# Patient Record
Sex: FEMALE | Race: WHITE | NOT HISPANIC OR LATINO | URBAN - METROPOLITAN AREA
[De-identification: names, ages, dates, MRNs, and addresses within clinical notes are randomized per-mention and may not be internally consistent; named-entity substitution may affect disease eponyms.]

---

## 2018-03-07 ENCOUNTER — INPATIENT (INPATIENT)
Facility: HOSPITAL | Age: 83
LOS: 0 days | Discharge: HOSPICE | End: 2018-03-08
Attending: HOSPITALIST | Admitting: HOSPITALIST
Payer: MEDICARE

## 2018-03-07 VITALS
SYSTOLIC BLOOD PRESSURE: 136 MMHG | HEART RATE: 78 BPM | DIASTOLIC BLOOD PRESSURE: 63 MMHG | TEMPERATURE: 98 F | RESPIRATION RATE: 17 BRPM | OXYGEN SATURATION: 100 %

## 2018-03-07 DIAGNOSIS — Z29.9 ENCOUNTER FOR PROPHYLACTIC MEASURES, UNSPECIFIED: ICD-10-CM

## 2018-03-07 DIAGNOSIS — Z98.41 CATARACT EXTRACTION STATUS, RIGHT EYE: Chronic | ICD-10-CM

## 2018-03-07 DIAGNOSIS — I25.10 ATHEROSCLEROTIC HEART DISEASE OF NATIVE CORONARY ARTERY WITHOUT ANGINA PECTORIS: ICD-10-CM

## 2018-03-07 DIAGNOSIS — Z98.89 OTHER SPECIFIED POSTPROCEDURAL STATES: Chronic | ICD-10-CM

## 2018-03-07 DIAGNOSIS — I10 ESSENTIAL (PRIMARY) HYPERTENSION: ICD-10-CM

## 2018-03-07 DIAGNOSIS — I24.9 ACUTE ISCHEMIC HEART DISEASE, UNSPECIFIED: ICD-10-CM

## 2018-03-07 DIAGNOSIS — S72.90XA UNSPECIFIED FRACTURE OF UNSPECIFIED FEMUR, INITIAL ENCOUNTER FOR CLOSED FRACTURE: Chronic | ICD-10-CM

## 2018-03-07 DIAGNOSIS — R07.9 CHEST PAIN, UNSPECIFIED: ICD-10-CM

## 2018-03-07 LAB
ALBUMIN SERPL ELPH-MCNC: 4.2 G/DL — SIGNIFICANT CHANGE UP (ref 3.3–5)
ALP SERPL-CCNC: 61 U/L — SIGNIFICANT CHANGE UP (ref 40–120)
ALT FLD-CCNC: 8 U/L — SIGNIFICANT CHANGE UP (ref 4–33)
APPEARANCE UR: CLEAR — SIGNIFICANT CHANGE UP
APTT BLD: 30.5 SEC — SIGNIFICANT CHANGE UP (ref 27.5–37.4)
AST SERPL-CCNC: 14 U/L — SIGNIFICANT CHANGE UP (ref 4–32)
BASE EXCESS BLDV CALC-SCNC: 1.5 MMOL/L — SIGNIFICANT CHANGE UP
BASOPHILS # BLD AUTO: 0.03 K/UL — SIGNIFICANT CHANGE UP (ref 0–0.2)
BASOPHILS NFR BLD AUTO: 0.2 % — SIGNIFICANT CHANGE UP (ref 0–2)
BILIRUB SERPL-MCNC: 0.5 MG/DL — SIGNIFICANT CHANGE UP (ref 0.2–1.2)
BILIRUB UR-MCNC: NEGATIVE — SIGNIFICANT CHANGE UP
BLOOD GAS VENOUS - CREATININE: 0.55 MG/DL — SIGNIFICANT CHANGE UP (ref 0.5–1.3)
BLOOD UR QL VISUAL: NEGATIVE — SIGNIFICANT CHANGE UP
BUN SERPL-MCNC: 17 MG/DL — SIGNIFICANT CHANGE UP (ref 7–23)
CALCIUM SERPL-MCNC: 8.7 MG/DL — SIGNIFICANT CHANGE UP (ref 8.4–10.5)
CHLORIDE BLDV-SCNC: 107 MMOL/L — SIGNIFICANT CHANGE UP (ref 96–108)
CHLORIDE SERPL-SCNC: 104 MMOL/L — SIGNIFICANT CHANGE UP (ref 98–107)
CHOLEST SERPL-MCNC: 198 MG/DL — SIGNIFICANT CHANGE UP (ref 120–199)
CK MB BLD-MCNC: 1.05 NG/ML — SIGNIFICANT CHANGE UP (ref 1–4.7)
CK MB BLD-MCNC: 1.81 NG/ML — SIGNIFICANT CHANGE UP (ref 1–4.7)
CK SERPL-CCNC: 14 U/L — LOW (ref 25–170)
CK SERPL-CCNC: 17 U/L — LOW (ref 25–170)
CO2 SERPL-SCNC: 23 MMOL/L — SIGNIFICANT CHANGE UP (ref 22–31)
COLOR SPEC: SIGNIFICANT CHANGE UP
CREAT SERPL-MCNC: 0.59 MG/DL — SIGNIFICANT CHANGE UP (ref 0.5–1.3)
D DIMER BLD IA.RAPID-MCNC: 174 NG/ML — SIGNIFICANT CHANGE UP
EOSINOPHIL # BLD AUTO: 0 K/UL — SIGNIFICANT CHANGE UP (ref 0–0.5)
EOSINOPHIL NFR BLD AUTO: 0 % — SIGNIFICANT CHANGE UP (ref 0–6)
GAS PNL BLDV: 138 MMOL/L — SIGNIFICANT CHANGE UP (ref 136–146)
GLUCOSE BLDV-MCNC: 161 — HIGH (ref 70–99)
GLUCOSE SERPL-MCNC: 154 MG/DL — HIGH (ref 70–99)
GLUCOSE UR-MCNC: NEGATIVE — SIGNIFICANT CHANGE UP
HBA1C BLD-MCNC: 5.8 % — HIGH (ref 4–5.6)
HCO3 BLDV-SCNC: 24 MMOL/L — SIGNIFICANT CHANGE UP (ref 20–27)
HCT VFR BLD CALC: 39.9 % — SIGNIFICANT CHANGE UP (ref 34.5–45)
HCT VFR BLDV CALC: 39.2 % — SIGNIFICANT CHANGE UP (ref 34.5–45)
HDLC SERPL-MCNC: 52 MG/DL — SIGNIFICANT CHANGE UP (ref 45–65)
HGB BLD-MCNC: 12.3 G/DL — SIGNIFICANT CHANGE UP (ref 11.5–15.5)
HGB BLDV-MCNC: 12.7 G/DL — SIGNIFICANT CHANGE UP (ref 11.5–15.5)
IMM GRANULOCYTES # BLD AUTO: 0.04 # — SIGNIFICANT CHANGE UP
IMM GRANULOCYTES NFR BLD AUTO: 0.2 % — SIGNIFICANT CHANGE UP (ref 0–1.5)
INR BLD: 1.04 — SIGNIFICANT CHANGE UP (ref 0.88–1.17)
KETONES UR-MCNC: NEGATIVE — SIGNIFICANT CHANGE UP
LACTATE BLDV-MCNC: 3.1 MMOL/L — HIGH (ref 0.5–2)
LEUKOCYTE ESTERASE UR-ACNC: NEGATIVE — SIGNIFICANT CHANGE UP
LIPID PNL WITH DIRECT LDL SERPL: 153 MG/DL — SIGNIFICANT CHANGE UP
LYMPHOCYTES # BLD AUTO: 11.52 K/UL — HIGH (ref 1–3.3)
LYMPHOCYTES # BLD AUTO: 66.9 % — HIGH (ref 13–44)
MCHC RBC-ENTMCNC: 28.7 PG — SIGNIFICANT CHANGE UP (ref 27–34)
MCHC RBC-ENTMCNC: 30.8 % — LOW (ref 32–36)
MCV RBC AUTO: 93.2 FL — SIGNIFICANT CHANGE UP (ref 80–100)
MONOCYTES # BLD AUTO: 0.3 K/UL — SIGNIFICANT CHANGE UP (ref 0–0.9)
MONOCYTES NFR BLD AUTO: 1.7 % — LOW (ref 2–14)
MUCOUS THREADS # UR AUTO: SIGNIFICANT CHANGE UP
NEUTROPHILS # BLD AUTO: 5.33 K/UL — SIGNIFICANT CHANGE UP (ref 1.8–7.4)
NEUTROPHILS NFR BLD AUTO: 31 % — LOW (ref 43–77)
NITRITE UR-MCNC: NEGATIVE — SIGNIFICANT CHANGE UP
NON-SQ EPI CELLS # UR AUTO: <1 — SIGNIFICANT CHANGE UP
NRBC # FLD: 0 — SIGNIFICANT CHANGE UP
NT-PROBNP SERPL-SCNC: 1050 PG/ML — SIGNIFICANT CHANGE UP
PCO2 BLDV: 44 MMHG — SIGNIFICANT CHANGE UP (ref 41–51)
PH BLDV: 7.39 PH — SIGNIFICANT CHANGE UP (ref 7.32–7.43)
PH UR: 6 — SIGNIFICANT CHANGE UP (ref 4.6–8)
PLATELET # BLD AUTO: 234 K/UL — SIGNIFICANT CHANGE UP (ref 150–400)
PMV BLD: 10.5 FL — SIGNIFICANT CHANGE UP (ref 7–13)
PO2 BLDV: < 24 MMHG — LOW (ref 35–40)
POTASSIUM BLDV-SCNC: 4.8 MMOL/L — HIGH (ref 3.4–4.5)
POTASSIUM SERPL-MCNC: 5.2 MMOL/L — SIGNIFICANT CHANGE UP (ref 3.5–5.3)
POTASSIUM SERPL-SCNC: 5.2 MMOL/L — SIGNIFICANT CHANGE UP (ref 3.5–5.3)
PROT SERPL-MCNC: 6.5 G/DL — SIGNIFICANT CHANGE UP (ref 6–8.3)
PROT UR-MCNC: NEGATIVE MG/DL — SIGNIFICANT CHANGE UP
PROTHROM AB SERPL-ACNC: 11.6 SEC — SIGNIFICANT CHANGE UP (ref 9.8–13.1)
RBC # BLD: 4.28 M/UL — SIGNIFICANT CHANGE UP (ref 3.8–5.2)
RBC # FLD: 13.4 % — SIGNIFICANT CHANGE UP (ref 10.3–14.5)
RBC CASTS # UR COMP ASSIST: SIGNIFICANT CHANGE UP (ref 0–?)
SAO2 % BLDV: 22.9 % — LOW (ref 60–85)
SODIUM SERPL-SCNC: 141 MMOL/L — SIGNIFICANT CHANGE UP (ref 135–145)
SP GR SPEC: 1.01 — SIGNIFICANT CHANGE UP (ref 1–1.04)
SQUAMOUS # UR AUTO: SIGNIFICANT CHANGE UP
TRIGL SERPL-MCNC: 74 MG/DL — SIGNIFICANT CHANGE UP (ref 10–149)
TROPONIN T SERPL-MCNC: < 0.06 NG/ML — SIGNIFICANT CHANGE UP (ref 0–0.06)
TROPONIN T SERPL-MCNC: < 0.06 NG/ML — SIGNIFICANT CHANGE UP (ref 0–0.06)
TSH SERPL-MCNC: 1.9 UIU/ML — SIGNIFICANT CHANGE UP (ref 0.27–4.2)
UROBILINOGEN FLD QL: NORMAL MG/DL — SIGNIFICANT CHANGE UP
WBC # BLD: 17.22 K/UL — HIGH (ref 3.8–10.5)
WBC # FLD AUTO: 17.22 K/UL — HIGH (ref 3.8–10.5)
WBC UR QL: SIGNIFICANT CHANGE UP (ref 0–?)

## 2018-03-07 PROCEDURE — 99223 1ST HOSP IP/OBS HIGH 75: CPT | Mod: GV

## 2018-03-07 PROCEDURE — 71045 X-RAY EXAM CHEST 1 VIEW: CPT | Mod: 26

## 2018-03-07 PROCEDURE — 93970 EXTREMITY STUDY: CPT | Mod: 26

## 2018-03-07 RX ORDER — PREGABALIN 225 MG/1
1000 CAPSULE ORAL ONCE
Qty: 0 | Refills: 0 | Status: COMPLETED | OUTPATIENT
Start: 2018-03-07 | End: 2018-03-07

## 2018-03-07 RX ORDER — CLOPIDOGREL BISULFATE 75 MG/1
75 TABLET, FILM COATED ORAL DAILY
Qty: 0 | Refills: 0 | Status: DISCONTINUED | OUTPATIENT
Start: 2018-03-07 | End: 2018-03-08

## 2018-03-07 RX ORDER — ACETAMINOPHEN 500 MG
1000 TABLET ORAL ONCE
Qty: 0 | Refills: 0 | Status: COMPLETED | OUTPATIENT
Start: 2018-03-07 | End: 2018-03-07

## 2018-03-07 RX ORDER — SIMVASTATIN 20 MG/1
20 TABLET, FILM COATED ORAL AT BEDTIME
Qty: 0 | Refills: 0 | Status: DISCONTINUED | OUTPATIENT
Start: 2018-03-07 | End: 2018-03-08

## 2018-03-07 RX ORDER — METOPROLOL TARTRATE 50 MG
100 TABLET ORAL DAILY
Qty: 0 | Refills: 0 | Status: DISCONTINUED | OUTPATIENT
Start: 2018-03-07 | End: 2018-03-08

## 2018-03-07 RX ORDER — HEPARIN SODIUM 5000 [USP'U]/ML
5000 INJECTION INTRAVENOUS; SUBCUTANEOUS EVERY 8 HOURS
Qty: 0 | Refills: 0 | Status: DISCONTINUED | OUTPATIENT
Start: 2018-03-07 | End: 2018-03-08

## 2018-03-07 RX ORDER — PREGABALIN 225 MG/1
1 CAPSULE ORAL
Qty: 0 | Refills: 0 | COMMUNITY

## 2018-03-07 RX ORDER — ISOSORBIDE MONONITRATE 60 MG/1
60 TABLET, EXTENDED RELEASE ORAL DAILY
Qty: 0 | Refills: 0 | Status: DISCONTINUED | OUTPATIENT
Start: 2018-03-07 | End: 2018-03-08

## 2018-03-07 RX ORDER — AMLODIPINE BESYLATE 2.5 MG/1
5 TABLET ORAL DAILY
Qty: 0 | Refills: 0 | Status: DISCONTINUED | OUTPATIENT
Start: 2018-03-07 | End: 2018-03-08

## 2018-03-07 RX ADMIN — HEPARIN SODIUM 5000 UNIT(S): 5000 INJECTION INTRAVENOUS; SUBCUTANEOUS at 13:34

## 2018-03-07 RX ADMIN — HEPARIN SODIUM 5000 UNIT(S): 5000 INJECTION INTRAVENOUS; SUBCUTANEOUS at 21:46

## 2018-03-07 RX ADMIN — Medication 400 MILLIGRAM(S): at 06:29

## 2018-03-07 RX ADMIN — CLOPIDOGREL BISULFATE 75 MILLIGRAM(S): 75 TABLET, FILM COATED ORAL at 13:33

## 2018-03-07 RX ADMIN — SIMVASTATIN 20 MILLIGRAM(S): 20 TABLET, FILM COATED ORAL at 21:46

## 2018-03-07 RX ADMIN — PREGABALIN 1000 MICROGRAM(S): 225 CAPSULE ORAL at 13:33

## 2018-03-07 RX ADMIN — ISOSORBIDE MONONITRATE 60 MILLIGRAM(S): 60 TABLET, EXTENDED RELEASE ORAL at 13:33

## 2018-03-07 NOTE — ED ADULT NURSE NOTE - OBJECTIVE STATEMENT
pt received to room 4, aOx3 . pt received to room 4, aOx3 . pt brought in by EMS and received 2 SL Nitro in field. pt has a hx of CAD. pt stated that her pain is a strong pressure on left side of sternal boarder. pt states that her pain is a 7/10 but denies pain medications at this time. pt received EKG upon arrival. labs were collected and sent from right 22g in AC. pt respirations are even and unlabored now on room air. pt is blind , nephew at bedside. pt on cardiac monitor and will continue to monitor

## 2018-03-07 NOTE — CONSULT NOTE ADULT - SUBJECTIVE AND OBJECTIVE BOX
Patient seen and evaluated at bedside   Chief Complaint: Chest pain    HPI:  89 yo F p/w constant Left sided CP, described as sharp and burning, radiating down her Lt arm since yesterday evening. Pt initially didn't feel good during the day, felt nausea, then ~9pm while sitting and listening to the TV she began to feel the chest discomfort, she itzel to the bathroom, moved her bowels and while walking back to her  she felt the CP come on stronger 10/10 severity. Pt admits to a slight reproducible & pleuritic component, unsure if positional. Pt also felt accompanying dizziness & SOB. No diaphoresis or palp's. Pt told her sister about the CP who in turn called her daughter & grandson who then called EMS. Pt is unsure if this is what she felt when she had stents, doesn't remember. Pt received NTG but denies improvement of CP with this, felt CP eased up last night but still persists. Also c/o VERDUZCO going up a flight of stairs for a few years, as well as b/l pedal edema for a few years. (07 Mar 2018 10:40)    Currently patient is without chest pain.    PMH:   Legally blind  Complete heart block  Hyperlipemia  Hypertension  Pulmonary embolism  Angina pectoris, unstable  Cancer of Uterus  Knee Problem  Osteoarthritis  Cataract  Osteoporosis  Glaucoma, Narrow-Angle  Dyslipidemia  CLL (Chronic Lymphoblastic Yosef  Bradyarrhythmia  Coronary Artery Disease  HTN    PSH:   Closed fracture of femur  H/O bilateral cataract extraction  History of total abdominal hysterectomy  History of tonsillectomy  Retina  baerveldt implant left eye  History of Carotid Endarterectomy  Unspecified Cataract  Coronary Stent  History of Arthroscopic Knee S  History of Hysterectomy  Cardiac Pacemaker  Dystrophy, Cornea  History of Tonsillectomy  Enlargement of Tonsils    Medications:   amLODIPine   Tablet 5 milliGRAM(s) Oral daily  clopidogrel Tablet 75 milliGRAM(s) Oral daily  heparin  Injectable 5000 Unit(s) SubCutaneous every 8 hours  isosorbide   mononitrate ER Tablet (IMDUR) 60 milliGRAM(s) Oral daily  metoprolol succinate  milliGRAM(s) Oral daily  simvastatin 20 milliGRAM(s) Oral at bedtime    Allergies:  morphine (Other)  oxycodone (Unknown)  Percocet 5/325 (Unknown)  pilocarpine (Other)    FAMILY HISTORY:  No pertinent family history in first degree relatives    Social History:  Smoking:  Denies    REVIEW OF SYSTEMS:  CARDIOVASCULAR: See HPI  All other review of systems is negative unless indicated above    Physical Exam:  T(C): 36.9 (18 @ 13:55), Max: 36.9 (18 @ 13:55)  HR: 69 (18 @ 13:55) (69 - 81)  BP: 138/53 (18 @ 13:55) (122/62 - 153/66)  RR: 18 (18 @ 13:55) (16 - 18)  SpO2: 97% (18 @ 13:55) (94% - 100%)  Wt(kg): --    Daily Height in cm: 147.32 (07 Mar 2018 10:40)    Daily     Appearance:  Normal, NAD  Eyes:  PERRL, EOMI  HEENT: Normal oral muscosa, NC/AT.    Neck:  No JVD.  Carotids are full  Respiratory: Clear to auscultation bilaterally  Cardiovascular: Normal S1 and S2 with 1/6 systolic murmur base and 2/6 HSM murmur LSB radiating to axila.  No rubs or gallops  Abdomen:   BS normal, Soft,  Non-tender without organomegaly or masses  Extremities: Trace BL leg edema      Labs:                        12.3   17.22 )-----------( 234      ( 07 Mar 2018 01:42 )             39.9     03-    141  |  104  |  17  ----------------------------<  154<H>  5.2   |  23  |  0.59    Ca    8.7      07 Mar 2018 01:42    TPro  6.5  /  Alb  4.2  /  TBili  0.5  /  DBili  x   /  AST  14  /  ALT  8   /  AlkPhos  61  03-07    PT/INR - ( 07 Mar 2018 01:42 )   PT: 11.6 SEC;   INR: 1.04          PTT - ( 07 Mar 2018 01:42 )  PTT:30.5 SEC  CARDIAC MARKERS ( 07 Mar 2018 10:31 )  x     / < 0.06 ng/mL / 17 u/L / 1.81 ng/mL / x      CARDIAC MARKERS ( 07 Mar 2018 01:42 )  x     / < 0.06 ng/mL / 14 u/L / 1.05 ng/mL / x          Serum Pro-Brain Natriuretic Peptide: 1050 pg/mL ( @ 01:42)    Total Cholesterol: 198  LDL: 153  HDL: 52  T    Hemoglobin A1C, Whole Blood: 5.8 % ( @ 10:31)    Thyroid Stimulating Hormone, Serum: 1.90 uIU/mL ( @ 10:31)        ECG:  AV paced rhythm LBBB, L axis.  Occasional PVCs.

## 2018-03-07 NOTE — ED ADULT NURSE NOTE - CHIEF COMPLAINT QUOTE
pt. BIBA for sudden onset of left sided chest pain radiating to left arm x 2hrs. pt. was getting ready for bed, felt pain on chest, went to the bathroom and had a BM then pain got worse. pt. also reports feeling nauseated and had SOB. pt. took 2 NTG at home and was given 1 NTG SL by EMS PTA, with minimal relief. PMHx: PPM, cardiac stentsx2, HTN, CLL, blindness

## 2018-03-07 NOTE — H&P ADULT - NSHPSOCIALHISTORY_GEN_ALL_CORE
, lives with sister, niece. Denies smoking, or drug use. Drinks a little wine or a cocktail on occasion but not lately

## 2018-03-07 NOTE — H&P ADULT - PROBLEM SELECTOR PLAN 4
IMPROVE VTE Individual Risk Assessment        RISK                                                          Points  [  ] Previous VTE                                                 3  [  ] Thrombophilia                                              2  [  ] Lower limb paralysis                                    2        (unable to hold up >15 seconds)    [  ] Current Cancer                                             2         (within 6 months)  [x ] Immobilization > 24 hrs                              1  [  ] ICU/CCU stay > 24 hours                            1  [x ] Age > 60                                                        1  IMPROVE VTE Score _________2; hsq for dvt ppx

## 2018-03-07 NOTE — H&P ADULT - PROBLEM SELECTOR PLAN 2
continue Plavix, isosorbide, metoprolol,  statin Concern for angina as stated above.     continue Plavix, isosorbide, metoprolol,  statin

## 2018-03-07 NOTE — ED PROVIDER NOTE - MEDICAL DECISION MAKING DETAILS
89 y/o F w/ PMHx of HTN, CAD w/ [id number], pace maker, CLL not on active tx and blindness, here w/ CP, nausea, SOB, and not feeling well. Plan - Will obtain labs, Cardiac enzymes, BMP, EKG, CXR, admit, pt's cardiologist is Adams Walters.

## 2018-03-07 NOTE — H&P ADULT - ASSESSMENT
89 yo F p/w CP since ~9pm yesterday evening  EKG- V-paced @ 87 89 yo F p/w CP since ~9pm yesterday evening    EKG- V-paced @ 87

## 2018-03-07 NOTE — H&P ADULT - OPH GEN HX ROS MEA POS PC
loss of vision R/blind both eyes/loss of vision L blind both eyes chronic/loss of vision R/loss of vision L

## 2018-03-07 NOTE — H&P ADULT - NEGATIVE ENMT SYMPTOMS
no vertigo/no hearing difficulty/no ear pain/no tinnitus/no sinus symptoms/no nasal discharge/no throat pain/no dysphagia/no nasal congestion

## 2018-03-07 NOTE — H&P ADULT - PROBLEM SELECTOR PLAN 1
Admit to Telemetry, serial CE's, EKG's, FLP, continue plavix, BB, statin. Check Echo. F/U MD note, Cardiology c/s Dr Stokes Admit to Telemetry, serial CE's, EKG's, FLP, Ddimer. continue plavix, BB, statin. Check Echo. F/U MD note, Cardiology c/s Dr Stokes Patient presents with severe chest pain which is different from previous episodes of chest pain. It was left-sided and occurred at rest. Likely atypical but still concerning for angina. Cardiac enzymes wnl x2, inconsistent with acute MI. Followed by Dr. Stokes as outpatient. D-dimer wnl, less likely PE.  -f/u cardiology recommendations  -monitor on cardiac telemetry   -continue plavix, BB, statin   -f/u echo

## 2018-03-07 NOTE — ED ADULT NURSE NOTE - PSH
baerveldt implant left eye    Cardiac Pacemaker  2009  Closed fracture of femur  h/o ORIF right femur fracture, 2012  Coronary Stent  2006 x 3 LAD, Circ, RCA  2012 GRETA placed  Dystrophy, Cornea  right cornea implant  H/O bilateral cataract extraction    History of Arthroscopic Knee S  right knee x 2  History of Carotid Endarterectomy  on L 2009  History of tonsillectomy  as a child  History of total abdominal hysterectomy  MAXX-BSO  Retina  retina and glaucoma surgery left eye -here 12/11, 2/12 and 3/12

## 2018-03-07 NOTE — ED PROVIDER NOTE - PROGRESS NOTE DETAILS
le lea: spoke to dr. espinoza- pt to be admitted to hospitalist and he will consult. accepted by Dr. Hernández text paged tele PA. pt stable

## 2018-03-07 NOTE — ED ADULT NURSE REASSESSMENT NOTE - NS ED NURSE REASSESS COMMENT FT1
pt transported to room 421, pt left in NAD, pt belongs returned, pt medicated as per MD order before transferred.
Pt assisted to bedpan by female PCA at pt request.  Urine samples collected and sent to lab.  PCA changed pt.

## 2018-03-07 NOTE — H&P ADULT - HISTORY OF PRESENT ILLNESS
89 yo F p/w constant Left sided CP, described as sharp and burning, radiating down her Lt arm since yesterday evening. Pt initially didn't feel good during the day, felt nausea, then ~9pm while sitting and listening to the TV she began to feel the chest discomfort, she itzel to the bathroom, moved her bowels and while walking back to her  she felt the CP come on stronger 10/10 severity. Pt admits to a slight reproducible & pleuritic component, unsure if positional. Pt also felt accompanying dizziness & SOB. No diaphoresis or palp's. Pt told her sister about the CP who in turn called her daughter & grandson who is a Pharmacist/ Medical  and then they called EMS. Pt is unsure if this is what she felt when she had stents, doesn't remember. Pt received NTG but denies improvement of CP with this, felt CP eased up last night but still persists. Also c/o VERDUZCO going up a flight of stairs for a few years, as well as b/l pedal edema for a few years.

## 2018-03-07 NOTE — ED ADULT NURSE NOTE - PMH
Cancer of Uterus    Cataract    CLL (Chronic Lymphoblastic Yosef    Complete heart block    Coronary Artery Disease    Dyslipidemia    Glaucoma, Narrow-Angle    HTN    Hyperlipemia    Legally blind    Osteoarthritis    Osteoporosis    Pulmonary embolism

## 2018-03-07 NOTE — H&P ADULT - NSHPPHYSICALEXAM_GEN_ALL_CORE
Vital Signs Last 24 Hrs  T(C): 36.2 (07 Mar 2018 07:10), Max: 36.7 (07 Mar 2018 01:35)  T(F): 97.2 (07 Mar 2018 07:10), Max: 98 (07 Mar 2018 01:35)  HR: 81 (07 Mar 2018 07:10) (72 - 81)  BP: 153/66 (07 Mar 2018 07:10) (122/62 - 153/66)  BP(mean): --  RR: 18 (07 Mar 2018 07:10) (16 - 18)  SpO2: 94% (07 Mar 2018 07:10) (94% - 100%)

## 2018-03-07 NOTE — ED PROVIDER NOTE - OBJECTIVE STATEMENT
89 yo F w/ PMHx of HTN, CAD w/ [id number], pace maker, CLL not on active tx, and blind coming in w/ intermittent lt sided sharp CP throughout the day pt states "as not feeling well today. Felt tired and nauseous. Felt like she had to use bathroom but too tired to make it too restroom. Also felt SOB. Pt took x3 Nitrostat w/ some relief. Pt's family called 911, was given an additional Nitrostat by EMS. Pt states no current pain. Denies any recent illness. No fever, no chills, no cough, no vomiting, no abdominal pain, no urinary sx. 89 yo F w/ PMHx of HTN, CAD w/ [id number], pace maker, CLL not on active tx, and blind coming in w/ intermittent lt sided sharp CP throughout the day pt states "as not feeling well today. Felt tired and nauseous. Felt like she had to use bathroom but too tired to make it too restroom. Also felt SOB. Pt took x3 Nitrostat w/ some relief. Pt's family called 911, was given an additional Nitrostat by EMS. Pt states no current pain. Denies any recent illness. No fever, no chills, no cough, no vomiting, no abdominal pain, no urinary sx.  [place]: Pt lives w/ family, ambulates w/ assistance. Cards Dr. espinoza. Last cath May 2016, pt unsure if any cardiac w/u since then. PT still having the cp but much improved from prior. During episode, she felt like she was going to die. +chronic b/l ankle swelling. Pt states she was given asa.

## 2018-03-07 NOTE — ED ADULT TRIAGE NOTE - CHIEF COMPLAINT QUOTE
pt. BIBA for sudden onset of left sided chest pain radiating to left arm. pt. was getting ready for bed, felt pain on chest, went to the bathroom and had a BM then pain got worse. pt. also reports feeling nauseated and had SOB. pt. took 2 NTG at home and was given 1 NTG SL by EMS, with minimal relief. PMHx: PPM, cardiac stentsx2, HTN, CLL, blindness pt. BIBA for sudden onset of left sided chest pain radiating to left arm x 2hrs. pt. was getting ready for bed, felt pain on chest, went to the bathroom and had a BM then pain got worse. pt. also reports feeling nauseated and had SOB. pt. took 2 NTG at home and was given 1 NTG SL by EMS PTA, with minimal relief. PMHx: PPM, cardiac stentsx2, HTN, CLL, blindness

## 2018-03-07 NOTE — CONSULT NOTE ADULT - ASSESSMENT
Impression  Possible unstable angina versus other etiology of worsening frequent chest discomfort.  PAtient has worsening chest pain syndrome 2 years ago with cath revealing only non-obstructive CAD.                      Known moderate mitral regurgitation,  LVH and mild diastolic dysfunction.                       No enzymatic evidence of acute MI.  D-dimer normal and pro-BNP normal for age.    Recommend:  Would observe on tele 1 day.                                             Echocardiogram.                         Would not pursue stress testing or angiography at this time.

## 2018-03-07 NOTE — ED PROVIDER NOTE - ATTENDING CONTRIBUTION TO CARE
89F PMH HTN, CAD w/ stents, PPM, CLL not on active tx, blind, p/w worsening of chronic intermittent CP. Associated w/ nausea, SOB, and feeling like she was going to die. Received nitro and asa. Still in pain but much improved. No neuro or infectious symptoms. Vitals wnl, exam as above. Well appearing. EKG non-ischemic.  ddx: Concern for ACS. clinically not PE, tamponade, dissection, PTX, perf, myocarditis, mediastinitis.   CBC, cmp, Ce, bnp. CXR. High risk cardiac pt w/ concerning story. Likely admission for further cardiac w/u.

## 2018-03-08 ENCOUNTER — TRANSCRIPTION ENCOUNTER (OUTPATIENT)
Age: 83
End: 2018-03-08

## 2018-03-08 VITALS — WEIGHT: 114.64 LBS

## 2018-03-08 DIAGNOSIS — R07.89 OTHER CHEST PAIN: ICD-10-CM

## 2018-03-08 DIAGNOSIS — C91.10 CHRONIC LYMPHOCYTIC LEUKEMIA OF B-CELL TYPE NOT HAVING ACHIEVED REMISSION: ICD-10-CM

## 2018-03-08 LAB
BUN SERPL-MCNC: 18 MG/DL — SIGNIFICANT CHANGE UP (ref 7–23)
CALCIUM SERPL-MCNC: 8.4 MG/DL — SIGNIFICANT CHANGE UP (ref 8.4–10.5)
CHLORIDE SERPL-SCNC: 102 MMOL/L — SIGNIFICANT CHANGE UP (ref 98–107)
CO2 SERPL-SCNC: 22 MMOL/L — SIGNIFICANT CHANGE UP (ref 22–31)
CREAT SERPL-MCNC: 0.6 MG/DL — SIGNIFICANT CHANGE UP (ref 0.5–1.3)
GLUCOSE SERPL-MCNC: 73 MG/DL — SIGNIFICANT CHANGE UP (ref 70–99)
HCT VFR BLD CALC: 39.5 % — SIGNIFICANT CHANGE UP (ref 34.5–45)
HGB BLD-MCNC: 12.9 G/DL — SIGNIFICANT CHANGE UP (ref 11.5–15.5)
MAGNESIUM SERPL-MCNC: 2 MG/DL — SIGNIFICANT CHANGE UP (ref 1.6–2.6)
MCHC RBC-ENTMCNC: 31 PG — SIGNIFICANT CHANGE UP (ref 27–34)
MCHC RBC-ENTMCNC: 32.7 % — SIGNIFICANT CHANGE UP (ref 32–36)
MCV RBC AUTO: 95 FL — SIGNIFICANT CHANGE UP (ref 80–100)
NRBC # FLD: 0 — SIGNIFICANT CHANGE UP
PLATELET # BLD AUTO: 203 K/UL — SIGNIFICANT CHANGE UP (ref 150–400)
PMV BLD: 11.1 FL — SIGNIFICANT CHANGE UP (ref 7–13)
POTASSIUM SERPL-MCNC: 4.8 MMOL/L — SIGNIFICANT CHANGE UP (ref 3.5–5.3)
POTASSIUM SERPL-SCNC: 4.8 MMOL/L — SIGNIFICANT CHANGE UP (ref 3.5–5.3)
RBC # BLD: 4.16 M/UL — SIGNIFICANT CHANGE UP (ref 3.8–5.2)
RBC # FLD: 13.4 % — SIGNIFICANT CHANGE UP (ref 10.3–14.5)
SODIUM SERPL-SCNC: 141 MMOL/L — SIGNIFICANT CHANGE UP (ref 135–145)
SPECIMEN SOURCE: SIGNIFICANT CHANGE UP
WBC # BLD: 18.62 K/UL — HIGH (ref 3.8–10.5)
WBC # FLD AUTO: 18.62 K/UL — HIGH (ref 3.8–10.5)

## 2018-03-08 PROCEDURE — 99239 HOSP IP/OBS DSCHRG MGMT >30: CPT

## 2018-03-08 RX ADMIN — ISOSORBIDE MONONITRATE 60 MILLIGRAM(S): 60 TABLET, EXTENDED RELEASE ORAL at 11:30

## 2018-03-08 RX ADMIN — CLOPIDOGREL BISULFATE 75 MILLIGRAM(S): 75 TABLET, FILM COATED ORAL at 11:30

## 2018-03-08 RX ADMIN — AMLODIPINE BESYLATE 5 MILLIGRAM(S): 2.5 TABLET ORAL at 05:02

## 2018-03-08 RX ADMIN — HEPARIN SODIUM 5000 UNIT(S): 5000 INJECTION INTRAVENOUS; SUBCUTANEOUS at 05:02

## 2018-03-08 RX ADMIN — Medication 100 MILLIGRAM(S): at 05:02

## 2018-03-08 NOTE — DISCHARGE NOTE ADULT - CARE PLAN
Principal Discharge DX:	Chest pain  Goal:	prevent further events  Assessment and plan of treatment:	follow up with your PMD in one week - call for appointment  Secondary Diagnosis:	Coronary Artery Disease

## 2018-03-08 NOTE — PROGRESS NOTE ADULT - PROBLEM SELECTOR PLAN 4
Has chronic leukocytosis from this, no signs or symptoms of infection  follows with outpt Heme/Onc once a month  Currently not receiving treatment

## 2018-03-08 NOTE — DISCHARGE NOTE ADULT - PATIENT PORTAL LINK FT
You can access [facility], offered by [facility], by registering with the following website: [email or web address]

## 2018-03-08 NOTE — DISCHARGE NOTE ADULT - HOSPITAL COURSE
89 y/o female with a PMHx of CAD S/P stent placement, CHB S/P PPM placement, carotid stenosis S/P CEA, HTN, HLD, CLL, legally blind, presents to ED with chest pain and shortness of breath.     + Chest pain- cards following (Dr. Stokes)  EKG: V paced at 87 bpm  CE x2: Negative  CXR: Clear lungs. No pleural effusions or pneumothorax. Stable left chest wall dual-lead pacemaker and cardiac and mediastinal silhouettes including aortic and mitral annular calcifications. Trachea midline. Generalized osteopenia spinal degenerative changes with broad dextrocurvature, and bilateral shoulder degenerative disease again noted.  WBC: 17.22  Glucose: 154  ProBNP: 1050  UA: Negative  D-dimer: 174    3/7 LE dopplers: No evidence of deep venous thrombosis in the visualized bilateral lower   extremities.    As per Cardiology - no further work up needed    Discussed with MD - pt stable for discharge home with home hospice,  discharge medications reviewed with MD. 89 y/o female with a PMHx of CAD S/P stent placement, CHB S/P PPM placement, carotid stenosis S/P CEA, HTN, HLD, CLL, legally blind, presents to ED with chest pain and shortness of breath. Chest pain self resolved.    + Chest pain- cards following (Dr. Stokes)  EKG: V paced at 87 bpm  CE x2: Negative  CXR: Clear lungs. No pleural effusions or pneumothorax. Stable left chest wall dual-lead pacemaker and cardiac and mediastinal silhouettes including aortic and mitral annular calcifications. Trachea midline. Generalized osteopenia spinal degenerative changes with broad dextrocurvature, and bilateral shoulder degenerative disease again noted.  WBC: 17.22  Glucose: 154  ProBNP: 1050  UA: Negative  D-dimer: 174    3/7 LE dopplers: No evidence of deep venous thrombosis in the visualized bilateral lower   extremities.    As per Cardiology - no further work up needed    Discussed with MD - pt stable for discharge home with home hospice,  discharge medications reviewed with MD.

## 2018-03-08 NOTE — DISCHARGE NOTE ADULT - CARE PROVIDER_API CALL
Adams Stokes), Cardiovascular Disease; Internal Medicine  [address] [place] [address]  Phone: [phone]  Fax: [phone]

## 2018-03-08 NOTE — PROGRESS NOTE ADULT - PROBLEM SELECTOR PLAN 1
self resolved  trops negative x2, no events on tele  No further inpatient workup needed per cardiology  continue home meds for nonobstructive CAD

## 2018-03-08 NOTE — DISCHARGE NOTE ADULT - FUNCTIONAL SCREEN CURRENT LEVEL: DRESSING, MLM
"              After Visit Summary   4/15/2017    Ida Gayle    MRN: [id number]           Patient Information     Date Of Birth          1972        Visit Information        Provider Department      4/15/2017 10:45 AM Raffaele Cisneros MD [facility]        Today's Diagnoses     Exposure to strep throat    -  1    MS (multiple sclerosis) (H)- Dr Bolanos           Follow-ups after your visit        Who to contact     If you have questions or need follow up information about today's clinic visit or your schedule please contact [facility] directly at [phone].  Normal or non-critical lab and imaging results will be communicated to you by Viewdlehart, letter or phone within 4 business days after the clinic has received the results. If you do not hear from us within 7 days, please contact the clinic through Viewdlehart or phone. If you have a critical or abnormal lab result, we will notify you by phone as soon as possible.  Submit refill requests through Stratopy or call your pharmacy and they will forward the refill request to us. Please allow 3 business days for your refill to be completed.          Additional Information About Your Visit        MyChart Information     Stratopy lets you send messages to your doctor, view your test results, renew your prescriptions, schedule appointments and more. To sign up, go to [email or web address] . Click on \"Log in\" on the left side of the screen, which will take you to the Welcome page. Then click on \"Sign up Now\" on the right side of the page.     You will be asked to enter the access code listed below, as well as some personal information. Please follow the directions to create your username and password.     Your access code is: [email or web address]  Expires: 2017 11:15 AM     Your access code will  in 90 days. If you need help or a new code, please call your [place] clinic or [phone].        Care " EveryWhere ID     This is your Care EveryWhere ID. This could be used by other organizations to access your [place] medical records  [id number]        Your Vitals Were     Pulse Temperature Respirations Last Period Pulse Oximetry       76 98.2  F (36.8  C) (Oral) 16 04/15/2017 99%        Blood Pressure from Last 3 Encounters:   04/15/17 120/80   06/22/16 110/70   06/07/16 110/70    Weight from Last 3 Encounters:   06/22/16 124.3 kg (274 lb)   06/07/16 116.6 kg (257 lb)   08/21/14 105.2 kg (232 lb)              We Performed the Following     RAPID STREP (BFP)        Primary Care Provider Office Phone # Fax #    Natasha Cervantes [phone]       [facility] [address] [facility]  [address]  [facility] [address]        Thank you!     Thank you for choosing [facility]  for your care. Our goal is always to provide you with excellent care. Hearing back from our patients is one way we can continue to improve our services. Please take a few minutes to complete the written survey that you may receive in the mail after your visit with us. Thank you!             Your Updated Medication List - Protect others around you: Learn how to safely use, store and throw away your medicines at www.disposemymeds.org.          This list is accurate as of: 4/15/17 11:15 AM.  Always use your most recent med list.                   Brand Name Dispense Instructions for use    citalopram 20 MG tablet    celeXA         hydrocortisone 0.2 % cream    WESTCORT    45 g    Apply sparingly to affected area at bedtime       REBIF SC             (0) independent

## 2018-03-08 NOTE — PROGRESS NOTE ADULT - SUBJECTIVE AND OBJECTIVE BOX
No further c/o chest pain.  Walked to bathroom without dyspnea.        REVIEW OF SYSTEMS:  CARDIOVASCULAR: No chest pain, dyspnea or palpitations  All other review of systems is negative unless indicated above    Medications:  amLODIPine   Tablet 5 milliGRAM(s) Oral daily  clopidogrel Tablet 75 milliGRAM(s) Oral daily  heparin  Injectable 5000 Unit(s) SubCutaneous every 8 hours  isosorbide   mononitrate ER Tablet (IMDUR) 60 milliGRAM(s) Oral daily  metoprolol succinate  milliGRAM(s) Oral daily  simvastatin 20 milliGRAM(s) Oral at bedtime      Physical Exam:  Vitals:  T(C): 36.5 (18 @ 05:01), Max: 36.9 (18 @ 13:55)  HR: 73 (18 @ 05:01) (69 - 75)  BP: 134/52 (18 @ 05:01) (117/46 - 138/53)  BP(mean): --  RR: 18 (18 @ 05:01) (18 - 18)  SpO2: 97% (18 @ 05:01) (95% - 97%)  Wt(kg): --  Daily Height in cm: 147.32 (07 Mar 2018 10:40)    Daily Weight in k (08 Mar 2018 07:19)  I&O's Summary      Appearance:  Normal, NAD  Eyes:  PERRL, EOMI  HEENT: Normal oral mus[place]a, NC/AT.    Neck:  No JVD.  Carotids are full  Respiratory: Clear to auscultation bilaterally  Cardiovascular: Normal S1 and S2 with 1/6 systolic murmur base and 2/6 HSM murmur LSB radiating to axilla  No rubs or gallops  Abdomen:   BS normal, Soft,  Non-tender without organomegaly or masses  Extremities: Trace BL leg edema       labs pending                              12.3   17.22 )-----------( 234      ( 07 Mar 2018 01:42 )             39.9       141  |  104  |  17  ----------------------------<  154<H>  5.2   |  23  |  0.59    Ca    8.7      07 Mar 2018 01:42    TPro  6.5  /  Alb  4.2  /  TBili  0.5  /  DBili  x   /  AST  14  /  ALT  8   /  AlkPhos  61  03-    PT/INR - ( 07 Mar 2018 01:42 )   PT: 11.6 SEC;   INR: 1.04          PTT - ( 07 Mar 2018 01:42 )  PTT:30.5 SEC  CARDIAC MARKERS ( 07 Mar 2018 10:31 )  x     / < 0.06 ng/mL / 17 u/L / 1.81 ng/mL / x      CARDIAC MARKERS ( 07 Mar 2018 01:42 )  x     / < 0.06 ng/mL / 14 u/L / 1.05 ng/mL / x          Serum Pro-Brain Natriuretic Peptide: 1050 pg/mL ( @ 01:42)    Total Cholesterol: 198  LDL: 153  HDL: 52  T    Hemoglobin A1C, Whole Blood: 5.8 % ( @ 10:31)        Interpretation of Telemetry:  Pace 60-80.  Single PVCs
Patient is a 89y old  Female who presents with a chief complaint of chest pain (08 Mar 2018 10:20)      SUBJECTIVE / OVERNIGHT EVENTS: No acute events overnight. No events on tele. No further chest pain or SOB. No N/V/D.    MEDICATIONS  (STANDING):  amLODIPine   Tablet 5 milliGRAM(s) Oral daily  clopidogrel Tablet 75 milliGRAM(s) Oral daily  heparin  Injectable 5000 Unit(s) SubCutaneous every 8 hours  isosorbide   mononitrate ER Tablet (IMDUR) 60 milliGRAM(s) Oral daily  metoprolol succinate  milliGRAM(s) Oral daily  simvastatin 20 milliGRAM(s) Oral at bedtime    MEDICATIONS  (PRN):      T(C): 36.5 (18 @ 05:01), Max: 36.9 (18 @ 13:55)  HR: 73 (18 @ 05:01) (69 - 75)  BP: 134/52 (18 @ 05:01) (117/46 - 138/53)  RR: 18 (18 @ 05:01) (18 - 18)  SpO2: 97% (18 @ 05:01) (95% - 97%)  CAPILLARY BLOOD GLUCOSE        I&O's Summary      PHYSICAL EXAM:  GENERAL: no apparent distress, on room air  EYES: closed, blind  CHEST/LUNG: Clear to auscultation bilaterally; No wheezing or crackles  HEART: s1/s2, holosystolic murmur  ABDOMEN: Soft, Nontender, Nondistended; Bowel sounds present  EXTREMITIES:  No clubbing, cyanosis, or edema  NEUROLOGY: awake, alert, responds to Qs appropriately, no gross focal deficits    LABS:                        12.9   18.62 )-----------( 203      ( 08 Mar 2018 05:29 )             39.5     -08    141  |  102  |  18  ----------------------------<  73  4.8   |  22  |  0.60    Ca    8.4      08 Mar 2018 05:41  Mg     2.0     -    TPro  6.5  /  Alb  4.2  /  TBili  0.5  /  DBili  x   /  AST  14  /  ALT  8   /  AlkPhos  61  -    PT/INR - ( 07 Mar 2018 01:42 )   PT: 11.6 SEC;   INR: 1.04          PTT - ( 07 Mar 2018 01:42 )  PTT:30.5 SEC  CARDIAC MARKERS ( 07 Mar 2018 10:31 )  x     / < 0.06 ng/mL / 17 u/L / 1.81 ng/mL / x      CARDIAC MARKERS ( 07 Mar 2018 01:42 )  x     / < 0.06 ng/mL / 14 u/L / 1.05 ng/mL / x          Urinalysis Basic - ( 07 Mar 2018 04:10 )    Color: PLYEL / Appearance: CLEAR / S.011 / pH: 6.0  Gluc: NEGATIVE / Ketone: NEGATIVE  / Bili: NEGATIVE / Urobili: NORMAL mg/dL   Blood: NEGATIVE / Protein: NEGATIVE mg/dL / Nitrite: NEGATIVE   Leuk Esterase: NEGATIVE / RBC: 0-2 / WBC 0-2   Sq Epi: OCC / Non Sq Epi: x / Bacteria: x        RADIOLOGY & ADDITIONAL TESTS:

## 2018-03-08 NOTE — DISCHARGE NOTE ADULT - COMMUNITY RESOURCES
Referred back to [facility] [phone]. Hospice will be in contact with family to evaluate and visit patient at home.

## 2018-03-08 NOTE — PROGRESS NOTE ADULT - ASSESSMENT
Impression:  No evidence of infarct.  Currently chest pain free.    Recommedn:  No objection to D/C home today.  If echo is not done prior to transportation home, would send home without having it performed.
89 yo F PMH CLL p/w CP

## 2018-03-08 NOTE — DISCHARGE NOTE ADULT - MEDICATION SUMMARY - MEDICATIONS TO TAKE
I will START or STAY ON the medications listed below when I get home from the hospital:    isosorbide mononitrate 60 mg oral tablet, extended release  -- 1 tab(s) by mouth once a day  -- Indication: For CAD    Zocor 20 mg oral tablet  -- 1 tab(s) by mouth once a day (at bedtime)  -- Indication: For Hyperlipidemia    Plavix 75 mg oral tablet  -- 1 tab(s) by mouth once a day  -- Indication: For CAD    metoprolol succinate 100 mg oral tablet, extended release  -- 1 tab(s) by mouth once a day  -- Indication: For CAD    amLODIPine 5 mg oral tablet  -- 1 tab(s) by mouth once a day  -- Indication: For HTN    Vitamin B-12 1000 mcg sublingual tablet  -- 1 tab(s) under tongue once a day  -- Indication: For supplement

## 2018-03-09 LAB
-  AMIKACIN: SIGNIFICANT CHANGE UP
-  AMPICILLIN/SULBACTAM: SIGNIFICANT CHANGE UP
-  AMPICILLIN: SIGNIFICANT CHANGE UP
-  AZTREONAM: SIGNIFICANT CHANGE UP
-  CEFAZOLIN: SIGNIFICANT CHANGE UP
-  CEFEPIME: SIGNIFICANT CHANGE UP
-  CEFOXITIN: SIGNIFICANT CHANGE UP
-  CEFTAZIDIME: SIGNIFICANT CHANGE UP
-  CEFTRIAXONE: SIGNIFICANT CHANGE UP
-  CIPROFLOXACIN: SIGNIFICANT CHANGE UP
-  ERTAPENEM: SIGNIFICANT CHANGE UP
-  GENTAMICIN: SIGNIFICANT CHANGE UP
-  IMIPENEM: SIGNIFICANT CHANGE UP
-  LEVOFLOXACIN: SIGNIFICANT CHANGE UP
-  MEROPENEM: SIGNIFICANT CHANGE UP
-  NITROFURANTOIN: SIGNIFICANT CHANGE UP
-  PIPERACILLIN/TAZOBACTAM: SIGNIFICANT CHANGE UP
-  TIGECYCLINE: SIGNIFICANT CHANGE UP
-  TOBRAMYCIN: SIGNIFICANT CHANGE UP
-  TRIMETHOPRIM/SULFAMETHOXAZOLE: SIGNIFICANT CHANGE UP
BACTERIA UR CULT: SIGNIFICANT CHANGE UP
METHOD TYPE: SIGNIFICANT CHANGE UP
ORGANISM # SPEC MICROSCOPIC CNT: SIGNIFICANT CHANGE UP

## 2021-02-12 ENCOUNTER — INPATIENT (INPATIENT)
Facility: HOSPITAL | Age: 86
LOS: 11 days | Discharge: NURSING, REHAB OR LONG-TERM CARE | End: 2021-02-24
Attending: ORTHOPAEDIC SURGERY | Admitting: ORTHOPAEDIC SURGERY
Payer: MEDICARE

## 2021-02-12 ENCOUNTER — TRANSCRIPTION ENCOUNTER (OUTPATIENT)
Age: 86
End: 2021-02-12

## 2021-02-12 VITALS
OXYGEN SATURATION: 100 % | HEART RATE: 63 BPM | DIASTOLIC BLOOD PRESSURE: 76 MMHG | SYSTOLIC BLOOD PRESSURE: 172 MMHG | RESPIRATION RATE: 16 BRPM | HEIGHT: 58 IN

## 2021-02-12 DIAGNOSIS — Z29.9 ENCOUNTER FOR PROPHYLACTIC MEASURES, UNSPECIFIED: ICD-10-CM

## 2021-02-12 DIAGNOSIS — I10 ESSENTIAL (PRIMARY) HYPERTENSION: ICD-10-CM

## 2021-02-12 DIAGNOSIS — H54.8 LEGAL BLINDNESS, AS DEFINED IN USA: ICD-10-CM

## 2021-02-12 DIAGNOSIS — Z98.41 CATARACT EXTRACTION STATUS, RIGHT EYE: Chronic | ICD-10-CM

## 2021-02-12 DIAGNOSIS — Z98.89 OTHER SPECIFIED POSTPROCEDURAL STATES: Chronic | ICD-10-CM

## 2021-02-12 DIAGNOSIS — S72.145A NONDISPLACED INTERTROCHANTERIC FRACTURE OF LEFT FEMUR, INITIAL ENCOUNTER FOR CLOSED FRACTURE: ICD-10-CM

## 2021-02-12 DIAGNOSIS — I44.2 ATRIOVENTRICULAR BLOCK, COMPLETE: ICD-10-CM

## 2021-02-12 DIAGNOSIS — C91.10 CHRONIC LYMPHOCYTIC LEUKEMIA OF B-CELL TYPE NOT HAVING ACHIEVED REMISSION: ICD-10-CM

## 2021-02-12 DIAGNOSIS — I25.10 ATHEROSCLEROTIC HEART DISEASE OF NATIVE CORONARY ARTERY WITHOUT ANGINA PECTORIS: ICD-10-CM

## 2021-02-12 DIAGNOSIS — S72.90XA UNSPECIFIED FRACTURE OF UNSPECIFIED FEMUR, INITIAL ENCOUNTER FOR CLOSED FRACTURE: Chronic | ICD-10-CM

## 2021-02-12 DIAGNOSIS — R93.89 ABNORMAL FINDINGS ON DIAGNOSTIC IMAGING OF OTHER SPECIFIED BODY STRUCTURES: ICD-10-CM

## 2021-02-12 DIAGNOSIS — E78.5 HYPERLIPIDEMIA, UNSPECIFIED: ICD-10-CM

## 2021-02-12 DIAGNOSIS — Z86.711 PERSONAL HISTORY OF PULMONARY EMBOLISM: ICD-10-CM

## 2021-02-12 DIAGNOSIS — S72.143A DISPLACED INTERTROCHANTERIC FRACTURE OF UNSPECIFIED FEMUR, INITIAL ENCOUNTER FOR CLOSED FRACTURE: ICD-10-CM

## 2021-02-12 LAB
ALBUMIN SERPL ELPH-MCNC: 3.6 G/DL — SIGNIFICANT CHANGE UP (ref 3.3–5)
ALP SERPL-CCNC: 53 U/L — SIGNIFICANT CHANGE UP (ref 40–120)
ALT FLD-CCNC: 10 U/L — SIGNIFICANT CHANGE UP (ref 4–33)
ANION GAP SERPL CALC-SCNC: 10 MMOL/L — SIGNIFICANT CHANGE UP (ref 7–14)
APTT BLD: 29.5 SEC — SIGNIFICANT CHANGE UP (ref 27–36.3)
AST SERPL-CCNC: 16 U/L — SIGNIFICANT CHANGE UP (ref 4–32)
BASOPHILS # BLD AUTO: 0.07 K/UL — SIGNIFICANT CHANGE UP (ref 0–0.2)
BASOPHILS NFR BLD AUTO: 0.3 % — SIGNIFICANT CHANGE UP (ref 0–2)
BILIRUB SERPL-MCNC: 0.8 MG/DL — SIGNIFICANT CHANGE UP (ref 0.2–1.2)
BLD GP AB SCN SERPL QL: NEGATIVE — SIGNIFICANT CHANGE UP
BLOOD GAS VENOUS COMPREHENSIVE RESULT: SIGNIFICANT CHANGE UP
BUN SERPL-MCNC: 16 MG/DL — SIGNIFICANT CHANGE UP (ref 7–23)
CALCIUM SERPL-MCNC: 8.3 MG/DL — LOW (ref 8.4–10.5)
CHLORIDE SERPL-SCNC: 96 MMOL/L — LOW (ref 98–107)
CO2 SERPL-SCNC: 25 MMOL/L — SIGNIFICANT CHANGE UP (ref 22–31)
CREAT SERPL-MCNC: 0.54 MG/DL — SIGNIFICANT CHANGE UP (ref 0.5–1.3)
EOSINOPHIL # BLD AUTO: 0.1 K/UL — SIGNIFICANT CHANGE UP (ref 0–0.5)
EOSINOPHIL NFR BLD AUTO: 0.5 % — SIGNIFICANT CHANGE UP (ref 0–6)
GLUCOSE SERPL-MCNC: 128 MG/DL — HIGH (ref 70–99)
HCT VFR BLD CALC: 36.1 % — SIGNIFICANT CHANGE UP (ref 34.5–45)
HGB BLD-MCNC: 11.5 G/DL — SIGNIFICANT CHANGE UP (ref 11.5–15.5)
IANC: 8.98 K/UL — HIGH (ref 1.5–8.5)
IMM GRANULOCYTES NFR BLD AUTO: 0.6 % — SIGNIFICANT CHANGE UP (ref 0–1.5)
INR BLD: 1.17 RATIO — HIGH (ref 0.88–1.16)
LYMPHOCYTES # BLD AUTO: 11.09 K/UL — HIGH (ref 1–3.3)
LYMPHOCYTES # BLD AUTO: 51.6 % — HIGH (ref 13–44)
MAGNESIUM SERPL-MCNC: 1.8 MG/DL — SIGNIFICANT CHANGE UP (ref 1.6–2.6)
MCHC RBC-ENTMCNC: 29.9 PG — SIGNIFICANT CHANGE UP (ref 27–34)
MCHC RBC-ENTMCNC: 31.9 GM/DL — LOW (ref 32–36)
MCV RBC AUTO: 93.8 FL — SIGNIFICANT CHANGE UP (ref 80–100)
MONOCYTES # BLD AUTO: 1.12 K/UL — HIGH (ref 0–0.9)
MONOCYTES NFR BLD AUTO: 5.2 % — SIGNIFICANT CHANGE UP (ref 2–14)
NEUTROPHILS # BLD AUTO: 8.98 K/UL — HIGH (ref 1.8–7.4)
NEUTROPHILS NFR BLD AUTO: 41.8 % — LOW (ref 43–77)
NRBC # BLD: 0 /100 WBCS — SIGNIFICANT CHANGE UP
NRBC # FLD: 0 K/UL — SIGNIFICANT CHANGE UP
PLATELET # BLD AUTO: 233 K/UL — SIGNIFICANT CHANGE UP (ref 150–400)
POTASSIUM SERPL-MCNC: 4.5 MMOL/L — SIGNIFICANT CHANGE UP (ref 3.5–5.3)
POTASSIUM SERPL-SCNC: 4.5 MMOL/L — SIGNIFICANT CHANGE UP (ref 3.5–5.3)
PROT SERPL-MCNC: 6.1 G/DL — SIGNIFICANT CHANGE UP (ref 6–8.3)
PROTHROM AB SERPL-ACNC: 13.2 SEC — SIGNIFICANT CHANGE UP (ref 10.6–13.6)
RBC # BLD: 3.85 M/UL — SIGNIFICANT CHANGE UP (ref 3.8–5.2)
RBC # FLD: 13.7 % — SIGNIFICANT CHANGE UP (ref 10.3–14.5)
RH IG SCN BLD-IMP: POSITIVE — SIGNIFICANT CHANGE UP
SODIUM SERPL-SCNC: 131 MMOL/L — LOW (ref 135–145)
TROPONIN T, HIGH SENSITIVITY RESULT: 15 NG/L — SIGNIFICANT CHANGE UP
WBC # BLD: 21.49 K/UL — HIGH (ref 3.8–10.5)
WBC # FLD AUTO: 21.49 K/UL — HIGH (ref 3.8–10.5)

## 2021-02-12 PROCEDURE — 73502 X-RAY EXAM HIP UNI 2-3 VIEWS: CPT | Mod: 26,RT,76

## 2021-02-12 PROCEDURE — 99223 1ST HOSP IP/OBS HIGH 75: CPT

## 2021-02-12 PROCEDURE — 71260 CT THORAX DX C+: CPT | Mod: 26

## 2021-02-12 PROCEDURE — 99223 1ST HOSP IP/OBS HIGH 75: CPT | Mod: 57

## 2021-02-12 PROCEDURE — 70450 CT HEAD/BRAIN W/O DYE: CPT | Mod: 26

## 2021-02-12 PROCEDURE — 71045 X-RAY EXAM CHEST 1 VIEW: CPT | Mod: 26

## 2021-02-12 PROCEDURE — 72125 CT NECK SPINE W/O DYE: CPT | Mod: 26

## 2021-02-12 PROCEDURE — 73030 X-RAY EXAM OF SHOULDER: CPT | Mod: 26,RT

## 2021-02-12 PROCEDURE — 74177 CT ABD & PELVIS W/CONTRAST: CPT | Mod: 26

## 2021-02-12 PROCEDURE — 73552 X-RAY EXAM OF FEMUR 2/>: CPT | Mod: 26,RT

## 2021-02-12 PROCEDURE — 99285 EMERGENCY DEPT VISIT HI MDM: CPT

## 2021-02-12 PROCEDURE — 93280 PM DEVICE PROGR EVAL DUAL: CPT | Mod: 26

## 2021-02-12 RX ORDER — SENNA PLUS 8.6 MG/1
2 TABLET ORAL AT BEDTIME
Refills: 0 | Status: DISCONTINUED | OUTPATIENT
Start: 2021-02-12 | End: 2021-02-13

## 2021-02-12 RX ORDER — TRAMADOL HYDROCHLORIDE 50 MG/1
50 TABLET ORAL EVERY 4 HOURS
Refills: 0 | Status: DISCONTINUED | OUTPATIENT
Start: 2021-02-12 | End: 2021-02-13

## 2021-02-12 RX ORDER — PREGABALIN 225 MG/1
1000 CAPSULE ORAL DAILY
Refills: 0 | Status: DISCONTINUED | OUTPATIENT
Start: 2021-02-12 | End: 2021-02-13

## 2021-02-12 RX ORDER — KETOROLAC TROMETHAMINE 30 MG/ML
15 SYRINGE (ML) INJECTION ONCE
Refills: 0 | Status: DISCONTINUED | OUTPATIENT
Start: 2021-02-12 | End: 2021-02-12

## 2021-02-12 RX ORDER — TRAMADOL HYDROCHLORIDE 50 MG/1
25 TABLET ORAL EVERY 4 HOURS
Refills: 0 | Status: DISCONTINUED | OUTPATIENT
Start: 2021-02-12 | End: 2021-02-13

## 2021-02-12 RX ORDER — KETOROLAC TROMETHAMINE 30 MG/ML
10 SYRINGE (ML) INJECTION EVERY 6 HOURS
Refills: 0 | Status: DISCONTINUED | OUTPATIENT
Start: 2021-02-12 | End: 2021-02-12

## 2021-02-12 RX ORDER — METOPROLOL TARTRATE 50 MG
100 TABLET ORAL DAILY
Refills: 0 | Status: DISCONTINUED | OUTPATIENT
Start: 2021-02-12 | End: 2021-02-13

## 2021-02-12 RX ORDER — AMLODIPINE BESYLATE 2.5 MG/1
5 TABLET ORAL DAILY
Refills: 0 | Status: DISCONTINUED | OUTPATIENT
Start: 2021-02-12 | End: 2021-02-13

## 2021-02-12 RX ORDER — POVIDONE-IODINE 5 %
1 AEROSOL (ML) TOPICAL ONCE
Refills: 0 | Status: COMPLETED | OUTPATIENT
Start: 2021-02-12 | End: 2021-02-13

## 2021-02-12 RX ORDER — MAGNESIUM HYDROXIDE 400 MG/1
30 TABLET, CHEWABLE ORAL DAILY
Refills: 0 | Status: DISCONTINUED | OUTPATIENT
Start: 2021-02-12 | End: 2021-02-13

## 2021-02-12 RX ORDER — HEPARIN SODIUM 5000 [USP'U]/ML
5000 INJECTION INTRAVENOUS; SUBCUTANEOUS ONCE
Refills: 0 | Status: COMPLETED | OUTPATIENT
Start: 2021-02-12 | End: 2021-02-12

## 2021-02-12 RX ORDER — KETOROLAC TROMETHAMINE 30 MG/ML
15 SYRINGE (ML) INJECTION EVERY 6 HOURS
Refills: 0 | Status: DISCONTINUED | OUTPATIENT
Start: 2021-02-12 | End: 2021-02-14

## 2021-02-12 RX ORDER — SODIUM CHLORIDE 9 MG/ML
1000 INJECTION INTRAMUSCULAR; INTRAVENOUS; SUBCUTANEOUS
Refills: 0 | Status: DISCONTINUED | OUTPATIENT
Start: 2021-02-12 | End: 2021-02-13

## 2021-02-12 RX ORDER — PANTOPRAZOLE SODIUM 20 MG/1
40 TABLET, DELAYED RELEASE ORAL
Refills: 0 | Status: DISCONTINUED | OUTPATIENT
Start: 2021-02-12 | End: 2021-02-13

## 2021-02-12 RX ORDER — ISOSORBIDE MONONITRATE 60 MG/1
60 TABLET, EXTENDED RELEASE ORAL DAILY
Refills: 0 | Status: DISCONTINUED | OUTPATIENT
Start: 2021-02-12 | End: 2021-02-13

## 2021-02-12 RX ORDER — SIMVASTATIN 20 MG/1
20 TABLET, FILM COATED ORAL AT BEDTIME
Refills: 0 | Status: DISCONTINUED | OUTPATIENT
Start: 2021-02-12 | End: 2021-02-13

## 2021-02-12 RX ORDER — RANOLAZINE 500 MG/1
500 TABLET, FILM COATED, EXTENDED RELEASE ORAL DAILY
Refills: 0 | Status: DISCONTINUED | OUTPATIENT
Start: 2021-02-12 | End: 2021-02-13

## 2021-02-12 RX ORDER — GABAPENTIN 400 MG/1
100 CAPSULE ORAL EVERY 8 HOURS
Refills: 0 | Status: DISCONTINUED | OUTPATIENT
Start: 2021-02-12 | End: 2021-02-13

## 2021-02-12 RX ORDER — CHLORHEXIDINE GLUCONATE 213 G/1000ML
1 SOLUTION TOPICAL ONCE
Refills: 0 | Status: COMPLETED | OUTPATIENT
Start: 2021-02-12 | End: 2021-02-13

## 2021-02-12 RX ORDER — ISOSORBIDE MONONITRATE 60 MG/1
60 TABLET, EXTENDED RELEASE ORAL DAILY
Refills: 0 | Status: DISCONTINUED | OUTPATIENT
Start: 2021-02-12 | End: 2021-02-12

## 2021-02-12 RX ORDER — CITALOPRAM 10 MG/1
20 TABLET, FILM COATED ORAL DAILY
Refills: 0 | Status: DISCONTINUED | OUTPATIENT
Start: 2021-02-12 | End: 2021-02-13

## 2021-02-12 RX ORDER — LANOLIN ALCOHOL/MO/W.PET/CERES
3 CREAM (GRAM) TOPICAL AT BEDTIME
Refills: 0 | Status: DISCONTINUED | OUTPATIENT
Start: 2021-02-12 | End: 2021-02-13

## 2021-02-12 RX ORDER — HYDROMORPHONE HYDROCHLORIDE 2 MG/ML
0.5 INJECTION INTRAMUSCULAR; INTRAVENOUS; SUBCUTANEOUS ONCE
Refills: 0 | Status: DISCONTINUED | OUTPATIENT
Start: 2021-02-12 | End: 2021-02-12

## 2021-02-12 RX ADMIN — SENNA PLUS 2 TABLET(S): 8.6 TABLET ORAL at 22:59

## 2021-02-12 RX ADMIN — Medication 15 MILLIGRAM(S): at 15:16

## 2021-02-12 RX ADMIN — HYDROMORPHONE HYDROCHLORIDE 0.5 MILLIGRAM(S): 2 INJECTION INTRAMUSCULAR; INTRAVENOUS; SUBCUTANEOUS at 19:39

## 2021-02-12 RX ADMIN — Medication 15 MILLIGRAM(S): at 11:25

## 2021-02-12 RX ADMIN — SODIUM CHLORIDE 75 MILLILITER(S): 9 INJECTION INTRAMUSCULAR; INTRAVENOUS; SUBCUTANEOUS at 22:46

## 2021-02-12 RX ADMIN — TRAMADOL HYDROCHLORIDE 25 MILLIGRAM(S): 50 TABLET ORAL at 22:58

## 2021-02-12 RX ADMIN — SIMVASTATIN 20 MILLIGRAM(S): 20 TABLET, FILM COATED ORAL at 22:58

## 2021-02-12 RX ADMIN — Medication 3 MILLIGRAM(S): at 22:59

## 2021-02-12 RX ADMIN — GABAPENTIN 100 MILLIGRAM(S): 400 CAPSULE ORAL at 22:57

## 2021-02-12 RX ADMIN — Medication 0.5 MILLIGRAM(S): at 15:17

## 2021-02-12 RX ADMIN — HEPARIN SODIUM 5000 UNIT(S): 5000 INJECTION INTRAVENOUS; SUBCUTANEOUS at 22:57

## 2021-02-12 NOTE — ED PROVIDER NOTE - PHYSICAL EXAMINATION
Gen: AAOx3, non-toxic  Head: NCAT  HEENT: EOMI, PERRLA, oral mucosa moist, normal conjunctiva  Lung: CTAB, no respiratory distress, no wheezes/rhonchi/rales B/L, speaking in full sentences  CV: RRR, no murmurs, rubs or gallops  Abd: soft, diffuse abd TTP ND, no guarding, no CVA tenderness, no rebound tenderness  MSK: LLE everted with TTP of the left lateral hip and limited ROM. DP pulse 2+ b/l and eq. TTP of midline cervical and lumbar spine  Neuro: No focal sensory or motor deficits  Skin: Warm, well perfused, no rash  Psych: normal affect.   ~Jennifer Melgoza MD

## 2021-02-12 NOTE — ED ADULT TRIAGE NOTE - CHIEF COMPLAINT QUOTE
Pt brought in by EMS from home after fall at home last night. Pt states she was trying to get to bathroom when she tripped and fell. Pt c/o left hip pain. Pt left leg shortened and externally rotated. Pt denies cp, dizziness, weakness. PMH CLL, Pacemaker, Dementia, Stents, HTN. Pt on 2L home O2. Denies AC usage/ LOC/ Head trauma. Unable to obtain temp in triage

## 2021-02-12 NOTE — CONSULT NOTE ADULT - PROBLEM SELECTOR RECOMMENDATION 3
c/w home medications, patient was unable to verify home meds on my exam  patient complaining of chronic intermittent chest pain, unchanged from earlier, per cards no further w/u required

## 2021-02-12 NOTE — CONSULT NOTE ADULT - PROBLEM SELECTOR RECOMMENDATION 8
#possible R distal femoral arterial occlusion noted on imaging - given unable to palpate DP/PT pulses on exam (however (+) bilat doppler signals), would check Arterial dopplers for further eval  #Thyroid nodule - check TSH  #pleural effusion on imaging - R>L, check pro-BNP in AM, monitor I/Os  #splenic lesion - unknown chronicity, new since 2012 imaging, would discuss with patient when more alert, likely can pursue further w/u as outpatient if desired #possible R distal femoral arterial occlusion noted on imaging (vs. artifact) - given unable to palpate DP/PT pulses on exam (however (+) bilat doppler signals), would check Arterial dopplers for further eval  #Thyroid nodule - check TSH  #pleural effusion on CT - R>L, unclear etiology, check pro-BNP in AM, monitor I/Os, on O2 via NC, 1lpm on my exam, f/u COVID PCR, afebrile, no coughing.  #splenic lesion - unknown chronicity, new since 2012 imaging, would discuss with patient when more alert, likely can pursue further w/u as outpatient if desired

## 2021-02-12 NOTE — CHART NOTE - NSCHARTNOTEFT_GEN_A_CORE
Discussed with Cardiology José Manuel Daily ([phone]) the ECG findings. The patient is being paced by the pacemaker. He also reviewed the PPM interrogation. He reports no additional cardiology testing needed before surgery tomorrow.

## 2021-02-12 NOTE — CONSULT NOTE ADULT - PROBLEM SELECTOR RECOMMENDATION 9
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

## 2021-02-12 NOTE — ED ADULT NURSE REASSESSMENT NOTE - NS ED NURSE REASSESS COMMENT FT1
Pt awake, alert and oriented x 2 at baseline.   Pt very anxious with C-collar in place.   Ativan given for anxiety and agitation and toradol given for left side and hip pain.   IV site unremarkable.    Pt taken to xrays and CT.   awaiting urine output for UA and urine cx.

## 2021-02-12 NOTE — CONSULT NOTE ADULT - ASSESSMENT
89-year-old female, legally blind with CAD s/p stent placement, CHB S/P PPM, carotid stenosis s/p CEA, HTN, HLD, CLL, presenting with L hip pain s/p unwitnessed mechanical fall.

## 2021-02-12 NOTE — H&P ADULT - HISTORY OF PRESENT ILLNESS
89 y/o Female A&O x3, legally blind with PMH of CAD S/P stent placement, CHB S/P PPM placement, carotid stenosis s/p carotid endarterectomy, HTN, HLD, CLL, s/p intramedullary nailing of right/left femur Right hip in 2012 with Dr. Blake presents to [facility] with c/o L hip pain s/p unwitnessed mechanical fall. Pt states she is unsure of when she fell, either yesterday or this morning. States she lives with her sister and her niece but they didn't see her fall, niece assisted her after fall. Spoke to Angelito (grand-nephew) regarding great aunt since he makes all her medical decisions. States that she fell on Sunday but was able to ambulate after, then they believe she fell this morning at 8AM and since has been unable to ambulate, and was complaining of pain to the left hip. Pt denies fevers/chills, headstrike or LOC, numbness/tingling or any other orthopedic pain/injury. At baseline, pt ambulates with cane/walker/without any assistive devices.       PAST MEDICAL & SURGICAL HISTORY:  Legally blind  Complete heart block  CAD  HTN  Hyperlipemia  Pulmonary embolism  Cancer of Uterus  CLL (Chronic Lymphoblastic Leukemia)    ORIF right femur fracture, 2012  H/O bilateral cataract extraction  MAXX-BSO  History of tonsillectomy  retina and glaucoma surgery left eye -here 12/11, 2/12 and 3/12  baerveldt implant left eye  History of Carotid Endarterectomy on L 2009  Coronary Stent 2006 x 3 LAD, Circ, RCA  Cardiac Pacemaker 2009  right cornea implant      Allergies:  morphine (Other)  oxycodone (Unknown)  Percocet 5/325 (Unknown)  pilocarpine (Other)                   11.5   21.49 )-----------( 233      ( 12 Feb 2021 11:46 )             36.1     12 Feb 2021 11:46    131    |  96     |  16     ----------------------------<  128    4.5     |  25     |  0.54     Ca    8.3        12 Feb 2021 11:46  Mg     1.8       12 Feb 2021 11:46    TPro  6.1    /  Alb  3.6    /  TBili  0.8    /  DBili  x      /  AST  16     /  ALT  10     /  AlkPhos  53     12 Feb 2021 11:46    PT/INR - ( 12 Feb 2021 11:46 )   PT: 13.2 sec;   INR: 1.17 ratio         PTT - ( 12 Feb 2021 11:46 )  PTT:29.5 sec  Vital Signs Last 24 Hrs  T(C): 36.8 (02-12-21 @ 14:00), Max: 36.9 (02-12-21 @ 10:27)  T(F): 98.3 (02-12-21 @ 14:00), Max: 98.5 (02-12-21 @ 10:27)  HR: 83 (02-12-21 @ 14:00) (63 - 83)  BP: 118/41 (02-12-21 @ 14:00) (118/41 - 172/76)  BP(mean): --  RR: 16 (02-12-21 @ 14:00) (16 - 16)  SpO2: 98% (02-12-21 @ 14:00) (96% - 100%)      Imaging: XR demonstates L intertrochanteric fracture  < from: CT Head No Cont (02.12.21 @ 16:20) >  EXAM: CT BRAIN    EXAM:  CT CERVICAL SPINE   PROCEDURE DATE:  Feb 12 2021     IMPRESSION:  CT Head: No acute intracranial hemorrhage, midline shift or mass effect. Chronic changes as above.  CT Cervical Spine: No acute fracture or dislocation. Chronic changes as above.  < end of copied text >    < from: CT Chest w/ IV Cont (02.12.21 @ 16:20) >  EXAM:  CT CHEST IC    EXAM:  CT ABDOMEN AND PELVIS IC    PROCEDURE DATE:  Feb 12 2021       IMPRESSION:  Partially loculated small left and moderate right-sided pleural effusions.  Acute left comminuted intertrochanteric fracture.  Indeterminant splenic lesion.  Filling defect in the right distal femoral artery, possibly occlusion or artifact.      < from: Xray Femur 1 View, Left (02.12.21 @ 15:48) >  EXAM:  XR HIP 2-3V RT    EXAM:  XR FEMUR 2 VIEWS RT    EXAM:  RAD PELVIS AP ONLY    EXAM:  XR FEMUR 1 VIEW LT   PROCEDURE DATE:  Feb 12 2021     IMPRESSION:  Redemonstrated varus angulated proximal left femoral intertrochanteric fracture with lesser trochanteric avulsion.  Intact and uncomplicated right femoral short IM aleks/nail with proximal compression screw and distal interlocking screw and with underlying anatomic alignment grossly maintained.  No dislocationsor additional fractures in remaining imaged regions.  Intact pelvic and obturator rings.  Preserved bilateral hip joint spaces. Bilateral knee osteoarthritis and bilateral hip pubic symphysis and knee chondrocalcinosis.  Generalized osteopenia otherwise no discrete lytic or blastic lesions.  Vascular calcifications.      < from: Xray Shoulder 2 Views, Right (02.12.21 @ 15:46) >  EXAM:  RAD SHOULDER RT    PROCEDURE DATE:  Feb 12 2021     No fractures, dislocations, or AC separation.  Moderate AC and moderate glenohumeral joint osteoarthrosis.          Physical Exam  General: NAD, Alert, Awake and oriented x3  Neurologic: No gross deficits, moving all 4s.  Head: NCAT without abrasions, lacerations, or ecchymosis to head, face, or scalp    HIPS and PELVIS: Unable to passively SLR Left Hip 2/2 to pain    RIGHT LE:   No open skin.   No deformities or other signs of trauma at  knee, lower leg, ankle or foot.   Full baseline painless PROM at ankle and toes with. Negative log-roll and heel strike.   Able to passively SLR.   SILT toes 1-5. 2+ DP/PT pulses with brisk cap refill distally.   Compartments soft and compressible.     LEFT LE:   No open skin.   Externally rotated. No deformities or other signs of trauma at knee, lower leg, ankle or foot.   Full baseline painless ROM at ankle and toes with Positive log-roll and heel strike.   Unable to actively/passively SLR.   SILT toes 1-5. 2+ DP/PT pulses with brisk cap refill distally.   Compartments soft and compressible.     BL UE:   Complaining of R shoulder pain w/ passive ROM  No open skin.   No obvious deformities or other signs of trauma at shoulder, upper arm, elbow, forearm, wrist or hand.    Full baseline painless passive ROM at shoulder, elbow, wrist, and . No bony TTP.   Compartments soft and compressible.  strength symmetric.      A/P: 89 y/o Female with L hip fracture pending orthopedic surgery  -Admit to Orthopedics  -Pain control  -NPO at midnight, IVF   -Hold anticoagulation at midnight  -NWB L LE, bedrest  -FU Medical/Cards clearance/optimization for OR  -FU EP interrogation of Pacemaker  -FU labs  -FU COVID swab  -Plan to be discussed with orthopedic attending on-call, Dr. Menon   89 y/o Female A&O x3, legally blind with PMH of CAD S/P stent placement, CHB S/P PPM placement, carotid stenosis s/p carotid endarterectomy, HTN, HLD, CLL, s/p intramedullary nailing of right/left femur Right hip in 2012 with Dr. Blake presents to [facility] with c/o L hip pain s/p unwitnessed mechanical fall. Pt states she is unsure of when she fell, either yesterday or this morning. States she lives with her sister and her niece but they didn't see her fall, niece assisted her after fall. Spoke to Angelito (grand-nephew) regarding great aunt since he makes all her medical decisions. States that she fell on Sunday but was able to ambulate after, then they believe she fell this morning at 8AM and since has been unable to ambulate, and was complaining of pain to the left hip. Pt denies fevers/chills, headstrike or LOC, numbness/tingling or any other orthopedic pain/injury. At baseline, pt ambulates with walker and assistance from home aide.      PAST MEDICAL & SURGICAL HISTORY:  Legally blind  Complete heart block  CAD  HTN  Hyperlipemia  Pulmonary embolism  Cancer of Uterus  CLL (Chronic Lymphoblastic Leukemia)    ORIF right femur fracture, 2012  H/O bilateral cataract extraction  MAXX-BSO  History of tonsillectomy  retina and glaucoma surgery left eye -here 12/11, 2/12 and 3/12  baerveldt implant left eye  History of Carotid Endarterectomy on L 2009  Coronary Stent 2006 x 3 LAD, Circ, RCA  Cardiac Pacemaker 2009  right cornea implant      Allergies:  morphine (Other)  oxycodone (Unknown)  Percocet 5/325 (Unknown)  pilocarpine (Other)                   11.5   21.49 )-----------( 233      ( 12 Feb 2021 11:46 )             36.1     12 Feb 2021 11:46    131    |  96     |  16     ----------------------------<  128    4.5     |  25     |  0.54     Ca    8.3        12 Feb 2021 11:46  Mg     1.8       12 Feb 2021 11:46    TPro  6.1    /  Alb  3.6    /  TBili  0.8    /  DBili  x      /  AST  16     /  ALT  10     /  AlkPhos  53     12 Feb 2021 11:46    PT/INR - ( 12 Feb 2021 11:46 )   PT: 13.2 sec;   INR: 1.17 ratio         PTT - ( 12 Feb 2021 11:46 )  PTT:29.5 sec  Vital Signs Last 24 Hrs  T(C): 36.8 (02-12-21 @ 14:00), Max: 36.9 (02-12-21 @ 10:27)  T(F): 98.3 (02-12-21 @ 14:00), Max: 98.5 (02-12-21 @ 10:27)  HR: 83 (02-12-21 @ 14:00) (63 - 83)  BP: 118/41 (02-12-21 @ 14:00) (118/41 - 172/76)  BP(mean): --  RR: 16 (02-12-21 @ 14:00) (16 - 16)  SpO2: 98% (02-12-21 @ 14:00) (96% - 100%)      Imaging: XR demonstates L intertrochanteric fracture  < from: CT Head No Cont (02.12.21 @ 16:20) >  EXAM: CT BRAIN    EXAM:  CT CERVICAL SPINE   PROCEDURE DATE:  Feb 12 2021     IMPRESSION:  CT Head: No acute intracranial hemorrhage, midline shift or mass effect. Chronic changes as above.  CT Cervical Spine: No acute fracture or dislocation. Chronic changes as above.  < end of copied text >    < from: CT Chest w/ IV Cont (02.12.21 @ 16:20) >  EXAM:  CT CHEST IC    EXAM:  CT ABDOMEN AND PELVIS IC    PROCEDURE DATE:  Feb 12 2021       IMPRESSION:  Partially loculated small left and moderate right-sided pleural effusions.  Acute left comminuted intertrochanteric fracture.  Indeterminant splenic lesion.  Filling defect in the right distal femoral artery, possibly occlusion or artifact.      < from: Xray Femur 1 View, Left (02.12.21 @ 15:48) >  EXAM:  XR HIP 2-3V RT    EXAM:  XR FEMUR 2 VIEWS RT    EXAM:  RAD PELVIS AP ONLY    EXAM:  XR FEMUR 1 VIEW LT   PROCEDURE DATE:  Feb 12 2021     IMPRESSION:  Redemonstrated varus angulated proximal left femoral intertrochanteric fracture with lesser trochanteric avulsion.  Intact and uncomplicated right femoral short IM aleks/nail with proximal compression screw and distal interlocking screw and with underlying anatomic alignment grossly maintained.  No dislocationsor additional fractures in remaining imaged regions.  Intact pelvic and obturator rings.  Preserved bilateral hip joint spaces. Bilateral knee osteoarthritis and bilateral hip pubic symphysis and knee chondrocalcinosis.  Generalized osteopenia otherwise no discrete lytic or blastic lesions.  Vascular calcifications.      < from: Xray Shoulder 2 Views, Right (02.12.21 @ 15:46) >  EXAM:  RAD SHOULDER RT    PROCEDURE DATE:  Feb 12 2021     No fractures, dislocations, or AC separation.  Moderate AC and moderate glenohumeral joint osteoarthrosis.          Physical Exam  General: NAD, Alert, Awake and oriented x3  Neurologic: No gross deficits, moving all 4s.  Head: NCAT without abrasions, lacerations, or ecchymosis to head, face, or scalp    HIPS and PELVIS: Unable to passively SLR Left Hip 2/2 to pain    RIGHT LE:   No open skin.   No deformities or other signs of trauma at  knee, lower leg, ankle or foot.   Full baseline painless PROM at ankle and toes with. Negative log-roll and heel strike.   Able to passively SLR.   SILT toes 1-5. 2+ DP/PT pulses with brisk cap refill distally.   Compartments soft and compressible.     LEFT LE:   No open skin.   Externally rotated. No deformities or other signs of trauma at knee, lower leg, ankle or foot.   Full baseline painless ROM at ankle and toes with Positive log-roll and heel strike.   Unable to actively/passively SLR.   SILT toes 1-5. 2+ DP/PT pulses with brisk cap refill distally.   Compartments soft and compressible.     BL UE:   Complaining of R shoulder pain w/ passive ROM  No open skin.   No obvious deformities or other signs of trauma at shoulder, upper arm, elbow, forearm, wrist or hand.    Full baseline painless passive ROM at shoulder, elbow, wrist, and . No bony TTP.   Compartments soft and compressible.  strength symmetric.      A/P: 89 y/o Female with L hip fracture pending orthopedic surgery  -Admit to Orthopedics  -Pain control  -NPO at midnight, IVF   -Hold anticoagulation at midnight  -NWB L LE, bedrest  -FU Medical/Cards clearance/optimization for OR  -FU EP interrogation of Pacemaker  -FU labs  -FU COVID swab  -Plan to be discussed with orthopedic attending on-call, Dr. Menon

## 2021-02-12 NOTE — H&P ADULT - ATTENDING COMMENTS
Agree with resident assessment and plan. Patient seen and examined.     Briefly, 89 y/o Female presents to [facility] with c/o L hip pain s/p unwitnessed mechanical fall. Complaining of pain to the left hip. Pt denies fevers/chills, headstrike or LOC, numbness/tingling or any other orthopedic pain/injury. At baseline, pt ambulates with walker and assistance from home aide.    PAST MEDICAL & SURGICAL HISTORY:  Legally blind  Complete heart block  CAD  HTN  Hyperlipemia  Pulmonary embolism  Cancer of Uterus  CLL (Chronic Lymphoblastic Leukemia)  ORIF right femur fracture, 2012  H/O bilateral cataract extraction  MAXX-BSO  History of tonsillectomy  retina and glaucoma surgery left eye -here 12/11, 2/12 and 3/12  baerveldt implant left eye  History of Carotid Endarterectomy on L 2009  Coronary Stent 2006 x 3 LAD, Circ, RCA  Cardiac Pacemaker 2009  right cornea implant    Physical Exam  General: NAD, Alert, Awake and oriented x3  Neurologic: No gross deficits, moving all 4s.  Head: NCAT without abrasions, lacerations, or ecchymosis to head, face, or scalp    HIPS and PELVIS: Unable to passively SLR Left Hip 2/2 to pain    RIGHT LE:   No open skin.   No deformities or other signs of trauma at  knee, lower leg, ankle or foot.   Full baseline painless PROM at ankle and toes with. Negative log-roll and heel strike.   Able to passively SLR.   SILT toes 1-5. 2+ DP/PT pulses with brisk cap refill distally.   Compartments soft and compressible.     LEFT LE:   No open skin.   Externally rotated. No deformities or other signs of trauma at knee, lower leg, ankle or foot.   Full baseline painless ROM at ankle and toes with Positive log-roll and heel strike.   Unable to actively/passively SLR.   SILT toes 1-5. 2+ DP/PT pulses with brisk cap refill distally.   Compartments soft and compressible.     XR: Displaced L 3-part IT hip fx    A/P: 89 y/o F with L IT hip fx  -Pain control  -Pre-op for IMN L hip. All risks, benefits and alternatives discussed with nephew in detail including but not limited to pain, bleeding, infection, malunion, nonunion, failure, medical complications (including DVT, PE, MI) and risks of anesthesia. Electnig to proceed with IMN. Informed consent obtained.   -Appreciate Medical/Cards clearance/optimization for OR  -Will f/u post-op

## 2021-02-12 NOTE — ED PROVIDER NOTE - NS ED ROS FT
GENERAL: No fever or chills, EYES: no change in vision, HEENT: no trouble speaking, CARDIAC: no chest pain, palpitation PULMONARY: no cough or SOB, GI: + abdominal pain, no nausea, no vomiting, no diarrhea or constipation, : No changes in urination, SKIN: no rashes, NEURO: no headache,  MSK: +hip, +neck pain ~Aluko Gift, MD

## 2021-02-12 NOTE — ED ADULT NURSE NOTE - OBJECTIVE STATEMENT
Pt awake, alert and oriented x 2 with PMH dementia - pt fell today at home in bathroom - does not remember if she hit her head but c/o headache and neck pain.   Pt also c/o left side, shoulder and hip and leg pain 9/10.   PMH AICD.   C-collar placed; VSS.   Pt changed, turned and positioned with no skin breakdown noted.   Respirations even and unlabored.   Awaiting further plan of care.

## 2021-02-12 NOTE — ED ADULT NURSE REASSESSMENT NOTE - NS ED NURSE REASSESS COMMENT FT1
Report given to [facility] 2 NADYA Love. Pt medicated per md orders for left shoulder pain 5/10. IVF in progress. Respirations even and unlabored, sating 96% on 4L NC.

## 2021-02-12 NOTE — ED ADULT NURSE REASSESSMENT NOTE - NS ED NURSE REASSESS COMMENT FT1
Pt awake, alert and oriented x 2 at baseline.   c/o left side, hip, leg and shoulder pain after toradol given.   VSS.   IV site unremarkable.   C-collar remains in place.   Awaiting xrays and CT.

## 2021-02-12 NOTE — ED PROVIDER NOTE - ATTENDING CONTRIBUTION TO CARE
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  Also with pain in neck after fall.  Unclear if head injury or LOC.  On exam pt chronically-ill appearing, in mild distress due to pain, heart RRR, lungs CTAB, abd NTND, extremities without swelling, left LE shortened and rotated internally and with TTP over lateral left hip, and skin without rash.  Midline cervical spine with TTP and unable to clear from C collar on arrival.

## 2021-02-12 NOTE — CONSULT NOTE ADULT - SUBJECTIVE AND OBJECTIVE BOX
HPI:   89-year-old female, legally blind with CAD s/p stent placement, CHB S/P PPM, carotid stenosis s/p CEA, HTN, HLD, CLL, presenting with L hip pain s/p unwitnessed mechanical fall. Pt states she is unsure of when she fell, either yesterday or this morning. Patient lives with her sister and her niece but they didn't see her fall, niece assisted her after fall. Patient assigned her grand-nephew, Angelito, as her medical decision maker. Per family, patient fell on Sunday but was able to ambulate after, then they believe she fell this morning at 8AM and since has been unable to ambulate, and was complaining of pain to the left hip. Pt denies fevers/chills, LH/dizziness, head trauma or LOC, presyncope/syncope, numbness/tingling or any other orthopedic pain/injury. At baseline, pt ambulates with walker and assistance from home aide. Patient reports chronic chest pain but denies acute exacerbation.     In the ED VS: 98.5  63-85  118-172/41-76  16  % on 3-4L NC, received hydromorphone 0.5mg IV x1, ketorolac 15mg IV x2, lorazepam 0.5mg IV x1    History limited due to: [ ] Dementia  [ ] Delirium  [ ] Condition    Pain Symptoms if applicable:             	                         none	   mild         moderate         severe  	                            0	    1-3	     4-6	         7-10  Pain:  Location:	  Modifying factors:	  Associated symptoms:	    Function: [ ] Independent  [ ] Assistance  [ ] Total care  [ ] Non-ambulatory    REVIEW OF SYSTEMS:    CONSTITUTIONAL: No fever, weight loss, or fatigue  EYES: No eye pain, visual disturbances, or discharge  ENMT:  No difficulty hearing, tinnitus, vertigo; No sinus or throat pain  NECK: No pain or stiffness  BREASTS: No pain, masses, or nipple discharge  RESPIRATORY: No cough, wheezing, chills or hemoptysis; No shortness of breath  CARDIOVASCULAR: No chest pain, palpitations, dizziness, or leg swelling  GASTROINTESTINAL: No abdominal or epigastric pain. No nausea, vomiting, or hematemesis; No diarrhea or constipation. No melena or hematochezia.  GENITOURINARY: No dysuria, frequency, hematuria, or incontinence  NEUROLOGICAL: No headaches, memory loss, loss of strength, numbness, or tremors  SKIN: No itching, burning, rashes, or lesions   LYMPH NODES: No enlarged glands  ENDOCRINE: No heat or cold intolerance; No hair loss  MUSCULOSKELETAL: No muscle or back pain  PSYCHIATRIC: No depression, anxiety, mood swings, or difficulty sleeping  HEME/LYMPH: No easy bruising, or bleeding gums  ALLERGY AND IMMUNOLOGIC: No hives or eczema    [  ] All other ROS negative  [  ] Unable to obtain due to poor mental status    ALLERGIES:  morphine (Other)  oxycodone (Unknown)  Percocet 5/325 (Unknown)  pilocarpine (Other)    HOME MEDICATIONS: [ ] Reviewed    PAST MEDICAL HISTORY:   Legally blind  Complete heart block  CAD  HTN  Hyperlipemia  Pulmonary embolism  Cancer of Uterus  CLL (Chronic Lymphoblastic Leukemia)  Osteoarthritis  Cataract  Osteoporosis  Narrow-Angle Glaucoma    PAST SURGICAL HISTORY:  ORIF right femur fracture, 2012  H/O bilateral cataract extraction  MAXX-BSO  History of tonsillectomy  retina and glaucoma surgery left eye -here 12/11, 2/12 and 3/12  baerveldt implant left eye  History of Carotid Endarterectomy on L 2009  Coronary Stent 2006 x 3 LAD, Circ, RCA  Cardiac Pacemaker 2009  right cornea implant    FAMILY HISTORY:     [x] Reviewed PMH/PSH/FHx    SOCIAL HISTORY:  Residence: [ ] [facility]  [ ] [facility]  [ ] Community  [ ] Substance abuse:   [ ] Tobacco:   [ ] Alcohol use:     MEDICATIONS  (STANDING):  amLODIPine   Tablet 5 milliGRAM(s) Oral daily  chlorhexidine 2% Cloths 1 Application(s) Topical once  citalopram 20 milliGRAM(s) Oral daily  cyanocobalamin 1000 MICROGram(s) Oral daily  gabapentin 100 milliGRAM(s) Oral every 8 hours  isosorbide   mononitrate ER Tablet (IMDUR) 60 milliGRAM(s) Oral daily  melatonin 3 milliGRAM(s) Oral at bedtime  metoprolol succinate  milliGRAM(s) Oral daily  pantoprazole    Tablet 40 milliGRAM(s) Oral before breakfast  povidone iodine 5% Nasal Swab 1 Application(s) Both Nostrils once  ranolazine 500 milliGRAM(s) Oral daily  senna 2 Tablet(s) Oral at bedtime  simvastatin 20 milliGRAM(s) Oral at bedtime  sodium chloride 0.9%. 1000 milliLiter(s) (75 mL/Hr) IV Continuous <Continuous>    MEDICATIONS  (PRN):  ketorolac   Injectable 15 milliGRAM(s) IV Push every 6 hours PRN Severe Breakthrough Pain  magnesium hydroxide Suspension 30 milliLiter(s) Oral daily PRN Constipation  traMADol 25 milliGRAM(s) Oral every 4 hours PRN Moderate Pain (4 - 6)  traMADol 50 milliGRAM(s) Oral every 4 hours PRN Severe Pain (7 - 10)    PHYSICAL EXAM:   Vital Signs Last 24 Hrs  T(C): 36.6 (12 Feb 2021 22:32), Max: 36.9 (12 Feb 2021 10:27)  T(F): 97.8 (12 Feb 2021 22:32), Max: 98.5 (12 Feb 2021 10:27)  HR: 70 (12 Feb 2021 22:32) (63 - 85)  BP: 128/43 (12 Feb 2021 22:32) (118/41 - 172/76)  BP(mean): --  RR: 16 (12 Feb 2021 22:32) (16 - 16)  SpO2: 96% (12 Feb 2021 22:32) (96% - 100%)    GENERAL: NAD, well-groomed, well-developed  HEAD:  Atraumatic, Normocephalic  EYES: EOMI, PERRLA, conjunctiva and sclera clear  ENMT: Moist mucous membranes  NECK: Supple, No JVD  RESPIRATORY: Clear to auscultation bilaterally; No rales, rhonchi, wheezing, or rubs  CARDIOVASCULAR: Regular rate and rhythm; No murmurs, rubs, or gallops  GASTROINTESTINAL: Soft, Nontender, Nondistended; Bowel sounds present  GENITOURINARY: Not examined  EXTREMITIES:  2+ Peripheral Pulses, No clubbing, cyanosis, or edema  NERVOUS SYSTEM:  Alert & Oriented X3; Moving all 4 extremities; No gross sensory deficits  HEME/LYMPH: No lymphadenopathy noted  SKIN: No rashes or lesions; Incisions C/D/I    LABS:                        11.5   21.49 )-----------( 233      ( 12 Feb 2021 11:46 )             36.1     02-12    131<L>  |  96<L>  |  16  ----------------------------<  128<H>  4.5   |  25  |  0.54    Ca    8.3<L>      12 Feb 2021 11:46  Mg     1.8     02-12    TPro  6.1  /  Alb  3.6  /  TBili  0.8  /  DBili  x   /  AST  16  /  ALT  10  /  AlkPhos  53  02-12    PT/INR - ( 12 Feb 2021 11:46 )   PT: 13.2 sec;   INR: 1.17 ratio    PTT - ( 12 Feb 2021 11:46 )  PTT:29.5 sec    RADIOLOGY & ADDITIONAL STUDIES:  < from: CT Head No Cont (02.12.21 @ 16:20) >/< from: CT Cervical Spine No Cont (02.12.21 @ 16:20) >  HEAD: No acute intracranial hemorrhage. No midline shift, mass or mass effect. Ventricles and sulci are prominent, compatible with age-related generalized cerebral volume loss. No CT evidence for acute territorial infarct. Periventricular hypoattenuation is present, likely reflecting moderate chronic microvascular ischemic changes. The paranasal sinuses are clear. No significant mastoid effusion. The calvarium is intact. The left globe is moderately shrunken. There are bilateral ocular prostheses present.   CERVICAL SPINE: Grade 1 retrolisthesis of C4 on C5. Facet alignment is intact. There is a large degree of degenerative pannus formation at the atlantoaxial joint. There are multilevel degenerative changes of the cervical spine including variable multilevel loss of vertebral body height and the intervertebral disc spaces, most pronounced at the C4-5 level. No acute fracture or dislocation. The spinal canal is patent and the intraspinous contents are grossly unremarkable. No prevertebral soft tissue swelling. The imaged portions of the lung apices are clear. Subcentimeter hyperattenuating nodule within the posterior aspect of the right lobe of the thyroid.  IMPRESSION:  CT Head: No acute intracranial hemorrhage, midline shift or mass effect. Chronic changes as above.  CT Cervical Spine: No acute fracture or dislocation. Chronic changes as above.  < end of copied text >    < from: CT Chest w/ IV Cont (02.12.21 @ 16:20) >/< from: CT Abdomen and Pelvis w/ IV Cont (02.12.21 @ 16:20) >  CHEST:  LUNGS AND LARGE AIRWAYS: Patent central airways. No pulmonary nodules.  PLEURA: Partial loculated small left and a moderate right-sided pleural effusions.  VESSELS: Atherosclerotic changes of the aorta.  HEART: Heart size is normal. No pericardial effusion. Coronary arterial calcifications/stents. Pacemaker leads in the right ventricle and right atrium.  MEDIASTINUM AND RAO: No lymphadenopathy.  CHEST WALL AND LOWER NECK: Left thyroid lobe subcentimeter hypodense nodule.  ABDOMEN AND PELVIS:  LIVER: Within normal limits.  BILE DUCTS: Normal caliber.  GALLBLADDER: Within normal limits.  SPLEEN: 1.3 x3.0 cm hypoattenuation in the minimal surrounding rim enhancement within the splenic parenchyma (12-40).  PANCREAS: Within normal limits.  ADRENALS: Within normal limits.  KIDNEYS/URETERS: Subcentimeter hypodensities, too small to characterize. No hydronephrosis or nephrolithiasis.  BLADDER: Contraction limits evaluation.  REPRODUCTIVE ORGANS: Hysterectomy.  BOWEL: No bowel obstruction.  PERITONEUM: No ascites.  VESSELS: Atherosclerotic changes. Questionable right distal femoral artery occlusion ()  RETROPERITONEUM/LYMPH NODES: No lymphadenopathy.  ABDOMINAL WALL: Within normal limits.  BONES: Degenerative changes. Right proximal femoral ORIF. Acute left comminuted intertrochanteric fracture.  IMPRESSION: Partially loculated small left and moderate right-sided pleural effusions. Acute left comminuted intertrochanteric fracture. Indeterminant splenic lesion. Filling defect in the right distal femoral artery, possibly occlusion or artifact.  < end of copied text >    < from: Xray Femur 1 View, Left (02.12.21 @ 15:48) >/< from: Xray Pelvis AP only (02.12.21 @ 15:46) >   IMPRESSION: Redemonstrated varus angulated proximal left femoral intertrochanteric fracture with lesser trochanteric avulsion. Intact and uncomplicated right femoral short IM aleks/nail with proximal compression screw and distal interlocking screw and with underlying anatomic alignment grossly maintained. No dislocations or additional fractures in remaining imaged regions. Intact pelvic and obturator rings. Preserved bilateral hip joint spaces. Bilateral knee osteoarthritis and bilateral hip pubic symphysis and knee chondrocalcinosis.  Generalized osteopenia otherwise no discrete lytic or blastic lesions. Vascular calcifications.  < end of copied text >    < from: Xray Femur 2 Views, Right (02.12.21 @ 15:47) >/< from: Xray Hip 2-3 Views, Right (02.12.21 @ 15:47) >  IMPRESSION: Redemonstrated varus angulated proximal left femoral intertrochanteric fracture with lesser trochanteric avulsion. Intact and uncomplicated right femoral short IM aleks/nail with proximal compression screw and distal interlocking screw and with underlying anatomic alignment grossly maintained. No dislocations or additional fractures in remaining imaged regions. Intact pelvic and obturator rings. Preserved bilateral hip joint spaces. Bilateral knee osteoarthritis and bilateral hip pubic symphysis and knee chondrocalcinosis.  Generalized osteopenia otherwise no discrete lytic or blastic lesions. Vascular calcifications.  < end of copied text >    < from: Xray Shoulder 2 Views, Right (02.12.21 @ 15:46) >  IMPRESSION: No fractures, dislocations, or AC separation. Moderate AC and moderate glenohumeral joint osteoarthrosis. Amorphous subacromial calcific deposits adjacent to right humeral head may be due in part to calcific tendinosis and/or chondrocalcinosis.  Generalized osteopenia otherwise no discrete lytic or blastic lesions. Partially visualized left-sided cardiac pacing wire leads.  < end of copied text >    < from: Xray Femur 2 Views, Left (02.12.21 @ 13:45) >< from: Xray Hip w/ Pelvis 2 or 3 Views, Left (02.12.21 @ 13:44) >  IMPRESSION: Acute slightly offset proximal left femoral intertrochanteric fracture with lesser trochanteric avulsion. Intact and uncomplicated appearing partially visualized upper end of a right femoral IM aleks/nail with proximal compression screw with underlying anatomic alignment maintained. Intact pelvic and obturator rings and symmetrically aligned and spaced SI joints and pubic symphysis. Advanced lower lumbar spine degenerative change. Mild bilateral hip osteoarthritis. Generalized osteopenia otherwise no discrete lytic or blastic lesions. Vascular calcifications. Notable marked rectal distention with stool.  < end of copied text >    < from: Xray Chest 1 View- PORTABLE-Urgent (Xray Chest 1 View- PORTABLE-Urgent .) (02.12.21 @ 13:44) >  The cardiac silhouette is normal in size. There is a left-sided dual-lead pacemaker. There are no focal consolidations or pleural effusions. The hilar and mediastinal structures appear unremarkable. The osseous structures are intact.  IMPRESSION: No evidence of acute pulmonary disease.  < end of copied text >    EKG:   Personally Reviewed:  [ ] YES     Imaging:   Personally Reviewed:  [x] YES               [x] Consultant(s) Notes Reviewed  [ ] Care Discussed with Consultants/Other Providers:    Advanced Directives: [ ] DNR  [ ] No feeding tube  [ ] MOLST in chart  [ ] MOLST completed today  [ ] Unknown    [ ] Probable osteoporosis  [ ] Possible osteomalacia  [ ] Increased delirium risk  [ ] Delirium and other risks can be reduced by:          -early ambulation          -minimizing "tethers" - IV, oxygen, catheters, etc          -avoiding hypnotics and sedatives          -maintaining hydration/nutrition          -avoid anticholinergics - diphenhydramine, etc          -pain control          -supportive environment   HPI:   89-year-old female, legally blind with CAD s/p stent placement, CHB S/P PPM, carotid stenosis s/p CEA, HTN, HLD, CLL, presenting with L hip pain s/p unwitnessed mechanical fall. Pt states she is unsure of when she fell, either yesterday or this morning. Patient lives with her sister and her niece but they didn't see her fall, niece assisted her after fall. Patient assigned her grand-nephew, Angelito, as her medical decision maker. Per family, patient fell on Sunday but was able to ambulate after, then they believe she fell this morning at 8AM and since has been unable to ambulate, and was complaining of pain to the left hip. Pt denies fevers/chills, LH/dizziness, head trauma or LOC, presyncope/syncope, numbness/tingling or any other orthopedic pain/injury. At baseline, pt ambulates with walker and assistance from home aide. Patient reports chronic intermittent chest pain but denies acute exacerbation.     In the ED VS: 98.5  63-85  118-172/41-76  16  % on 3-4L NC, received hydromorphone 0.5mg IV x1, ketorolac 15mg IV x2, lorazepam 0.5mg IV x1    History limited due to: [ ] Dementia  [ ] Delirium  [x] Condition    Pain Symptoms if applicable:             	                         none	   mild         moderate         severe  	                            0	    1-3	     4-6	         7-10  Pain: patient unable to qualify  Location: L hip	   Modifying factors: movement	  Associated symptoms: unable to state     Function: [ ] Independent  [ ] Assistance  [ ] Total care  [ ] Non-ambulatory    REVIEW OF SYSTEMS: ROS mostly per prior notes, on my exam, patient states "yes" or "what's that?" to most ROS    CONSTITUTIONAL: No fever, weight loss, or fatigue  EYES: Legally blind, only sees white per patient  ENMT:  No difficulty hearing, tinnitus, vertigo; No sinus or throat pain  NECK: R sided neck pain; No stiffness  RESPIRATORY: No cough, wheezing, chills or hemoptysis; No shortness of breath  CARDIOVASCULAR: chronic intermittent chest pain; No palpitations, dizziness, or leg swelling  GASTROINTESTINAL: (+) abdominal pain. No nausea, vomiting, or hematemesis; No diarrhea; (+) constipation. No melena or hematochezia.  GENITOURINARY: ? dysuria; Normal frequency, No hematuria  NEUROLOGICAL: (+) headaches, No numbness; decreased strength LLE secondary to pain  SKIN: No itching, burning, rashes, or lesions   LYMPH NODES: No enlarged glands  ENDOCRINE: complaining of feeling cold; No hair loss  MUSCULOSKELETAL: LLE pain  HEME/LYMPH: No easy bleeding/bruising    ALLERGIES:  morphine (Other)  oxycodone (Unknown)  Percocet 5/325 (Unknown)  pilocarpine (Other)    HOME MEDICATIONS: [x] Reviewed in EMR, patient unable to list    PAST MEDICAL HISTORY:   Legally blind  Complete heart block  CAD  HTN  Hyperlipemia  Pulmonary embolism  Cancer of Uterus  CLL (Chronic Lymphoblastic Leukemia)  Osteoarthritis  Cataract  Osteoporosis  Narrow-Angle Glaucoma    PAST SURGICAL HISTORY:  ORIF right femur fracture, 2012  H/O bilateral cataract extraction  MAXX-BSO  History of tonsillectomy  retina and glaucoma surgery left eye -here 12/11, 2/12 and 3/12  baerveldt implant left eye  History of Carotid Endarterectomy on L 2009  Coronary Stent 2006 x 3 LAD, Circ, RCA  Cardiac Pacemaker 2009  right cornea implant    FAMILY HISTORY: No known history of VTE/bleeding disorders     [x] Reviewed PMH/PSH/FHx    SOCIAL HISTORY:  Residence: [ ] [facility]  [ ] [facility]  [x] Community - lives with sister in house; grand-nephew is HCP  [ ] Substance abuse: None  [ ] Tobacco: None  [ ] Alcohol use: Rare    MEDICATIONS  (STANDING):  amLODIPine   Tablet 5 milliGRAM(s) Oral daily  chlorhexidine 2% Cloths 1 Application(s) Topical once  citalopram 20 milliGRAM(s) Oral daily  cyanocobalamin 1000 MICROGram(s) Oral daily  gabapentin 100 milliGRAM(s) Oral every 8 hours  isosorbide   mononitrate ER Tablet (IMDUR) 60 milliGRAM(s) Oral daily  melatonin 3 milliGRAM(s) Oral at bedtime  metoprolol succinate  milliGRAM(s) Oral daily  pantoprazole    Tablet 40 milliGRAM(s) Oral before breakfast  povidone iodine 5% Nasal Swab 1 Application(s) Both Nostrils once  ranolazine 500 milliGRAM(s) Oral daily  senna 2 Tablet(s) Oral at bedtime  simvastatin 20 milliGRAM(s) Oral at bedtime  sodium chloride 0.9%. 1000 milliLiter(s) (75 mL/Hr) IV Continuous <Continuous>    MEDICATIONS  (PRN):  ketorolac   Injectable 15 milliGRAM(s) IV Push every 6 hours PRN Severe Breakthrough Pain  magnesium hydroxide Suspension 30 milliLiter(s) Oral daily PRN Constipation  traMADol 25 milliGRAM(s) Oral every 4 hours PRN Moderate Pain (4 - 6)  traMADol 50 milliGRAM(s) Oral every 4 hours PRN Severe Pain (7 - 10)    PHYSICAL EXAM:   Vital Signs Last 24 Hrs  T(C): 36.6 (12 Feb 2021 22:32), Max: 36.9 (12 Feb 2021 10:27)  T(F): 97.8 (12 Feb 2021 22:32), Max: 98.5 (12 Feb 2021 10:27)  HR: 70 (12 Feb 2021 22:32) (63 - 85)  BP: 128/43 (12 Feb 2021 22:32) (118/41 - 172/76)  BP(mean): --  RR: 16 (12 Feb 2021 22:32) (16 - 16)  SpO2: 96% (12 Feb 2021 22:32) (96% - 100%)    GENERAL: mild distress secondary to pain, well-groomed, thin  HEAD:  Atraumatic, Normocephalic  EYES: opacified cornea b/l; conjunctiva and sclera clear  ENMT: Dry MM  NECK: Supple, No JVD  RESPIRATORY: Clear to auscultation bilaterally; No rales, rhonchi, wheezing, or rubs  CARDIOVASCULAR: Regular rate and rhythm; I/VI WINSOME; No rubs, or gallops  GASTROINTESTINAL: Soft, Nondistended; Bowel sounds present; Mildly tender to palpation diffusely, reports having to urinate  GENITOURINARY: Not examined  EXTREMITIES:  Unable to palpate DP/PT pulses b/l, (+)dopplerable b/l however; No clubbing, cyanosis, or edema  NERVOUS SYSTEM:  Alert & Oriented Xperson, place, month, reported year was 2020; Moving all 4 extremities; No gross sensory deficits  HEME/LYMPH: No lymphadenopathy noted  SKIN: No rashes or lesions; Incisions C/D/I; L medial ankle ecchymosis   MSK: LLE shorter, externally rotated     LABS:                        11.5   21.49 )-----------( 233      ( 12 Feb 2021 11:46 )             36.1     02-12    131<L>  |  96<L>  |  16  ----------------------------<  128<H>  4.5   |  25  |  0.54    Ca    8.3<L>      12 Feb 2021 11:46  Mg     1.8     02-12    TPro  6.1  /  Alb  3.6  /  TBili  0.8  /  DBili  x   /  AST  16  /  ALT  10  /  AlkPhos  53  02-12    PT/INR - ( 12 Feb 2021 11:46 )   PT: 13.2 sec;   INR: 1.17 ratio    PTT - ( 12 Feb 2021 11:46 )  PTT:29.5 sec    RADIOLOGY & ADDITIONAL STUDIES:  < from: CT Head No Cont (02.12.21 @ 16:20) >/< from: CT Cervical Spine No Cont (02.12.21 @ 16:20) >  HEAD: No acute intracranial hemorrhage. No midline shift, mass or mass effect. Ventricles and sulci are prominent, compatible with age-related generalized cerebral volume loss. No CT evidence for acute territorial infarct. Periventricular hypoattenuation is present, likely reflecting moderate chronic microvascular ischemic changes. The paranasal sinuses are clear. No significant mastoid effusion. The calvarium is intact. The left globe is moderately shrunken. There are bilateral ocular prostheses present.   CERVICAL SPINE: Grade 1 retrolisthesis of C4 on C5. Facet alignment is intact. There is a large degree of degenerative pannus formation at the atlantoaxial joint. There are multilevel degenerative changes of the cervical spine including variable multilevel loss of vertebral body height and the intervertebral disc spaces, most pronounced at the C4-5 level. No acute fracture or dislocation. The spinal canal is patent and the intraspinous contents are grossly unremarkable. No prevertebral soft tissue swelling. The imaged portions of the lung apices are clear. Subcentimeter hyperattenuating nodule within the posterior aspect of the right lobe of the thyroid.  IMPRESSION:  CT Head: No acute intracranial hemorrhage, midline shift or mass effect. Chronic changes as above.  CT Cervical Spine: No acute fracture or dislocation. Chronic changes as above.  < end of copied text >    < from: CT Chest w/ IV Cont (02.12.21 @ 16:20) >/< from: CT Abdomen and Pelvis w/ IV Cont (02.12.21 @ 16:20) >  CHEST:  LUNGS AND LARGE AIRWAYS: Patent central airways. No pulmonary nodules.  PLEURA: Partial loculated small left and a moderate right-sided pleural effusions.  VESSELS: Atherosclerotic changes of the aorta.  HEART: Heart size is normal. No pericardial effusion. Coronary arterial calcifications/stents. Pacemaker leads in the right ventricle and right atrium.  MEDIASTINUM AND RAO: No lymphadenopathy.  CHEST WALL AND LOWER NECK: Left thyroid lobe subcentimeter hypodense nodule.  ABDOMEN AND PELVIS:  LIVER: Within normal limits.  BILE DUCTS: Normal caliber.  GALLBLADDER: Within normal limits.  SPLEEN: 1.3 x3.0 cm hypoattenuation in the minimal surrounding rim enhancement within the splenic parenchyma (12-40).  PANCREAS: Within normal limits.  ADRENALS: Within normal limits.  KIDNEYS/URETERS: Subcentimeter hypodensities, too small to characterize. No hydronephrosis or nephrolithiasis.  BLADDER: Contraction limits evaluation.  REPRODUCTIVE ORGANS: Hysterectomy.  BOWEL: No bowel obstruction.  PERITONEUM: No ascites.  VESSELS: Atherosclerotic changes. Questionable right distal femoral artery occlusion ()  RETROPERITONEUM/LYMPH NODES: No lymphadenopathy.  ABDOMINAL WALL: Within normal limits.  BONES: Degenerative changes. Right proximal femoral ORIF. Acute left comminuted intertrochanteric fracture.  IMPRESSION: Partially loculated small left and moderate right-sided pleural effusions. Acute left comminuted intertrochanteric fracture. Indeterminant splenic lesion. Filling defect in the right distal femoral artery, possibly occlusion or artifact.  < end of copied text >    < from: Xray Femur 1 View, Left (02.12.21 @ 15:48) >/< from: Xray Pelvis AP only (02.12.21 @ 15:46) >   IMPRESSION: Redemonstrated varus angulated proximal left femoral intertrochanteric fracture with lesser trochanteric avulsion. Intact and uncomplicated right femoral short IM aleks/nail with proximal compression screw and distal interlocking screw and with underlying anatomic alignment grossly maintained. No dislocations or additional fractures in remaining imaged regions. Intact pelvic and obturator rings. Preserved bilateral hip joint spaces. Bilateral knee osteoarthritis and bilateral hip pubic symphysis and knee chondrocalcinosis.  Generalized osteopenia otherwise no discrete lytic or blastic lesions. Vascular calcifications.  < end of copied text >    < from: Xray Femur 2 Views, Right (02.12.21 @ 15:47) >/< from: Xray Hip 2-3 Views, Right (02.12.21 @ 15:47) >  IMPRESSION: Redemonstrated varus angulated proximal left femoral intertrochanteric fracture with lesser trochanteric avulsion. Intact and uncomplicated right femoral short IM aleks/nail with proximal compression screw and distal interlocking screw and with underlying anatomic alignment grossly maintained. No dislocations or additional fractures in remaining imaged regions. Intact pelvic and obturator rings. Preserved bilateral hip joint spaces. Bilateral knee osteoarthritis and bilateral hip pubic symphysis and knee chondrocalcinosis.  Generalized osteopenia otherwise no discrete lytic or blastic lesions. Vascular calcifications.  < end of copied text >    < from: Xray Shoulder 2 Views, Right (02.12.21 @ 15:46) >  IMPRESSION: No fractures, dislocations, or AC separation. Moderate AC and moderate glenohumeral joint osteoarthrosis. Amorphous subacromial calcific deposits adjacent to right humeral head may be due in part to calcific tendinosis and/or chondrocalcinosis.  Generalized osteopenia otherwise no discrete lytic or blastic lesions. Partially visualized left-sided cardiac pacing wire leads.  < end of copied text >    < from: Xray Femur 2 Views, Left (02.12.21 @ 13:45) >< from: Xray Hip w/ Pelvis 2 or 3 Views, Left (02.12.21 @ 13:44) >  IMPRESSION: Acute slightly offset proximal left femoral intertrochanteric fracture with lesser trochanteric avulsion. Intact and uncomplicated appearing partially visualized upper end of a right femoral IM aleks/nail with proximal compression screw with underlying anatomic alignment maintained. Intact pelvic and obturator rings and symmetrically aligned and spaced SI joints and pubic symphysis. Advanced lower lumbar spine degenerative change. Mild bilateral hip osteoarthritis. Generalized osteopenia otherwise no discrete lytic or blastic lesions. Vascular calcifications. Notable marked rectal distention with stool.  < end of copied text >    < from: Xray Chest 1 View- PORTABLE-Urgent (Xray Chest 1 View- PORTABLE-Urgent .) (02.12.21 @ 13:44) >  The cardiac silhouette is normal in size. There is a left-sided dual-lead pacemaker. There are no focal consolidations or pleural effusions. The hilar and mediastinal structures appear unremarkable. The osseous structures are intact.  IMPRESSION: No evidence of acute pulmonary disease.  < end of copied text >    EKG:   Personally Reviewed:  [x] YES - 81bpm V paced TWI V1; QTc 562ms     Imaging:   Reviewed:  [x] YES               [x] Consultant(s) Notes Reviewed  [x] Care Discussed with Consultants/Other Providers: Ortho    Advanced Directives: [x]HCP -nephAngelito gallegos    [x] Probable osteoporosis  [x] Increased delirium risk  [x] Delirium and other risks can be reduced by:          -early ambulation          -minimizing "tethers" - IV, oxygen, catheters, etc          -avoiding hypnotics and sedatives          -maintaining hydration/nutrition          -avoid anticholinergics - diphenhydramine, etc          -pain control          -supportive environment   HPI:   89-year-old female, legally blind with CAD s/p stent placement, CHB S/P PPM, carotid stenosis s/p CEA, HTN, HLD, CLL, presenting with L hip pain s/p unwitnessed mechanical fall. Pt states she is unsure of when she fell, either yesterday or this morning. Patient lives with her sister and her niece but they didn't see her fall, niece assisted her after fall. Patient assigned her grand-nephew, Angelito, as her medical decision maker. Per family, patient fell on Sunday but was able to ambulate after, then they believe she fell this morning at 8AM and since has been unable to ambulate, and was complaining of pain to the left hip. Pt denies fevers/chills, LH/dizziness, head trauma or LOC, presyncope/syncope, numbness/tingling or any other orthopedic pain/injury. At baseline, pt ambulates with walker and assistance from home aide. Patient reports chronic intermittent chest pain but denies acute exacerbation.     In the ED VS: 98.5  63-85  118-172/41-76  16  % on 3-4L NC, received hydromorphone 0.5mg IV x1, ketorolac 15mg IV x2, lorazepam 0.5mg IV x1    History limited due to: [ ] Dementia  [ ] Delirium  [x] Condition    Pain Symptoms if applicable:             	                         none	   mild         moderate         severe  	                            0	    1-3	     4-6	         7-10  Pain: patient unable to qualify  Location: L hip	   Modifying factors: movement	  Associated symptoms: unable to state     Function: [ ] Independent  [ ] Assistance  [ ] Total care  [ ] Non-ambulatory    REVIEW OF SYSTEMS: ROS mostly per prior notes, on my exam, patient states "yes" or "what's that?" to most ROS    CONSTITUTIONAL: No fever, weight loss, or fatigue  EYES: Legally blind, only sees white per patient  ENMT:  No difficulty hearing, tinnitus, vertigo; No sinus or throat pain  NECK: R sided neck pain; No stiffness  RESPIRATORY: No cough, wheezing, chills or hemoptysis; No shortness of breath  CARDIOVASCULAR: chronic intermittent chest pain; No palpitations, dizziness, or leg swelling  GASTROINTESTINAL: (+) abdominal pain. No nausea, vomiting, or hematemesis; No diarrhea; (+) constipation. No melena or hematochezia.  GENITOURINARY: ? dysuria; Normal frequency, No hematuria  NEUROLOGICAL: (+) headaches, No numbness; decreased strength LLE secondary to pain  SKIN: No itching, burning, rashes, or lesions   LYMPH NODES: No enlarged glands  ENDOCRINE: complaining of feeling cold; No hair loss  MUSCULOSKELETAL: LLE pain  HEME/LYMPH: No easy bleeding/bruising    ALLERGIES:  morphine (Other)  oxycodone (Unknown)  Percocet 5/325 (Unknown)  pilocarpine (Other)    HOME MEDICATIONS: [x] Reviewed in EMR, patient unable to list    PAST MEDICAL HISTORY:   Legally blind  Complete heart block  CAD  HTN  Hyperlipemia  Pulmonary embolism  Cancer of Uterus  CLL (Chronic Lymphoblastic Leukemia)  Osteoarthritis  Cataract  Osteoporosis  Narrow-Angle Glaucoma    PAST SURGICAL HISTORY:  ORIF right femur fracture, 2012  H/O bilateral cataract extraction  MAXX-BSO  History of tonsillectomy  retina and glaucoma surgery left eye -here 12/11, 2/12 and 3/12  baerveldt implant left eye  History of Carotid Endarterectomy on L 2009  Coronary Stent 2006 x 3 LAD, Circ, RCA  Cardiac Pacemaker 2009  right cornea implant    FAMILY HISTORY: No known history of VTE/bleeding disorders     [x] Reviewed PMH/PSH/FHx    SOCIAL HISTORY:  Residence: [ ] [facility]  [ ] [facility]  [x] Community - lives with sister in house; grand-nephew is HCP  [ ] Substance abuse: None  [ ] Tobacco: None  [ ] Alcohol use: Rare    MEDICATIONS  (STANDING):  amLODIPine   Tablet 5 milliGRAM(s) Oral daily  chlorhexidine 2% Cloths 1 Application(s) Topical once  citalopram 20 milliGRAM(s) Oral daily  cyanocobalamin 1000 MICROGram(s) Oral daily  gabapentin 100 milliGRAM(s) Oral every 8 hours  isosorbide   mononitrate ER Tablet (IMDUR) 60 milliGRAM(s) Oral daily  melatonin 3 milliGRAM(s) Oral at bedtime  metoprolol succinate  milliGRAM(s) Oral daily  pantoprazole    Tablet 40 milliGRAM(s) Oral before breakfast  povidone iodine 5% Nasal Swab 1 Application(s) Both Nostrils once  ranolazine 500 milliGRAM(s) Oral daily  senna 2 Tablet(s) Oral at bedtime  simvastatin 20 milliGRAM(s) Oral at bedtime  sodium chloride 0.9%. 1000 milliLiter(s) (75 mL/Hr) IV Continuous <Continuous>    MEDICATIONS  (PRN):  ketorolac   Injectable 15 milliGRAM(s) IV Push every 6 hours PRN Severe Breakthrough Pain  magnesium hydroxide Suspension 30 milliLiter(s) Oral daily PRN Constipation  traMADol 25 milliGRAM(s) Oral every 4 hours PRN Moderate Pain (4 - 6)  traMADol 50 milliGRAM(s) Oral every 4 hours PRN Severe Pain (7 - 10)    PHYSICAL EXAM:   Vital Signs Last 24 Hrs  T(C): 36.6 (12 Feb 2021 22:32), Max: 36.9 (12 Feb 2021 10:27)  T(F): 97.8 (12 Feb 2021 22:32), Max: 98.5 (12 Feb 2021 10:27)  HR: 70 (12 Feb 2021 22:32) (63 - 85)  BP: 128/43 (12 Feb 2021 22:32) (118/41 - 172/76)  BP(mean): --  RR: 16 (12 Feb 2021 22:32) (16 - 16)  SpO2: 96% (12 Feb 2021 22:32) (96% - 100%)    GENERAL: mild distress secondary to pain, well-groomed, thin  HEAD:  Atraumatic, Normocephalic  EYES: opacified cornea b/l; conjunctiva and sclera clear  ENMT: Dry MM  NECK: Supple, No JVD  RESPIRATORY: Clear to auscultation bilaterally; No rales, rhonchi, wheezing, or rubs  CARDIOVASCULAR: Regular rate and rhythm; I/VI WINSOME; No rubs, or gallops  GASTROINTESTINAL: Soft, Nondistended; Bowel sounds present; Mildly tender to palpation diffusely, reports having to urinate, suprapubic fullness on exam  GENITOURINARY: Not examined  EXTREMITIES:  Unable to palpate DP/PT pulses b/l, (+)dopplerable b/l however; No clubbing, cyanosis, or edema  NERVOUS SYSTEM:  Alert & Oriented Xperson, place, month, reported year was 2020; Moving all 4 extremities; No gross sensory deficits  HEME/LYMPH: No lymphadenopathy noted  SKIN: No rashes or lesions; Incisions C/D/I; L medial ankle ecchymosis   MSK: LLE shorter, externally rotated     LABS:                        11.5   21.49 )-----------( 233      ( 12 Feb 2021 11:46 )             36.1     02-12    131<L>  |  96<L>  |  16  ----------------------------<  128<H>  4.5   |  25  |  0.54    Ca    8.3<L>      12 Feb 2021 11:46  Mg     1.8     02-12    TPro  6.1  /  Alb  3.6  /  TBili  0.8  /  DBili  x   /  AST  16  /  ALT  10  /  AlkPhos  53  02-12    PT/INR - ( 12 Feb 2021 11:46 )   PT: 13.2 sec;   INR: 1.17 ratio    PTT - ( 12 Feb 2021 11:46 )  PTT:29.5 sec    Troponin T, High Sensitivity Result: 15 ng/L (02.12.21 @ 11:46)    RADIOLOGY & ADDITIONAL STUDIES:  < from: CT Head No Cont (02.12.21 @ 16:20) >/< from: CT Cervical Spine No Cont (02.12.21 @ 16:20) >  HEAD: No acute intracranial hemorrhage. No midline shift, mass or mass effect. Ventricles and sulci are prominent, compatible with age-related generalized cerebral volume loss. No CT evidence for acute territorial infarct. Periventricular hypoattenuation is present, likely reflecting moderate chronic microvascular ischemic changes. The paranasal sinuses are clear. No significant mastoid effusion. The calvarium is intact. The left globe is moderately shrunken. There are bilateral ocular prostheses present.   CERVICAL SPINE: Grade 1 retrolisthesis of C4 on C5. Facet alignment is intact. There is a large degree of degenerative pannus formation at the atlantoaxial joint. There are multilevel degenerative changes of the cervical spine including variable multilevel loss of vertebral body height and the intervertebral disc spaces, most pronounced at the C4-5 level. No acute fracture or dislocation. The spinal canal is patent and the intraspinous contents are grossly unremarkable. No prevertebral soft tissue swelling. The imaged portions of the lung apices are clear. Subcentimeter hyperattenuating nodule within the posterior aspect of the right lobe of the thyroid.  IMPRESSION:  CT Head: No acute intracranial hemorrhage, midline shift or mass effect. Chronic changes as above.  CT Cervical Spine: No acute fracture or dislocation. Chronic changes as above.  < end of copied text >    < from: CT Chest w/ IV Cont (02.12.21 @ 16:20) >/< from: CT Abdomen and Pelvis w/ IV Cont (02.12.21 @ 16:20) >  CHEST:  LUNGS AND LARGE AIRWAYS: Patent central airways. No pulmonary nodules.  PLEURA: Partial loculated small left and a moderate right-sided pleural effusions.  VESSELS: Atherosclerotic changes of the aorta.  HEART: Heart size is normal. No pericardial effusion. Coronary arterial calcifications/stents. Pacemaker leads in the right ventricle and right atrium.  MEDIASTINUM AND RAO: No lymphadenopathy.  CHEST WALL AND LOWER NECK: Left thyroid lobe subcentimeter hypodense nodule.  ABDOMEN AND PELVIS:  LIVER: Within normal limits.  BILE DUCTS: Normal caliber.  GALLBLADDER: Within normal limits.  SPLEEN: 1.3 x3.0 cm hypoattenuation in the minimal surrounding rim enhancement within the splenic parenchyma (12-40).  PANCREAS: Within normal limits.  ADRENALS: Within normal limits.  KIDNEYS/URETERS: Subcentimeter hypodensities, too small to characterize. No hydronephrosis or nephrolithiasis.  BLADDER: Contraction limits evaluation.  REPRODUCTIVE ORGANS: Hysterectomy.  BOWEL: No bowel obstruction.  PERITONEUM: No ascites.  VESSELS: Atherosclerotic changes. Questionable right distal femoral artery occlusion ()  RETROPERITONEUM/LYMPH NODES: No lymphadenopathy.  ABDOMINAL WALL: Within normal limits.  BONES: Degenerative changes. Right proximal femoral ORIF. Acute left comminuted intertrochanteric fracture.  IMPRESSION: Partially loculated small left and moderate right-sided pleural effusions. Acute left comminuted intertrochanteric fracture. Indeterminant splenic lesion. Filling defect in the right distal femoral artery, possibly occlusion or artifact.  < end of copied text >    < from: Xray Femur 1 View, Left (02.12.21 @ 15:48) >/< from: Xray Pelvis AP only (02.12.21 @ 15:46) >   IMPRESSION: Redemonstrated varus angulated proximal left femoral intertrochanteric fracture with lesser trochanteric avulsion. Intact and uncomplicated right femoral short IM aleks/nail with proximal compression screw and distal interlocking screw and with underlying anatomic alignment grossly maintained. No dislocations or additional fractures in remaining imaged regions. Intact pelvic and obturator rings. Preserved bilateral hip joint spaces. Bilateral knee osteoarthritis and bilateral hip pubic symphysis and knee chondrocalcinosis.  Generalized osteopenia otherwise no discrete lytic or blastic lesions. Vascular calcifications.  < end of copied text >    < from: Xray Femur 2 Views, Right (02.12.21 @ 15:47) >/< from: Xray Hip 2-3 Views, Right (02.12.21 @ 15:47) >  IMPRESSION: Redemonstrated varus angulated proximal left femoral intertrochanteric fracture with lesser trochanteric avulsion. Intact and uncomplicated right femoral short IM aleks/nail with proximal compression screw and distal interlocking screw and with underlying anatomic alignment grossly maintained. No dislocations or additional fractures in remaining imaged regions. Intact pelvic and obturator rings. Preserved bilateral hip joint spaces. Bilateral knee osteoarthritis and bilateral hip pubic symphysis and knee chondrocalcinosis.  Generalized osteopenia otherwise no discrete lytic or blastic lesions. Vascular calcifications.  < end of copied text >    < from: Xray Shoulder 2 Views, Right (02.12.21 @ 15:46) >  IMPRESSION: No fractures, dislocations, or AC separation. Moderate AC and moderate glenohumeral joint osteoarthrosis. Amorphous subacromial calcific deposits adjacent to right humeral head may be due in part to calcific tendinosis and/or chondrocalcinosis.  Generalized osteopenia otherwise no discrete lytic or blastic lesions. Partially visualized left-sided cardiac pacing wire leads.  < end of copied text >    < from: Xray Femur 2 Views, Left (02.12.21 @ 13:45) >< from: Xray Hip w/ Pelvis 2 or 3 Views, Left (02.12.21 @ 13:44) >  IMPRESSION: Acute slightly offset proximal left femoral intertrochanteric fracture with lesser trochanteric avulsion. Intact and uncomplicated appearing partially visualized upper end of a right femoral IM aleks/nail with proximal compression screw with underlying anatomic alignment maintained. Intact pelvic and obturator rings and symmetrically aligned and spaced SI joints and pubic symphysis. Advanced lower lumbar spine degenerative change. Mild bilateral hip osteoarthritis. Generalized osteopenia otherwise no discrete lytic or blastic lesions. Vascular calcifications. Notable marked rectal distention with stool.  < end of copied text >    < from: Xray Chest 1 View- PORTABLE-Urgent (Xray Chest 1 View- PORTABLE-Urgent .) (02.12.21 @ 13:44) >  The cardiac silhouette is normal in size. There is a left-sided dual-lead pacemaker. There are no focal consolidations or pleural effusions. The hilar and mediastinal structures appear unremarkable. The osseous structures are intact.  IMPRESSION: No evidence of acute pulmonary disease.  < end of copied text >    EKG:   Personally Reviewed:  [x] YES - 81bpm V paced TWI V1; QTc 562ms     Imaging:   Reviewed:  [x] YES               [x] Consultant(s) Notes Reviewed  [x] Care Discussed with Consultants/Other Providers: Ortho resident    Advanced Directives: [x]HCP grand-nephAngelito gallegos    [x] Probable osteoporosis  [x] Increased delirium risk  [x] Delirium and other risks can be reduced by:          -early ambulation          -minimizing "tethers" - IV, oxygen, catheters, etc          -avoiding hypnotics and sedatives          -maintaining hydration/nutrition          -avoid anticholinergics - diphenhydramine, etc          -pain control          -supportive environment   HPI:   89-year-old female, legally blind with CAD s/p stent placement, CHB S/P PPM, carotid stenosis s/p CEA, HTN, HLD, CLL, presenting with L hip pain s/p unwitnessed mechanical fall. Pt states she is unsure of when she fell, either yesterday or this morning. Patient lives with her sister and her niece but they didn't see her fall, niece assisted her after fall. Patient assigned her grand-nephew, Angelito, as her medical decision maker. Per family, patient fell on Sunday but was able to ambulate after, then they believe she fell this morning at 8AM and since has been unable to ambulate, and was complaining of pain to the left hip. Pt denies fevers/chills, LH/dizziness, head trauma or LOC, presyncope/syncope, numbness/tingling or any other orthopedic pain/injury. At baseline, pt ambulates with walker and assistance from home aide. Patient reports chronic intermittent chest pain but denies acute exacerbation.     In the ED VS: 98.5  63-85  118-172/41-76  16  % on 3-4L NC, received hydromorphone 0.5mg IV x1, ketorolac 15mg IV x2, lorazepam 0.5mg IV x1    History limited due to: [ ] Dementia  [ ] Delirium  [x] Condition    Pain Symptoms if applicable:             	                         none	   mild         moderate         severe  	                            0	    1-3	     4-6	         7-10  Pain: patient unable to qualify  Location: L hip	   Modifying factors: movement	  Associated symptoms: unable to state     Function: [ ] Independent  [ ] Assistance  [ ] Total care  [ ] Non-ambulatory    REVIEW OF SYSTEMS: ROS mostly per prior notes, on my exam, patient states "yes" or "what's that?" to most ROS    CONSTITUTIONAL: No fever, weight loss, or fatigue  EYES: Legally blind, only sees white per patient  ENMT:  No difficulty hearing, tinnitus, vertigo; No sinus or throat pain  NECK: R sided neck pain; No stiffness  RESPIRATORY: No cough, wheezing, chills or hemoptysis; No shortness of breath  CARDIOVASCULAR: chronic intermittent chest pain; No palpitations, dizziness, or leg swelling  GASTROINTESTINAL: (+) abdominal pain. No nausea, vomiting, or hematemesis; No diarrhea; (+) constipation. No melena or hematochezia.  GENITOURINARY: ? dysuria; Normal frequency, No hematuria  NEUROLOGICAL: (+) headaches, No numbness; decreased strength LLE secondary to pain  SKIN: No itching, burning, rashes, or lesions   LYMPH NODES: No enlarged glands  ENDOCRINE: complaining of feeling cold; No hair loss  MUSCULOSKELETAL: LLE pain  HEME/LYMPH: No easy bleeding/bruising    ALLERGIES:  morphine (Other)  oxycodone (Unknown)  Percocet 5/325 (Unknown)  pilocarpine (Other)    HOME MEDICATIONS: [x] Reviewed in EMR, patient unable to list    PAST MEDICAL HISTORY:   Legally blind  Complete heart block  CAD  HTN  Hyperlipemia  Pulmonary embolism  Cancer of Uterus  CLL (Chronic Lymphoblastic Leukemia)  Osteoarthritis  Cataract  Osteoporosis  Narrow-Angle Glaucoma    PAST SURGICAL HISTORY:  ORIF right femur fracture, 2012  H/O bilateral cataract extraction  MAXX-BSO  History of tonsillectomy  retina and glaucoma surgery left eye -here 12/11, 2/12 and 3/12  baerveldt implant left eye  History of Carotid Endarterectomy on L 2009  Coronary Stent 2006 x 3 LAD, Circ, RCA  Cardiac Pacemaker 2009  right cornea implant    FAMILY HISTORY: No known history of VTE/bleeding disorders     [x] Reviewed PMH/PSH/FHx    SOCIAL HISTORY:  Residence: [ ] [facility]  [ ] [facility]  [x] Community - lives with sister in house; grand-nephew is HCP  [ ] Substance abuse: None  [ ] Tobacco: None  [ ] Alcohol use: Rare    MEDICATIONS  (STANDING):  amLODIPine   Tablet 5 milliGRAM(s) Oral daily  chlorhexidine 2% Cloths 1 Application(s) Topical once  citalopram 20 milliGRAM(s) Oral daily  cyanocobalamin 1000 MICROGram(s) Oral daily  gabapentin 100 milliGRAM(s) Oral every 8 hours  isosorbide   mononitrate ER Tablet (IMDUR) 60 milliGRAM(s) Oral daily  melatonin 3 milliGRAM(s) Oral at bedtime  metoprolol succinate  milliGRAM(s) Oral daily  pantoprazole    Tablet 40 milliGRAM(s) Oral before breakfast  povidone iodine 5% Nasal Swab 1 Application(s) Both Nostrils once  ranolazine 500 milliGRAM(s) Oral daily  senna 2 Tablet(s) Oral at bedtime  simvastatin 20 milliGRAM(s) Oral at bedtime  sodium chloride 0.9%. 1000 milliLiter(s) (75 mL/Hr) IV Continuous <Continuous>    MEDICATIONS  (PRN):  ketorolac   Injectable 15 milliGRAM(s) IV Push every 6 hours PRN Severe Breakthrough Pain  magnesium hydroxide Suspension 30 milliLiter(s) Oral daily PRN Constipation  traMADol 25 milliGRAM(s) Oral every 4 hours PRN Moderate Pain (4 - 6)  traMADol 50 milliGRAM(s) Oral every 4 hours PRN Severe Pain (7 - 10)    PHYSICAL EXAM:   Vital Signs Last 24 Hrs  T(C): 36.6 (12 Feb 2021 22:32), Max: 36.9 (12 Feb 2021 10:27)  T(F): 97.8 (12 Feb 2021 22:32), Max: 98.5 (12 Feb 2021 10:27)  HR: 70 (12 Feb 2021 22:32) (63 - 85)  BP: 128/43 (12 Feb 2021 22:32) (118/41 - 172/76)  BP(mean): --  RR: 16 (12 Feb 2021 22:32) (16 - 16)  SpO2: 96% (12 Feb 2021 22:32) (96% - 100%)    GENERAL: mild distress secondary to pain, well-groomed, thin, pale  HEAD:  Atraumatic, Normocephalic  EYES: opacified cornea b/l; conjunctiva and sclera clear  ENMT: Dry MM  NECK: Supple, No JVD  RESPIRATORY: Clear to auscultation bilaterally; No rales, rhonchi, wheezing, or rubs  CARDIOVASCULAR: Regular rate and rhythm; I/VI WINSOME; No rubs, or gallops  GASTROINTESTINAL: Soft, Nondistended; Bowel sounds present; Mildly tender to palpation diffusely, reports having to urinate, suprapubic fullness on exam  GENITOURINARY: Not examined  EXTREMITIES:  Unable to palpate DP/PT pulses b/l, (+)dopplerable b/l however; No clubbing, cyanosis, or edema  NERVOUS SYSTEM:  Alert & Oriented Xperson, place, month, reported year was 2020; Moving all 4 extremities; No gross sensory deficits  HEME/LYMPH: No lymphadenopathy noted  SKIN: No rashes or lesions; Incisions C/D/I; L medial ankle ecchymosis   MSK: LLE shorter, externally rotated     LABS:                        11.5   21.49 )-----------( 233      ( 12 Feb 2021 11:46 )             36.1     02-12    131<L>  |  96<L>  |  16  ----------------------------<  128<H>  4.5   |  25  |  0.54    Ca    8.3<L>      12 Feb 2021 11:46  Mg     1.8     02-12    TPro  6.1  /  Alb  3.6  /  TBili  0.8  /  DBili  x   /  AST  16  /  ALT  10  /  AlkPhos  53  02-12    PT/INR - ( 12 Feb 2021 11:46 )   PT: 13.2 sec;   INR: 1.17 ratio    PTT - ( 12 Feb 2021 11:46 )  PTT:29.5 sec    Troponin T, High Sensitivity Result: 15 ng/L (02.12.21 @ 11:46)    RADIOLOGY & ADDITIONAL STUDIES:  < from: CT Head No Cont (02.12.21 @ 16:20) >/< from: CT Cervical Spine No Cont (02.12.21 @ 16:20) >  HEAD: No acute intracranial hemorrhage. No midline shift, mass or mass effect. Ventricles and sulci are prominent, compatible with age-related generalized cerebral volume loss. No CT evidence for acute territorial infarct. Periventricular hypoattenuation is present, likely reflecting moderate chronic microvascular ischemic changes. The paranasal sinuses are clear. No significant mastoid effusion. The calvarium is intact. The left globe is moderately shrunken. There are bilateral ocular prostheses present.   CERVICAL SPINE: Grade 1 retrolisthesis of C4 on C5. Facet alignment is intact. There is a large degree of degenerative pannus formation at the atlantoaxial joint. There are multilevel degenerative changes of the cervical spine including variable multilevel loss of vertebral body height and the intervertebral disc spaces, most pronounced at the C4-5 level. No acute fracture or dislocation. The spinal canal is patent and the intraspinous contents are grossly unremarkable. No prevertebral soft tissue swelling. The imaged portions of the lung apices are clear. Subcentimeter hyperattenuating nodule within the posterior aspect of the right lobe of the thyroid.  IMPRESSION:  CT Head: No acute intracranial hemorrhage, midline shift or mass effect. Chronic changes as above.  CT Cervical Spine: No acute fracture or dislocation. Chronic changes as above.  < end of copied text >    < from: CT Chest w/ IV Cont (02.12.21 @ 16:20) >/< from: CT Abdomen and Pelvis w/ IV Cont (02.12.21 @ 16:20) >  CHEST:  LUNGS AND LARGE AIRWAYS: Patent central airways. No pulmonary nodules.  PLEURA: Partial loculated small left and a moderate right-sided pleural effusions.  VESSELS: Atherosclerotic changes of the aorta.  HEART: Heart size is normal. No pericardial effusion. Coronary arterial calcifications/stents. Pacemaker leads in the right ventricle and right atrium.  MEDIASTINUM AND RAO: No lymphadenopathy.  CHEST WALL AND LOWER NECK: Left thyroid lobe subcentimeter hypodense nodule.  ABDOMEN AND PELVIS:  LIVER: Within normal limits.  BILE DUCTS: Normal caliber.  GALLBLADDER: Within normal limits.  SPLEEN: 1.3 x3.0 cm hypoattenuation in the minimal surrounding rim enhancement within the splenic parenchyma (12-40).  PANCREAS: Within normal limits.  ADRENALS: Within normal limits.  KIDNEYS/URETERS: Subcentimeter hypodensities, too small to characterize. No hydronephrosis or nephrolithiasis.  BLADDER: Contraction limits evaluation.  REPRODUCTIVE ORGANS: Hysterectomy.  BOWEL: No bowel obstruction.  PERITONEUM: No ascites.  VESSELS: Atherosclerotic changes. Questionable right distal femoral artery occlusion ()  RETROPERITONEUM/LYMPH NODES: No lymphadenopathy.  ABDOMINAL WALL: Within normal limits.  BONES: Degenerative changes. Right proximal femoral ORIF. Acute left comminuted intertrochanteric fracture.  IMPRESSION: Partially loculated small left and moderate right-sided pleural effusions. Acute left comminuted intertrochanteric fracture. Indeterminant splenic lesion. Filling defect in the right distal femoral artery, possibly occlusion or artifact.  < end of copied text >    < from: Xray Femur 1 View, Left (02.12.21 @ 15:48) >/< from: Xray Pelvis AP only (02.12.21 @ 15:46) >   IMPRESSION: Redemonstrated varus angulated proximal left femoral intertrochanteric fracture with lesser trochanteric avulsion. Intact and uncomplicated right femoral short IM aleks/nail with proximal compression screw and distal interlocking screw and with underlying anatomic alignment grossly maintained. No dislocations or additional fractures in remaining imaged regions. Intact pelvic and obturator rings. Preserved bilateral hip joint spaces. Bilateral knee osteoarthritis and bilateral hip pubic symphysis and knee chondrocalcinosis.  Generalized osteopenia otherwise no discrete lytic or blastic lesions. Vascular calcifications.  < end of copied text >    < from: Xray Femur 2 Views, Right (02.12.21 @ 15:47) >/< from: Xray Hip 2-3 Views, Right (02.12.21 @ 15:47) >  IMPRESSION: Redemonstrated varus angulated proximal left femoral intertrochanteric fracture with lesser trochanteric avulsion. Intact and uncomplicated right femoral short IM aleks/nail with proximal compression screw and distal interlocking screw and with underlying anatomic alignment grossly maintained. No dislocations or additional fractures in remaining imaged regions. Intact pelvic and obturator rings. Preserved bilateral hip joint spaces. Bilateral knee osteoarthritis and bilateral hip pubic symphysis and knee chondrocalcinosis.  Generalized osteopenia otherwise no discrete lytic or blastic lesions. Vascular calcifications.  < end of copied text >    < from: Xray Shoulder 2 Views, Right (02.12.21 @ 15:46) >  IMPRESSION: No fractures, dislocations, or AC separation. Moderate AC and moderate glenohumeral joint osteoarthrosis. Amorphous subacromial calcific deposits adjacent to right humeral head may be due in part to calcific tendinosis and/or chondrocalcinosis.  Generalized osteopenia otherwise no discrete lytic or blastic lesions. Partially visualized left-sided cardiac pacing wire leads.  < end of copied text >    < from: Xray Femur 2 Views, Left (02.12.21 @ 13:45) >< from: Xray Hip w/ Pelvis 2 or 3 Views, Left (02.12.21 @ 13:44) >  IMPRESSION: Acute slightly offset proximal left femoral intertrochanteric fracture with lesser trochanteric avulsion. Intact and uncomplicated appearing partially visualized upper end of a right femoral IM aleks/nail with proximal compression screw with underlying anatomic alignment maintained. Intact pelvic and obturator rings and symmetrically aligned and spaced SI joints and pubic symphysis. Advanced lower lumbar spine degenerative change. Mild bilateral hip osteoarthritis. Generalized osteopenia otherwise no discrete lytic or blastic lesions. Vascular calcifications. Notable marked rectal distention with stool.  < end of copied text >    < from: Xray Chest 1 View- PORTABLE-Urgent (Xray Chest 1 View- PORTABLE-Urgent .) (02.12.21 @ 13:44) >  The cardiac silhouette is normal in size. There is a left-sided dual-lead pacemaker. There are no focal consolidations or pleural effusions. The hilar and mediastinal structures appear unremarkable. The osseous structures are intact.  IMPRESSION: No evidence of acute pulmonary disease.  < end of copied text >    EKG:   Personally Reviewed:  [x] YES - 81bpm V paced TWI V1; QTc 562ms     Imaging:   Reviewed:  [x] YES               [x] Consultant(s) Notes Reviewed  [x] Care Discussed with Consultants/Other Providers: Ortho resident    Advanced Directives: [x]HCP grand-nephAngelito gallegos    [x] Probable osteoporosis  [x] Increased delirium risk  [x] Delirium and other risks can be reduced by:          -early ambulation          -minimizing "tethers" - IV, oxygen, catheters, etc          -avoiding hypnotics and sedatives          -maintaining hydration/nutrition          -avoid anticholinergics - diphenhydramine, etc          -pain control          -supportive environment

## 2021-02-12 NOTE — PROCEDURE NOTE - ADDITIONAL PROCEDURE DETAILS
Appropriate pacing and sensing. Capture threshold tested via iterative testing. No event scorresponding to this admission. No reprogramming done Appropriate pacing and sensing. Capture threshold tested via iterative testing. No events corresponding to this admission. No reprogramming done

## 2021-02-12 NOTE — CONSULT NOTE ADULT - SUBJECTIVE AND OBJECTIVE BOX
Patient seen and evaluated at bedside    Chief Complaint:    HPI:  89 y/o Female A&O x3, legally blind with PMH of CAD S/P stent placement, CHB S/P PPM placement, carotid stenosis s/p carotid endarterectomy, HTN, HLD, CLL, s/p intramedullary nailing of right/left femur Right hip in 2012 presents to [facility] with c/o L hip pain s/p unwitnessed mechanical fall. Pt states she is unsure of when she fell, either yesterday or this morning. States she lives with her sister and her niece but they didn't see her fall, niece assisted her after fall. Spoke to Angelito (grand-nephew) regarding great aunt since he makes all her medical decisions. States that she fell on Sunday but was able to ambulate after, then they believe she fell this morning at 8AM and since has been unable to ambulate, and was complaining of pain to the left hip. Pt denies fevers/chills, headstrike or LOC, numbness/tingling or any other orthopedic pain/injury. At baseline, pt ambulates with walker and assistance from home aide.    Cardiology consulted for preop clearance, pt denies presyncopal/syncopal episode, chest pain, lightheadedness of dizziness. Endorses history of chronic chest pain but denies acute exacerbation. Has PPM for hx of CHB. Hx of CAD w stents.       PMHx:   Legally blind  Complete heart block  Hyperlipemia  Hypertension  Pulmonary embolism  Angina pectoris, unstable  Cancer of Uterus  Knee Problem  Osteoarthritis  Cataract  Osteoporosis  Glaucoma, Narrow-Angle  Dyslipidemia  CLL (Chronic Lymphoblastic Yosef  Bradyarrhythmia  Coronary Artery Disease  HTN      PSHx:   Closed fracture of femur  H/O bilateral cataract extraction  History of total abdominal hysterectomy  History of tonsillectomy  Retina  baerveldt implant left eye  History of Carotid Endarterectomy  Unspecified Cataract  Coronary Stent  History of Arthroscopic Knee S  History of Hysterectomy  Cardiac Pacemaker  Dystrophy, Cornea  History of Tonsillectomy  Enlargement of Tonsils      Allergies:  morphine (Other)  oxycodone (Unknown)  Percocet 5/325 (Unknown)  pilocarpine (Other)    Current Medications:   amLODIPine   Tablet 5 milliGRAM(s) Oral daily  chlorhexidine 2% Cloths 1 Application(s) Topical once  citalopram 20 milliGRAM(s) Oral daily  cyanocobalamin 1000 MICROGram(s) Oral daily  gabapentin 100 milliGRAM(s) Oral every 8 hours  heparin   Injectable 5000 Unit(s) SubCutaneous Once  HYDROmorphone  Injectable 0.5 milliGRAM(s) IV Push Once  isosorbide   mononitrate ER Tablet (IMDUR) 60 milliGRAM(s) Oral daily  ketorolac   Injectable 15 milliGRAM(s) IV Push every 6 hours PRN  magnesium hydroxide Suspension 30 milliLiter(s) Oral daily PRN  melatonin 3 milliGRAM(s) Oral at bedtime  metoprolol succinate  milliGRAM(s) Oral daily  pantoprazole    Tablet 40 milliGRAM(s) Oral before breakfast  povidone iodine 5% Nasal Swab 1 Application(s) Both Nostrils once  ranolazine 500 milliGRAM(s) Oral daily  senna 2 Tablet(s) Oral at bedtime  simvastatin 20 milliGRAM(s) Oral at bedtime  sodium chloride 0.9%. 1000 milliLiter(s) IV Continuous <Continuous>  traMADol 25 milliGRAM(s) Oral every 4 hours PRN  traMADol 50 milliGRAM(s) Oral every 4 hours PRN      FAMILY HISTORY:  No pertinent family history in first degree relatives    Review of Systems:  All other review of systems is negative unless indicated above.    Physical Exam:  T(F): 98.3 (02-12), Max: 98.5 (02-12)  HR: 85 (02-12) (63 - 85)  BP: 127/59 (02-12) (118/41 - 172/76)  RR: 16 (02-12)  SpO2: 96% (02-12)    GENERAL: No acute distress, well-developed  HEAD:  Atraumatic, Normocephalic  ENT: EOMI, PERRLA, conjunctiva and sclera clear, Neck supple, No JVD, moist mucosa  CHEST/LUNG: Clear to auscultation bilaterally; No wheeze, equal breath sounds bilaterally   BACK: No spinal tenderness  HEART: Regular rate and rhythm; No murmurs, rubs, or gallops  ABDOMEN: Soft, Nontender, Nondistended; Bowel sounds present  EXTREMITIES:  No clubbing, cyanosis, or edema  PSYCH: Nl behavior, nl affect  NEUROLOGY: AAOx3, non-focal, cranial nerves intact  SKIN: Normal color, No rashes or lesions    Cardiovascular Diagnostic Testing:    ECG: No ECG in the chart     Echo:   < from: TTE with Doppler (w/Cont) (04.19.12 @ 12:12) >  ------------------------------------------------------------------------  CONCLUSIONS:  Technically limited and difficult study  1. Mitral annular calcification. Minimal mitral  regurgitation.  2. Aortic valve not well visualized.  3. Endocardium not well visualized; Endocardial  visualization enhanced with intravenous injection of echo  contrast (Definity). Despite the use of echo contrast, the  LV endocardial definition was still suboptimal. Unable to  accurately evaluate wall motion; The overall ejection  fraction appears preserved.  4. The right ventricle is not well visualized; grossly  normal right ventricular systolic function.  ------------------------------------------------------------------------  Confirmed on  4/19/2012 - 15:33:04 by Martina Mejía MD  ------------------------------------------------------------------------    < end of copied text >    Labs: Personally reviewed                        11.5   21.49 )-----------( 233      ( 12 Feb 2021 11:46 )             36.1     02-12    131<L>  |  96<L>  |  16  ----------------------------<  128<H>  4.5   |  25  |  0.54    Ca    8.3<L>      12 Feb 2021 11:46  Mg     1.8     02-12    TPro  6.1  /  Alb  3.6  /  TBili  0.8  /  DBili  x   /  AST  16  /  ALT  10  /  AlkPhos  53  02-12    PT/INR - ( 12 Feb 2021 11:46 )   PT: 13.2 sec;   INR: 1.17 ratio         PTT - ( 12 Feb 2021 11:46 )  PTT:29.5 sec                Imaging:    CXR: Personally reviewed    Labs: Personally reviewed                        11.5   21.49 )-----------( 233      ( 12 Feb 2021 11:46 )             36.1     02-12    131<L>  |  96<L>  |  16  ----------------------------<  128<H>  4.5   |  25  |  0.54    Ca    8.3<L>      12 Feb 2021 11:46  Mg     1.8     02-12    TPro  6.1  /  Alb  3.6  /  TBili  0.8  /  DBili  x   /  AST  16  /  ALT  10  /  AlkPhos  53  02-12    PT/INR - ( 12 Feb 2021 11:46 )   PT: 13.2 sec;   INR: 1.17 ratio         PTT - ( 12 Feb 2021 11:46 )  PTT:29.5 sec

## 2021-02-12 NOTE — ED PROVIDER NOTE - OBJECTIVE STATEMENT
Jennifer Melgoza MD: BIBEMS from home p/w fall. Pt states that she stood up to go to the bathroom when fell on her left side. Pt report left leg and hip pain, neck pain and back pain. Pt unsure if she hit her head  but reports a HA. Pt also unsure if she has LOC Jennifer Melgoza MD: 89 yo F PMHx of CAD S/P stent placement, CHB S/P PPM placement, carotid stenosis S/P CEA, HTN, HLD, CLL, legally blind BIBEMS from home p/w unwitnessed fall. Pt states that she stood up to go to the bathroom when fell on her left side. Pt report left leg and hip pain, neck pain and back pain. Pt unsure if she hit her head  but reports a HA. Pt also unsure if she has LOC but denies n/v.

## 2021-02-12 NOTE — ED PROVIDER NOTE - CLINICAL SUMMARY MEDICAL DECISION MAKING FREE TEXT BOX
Jennifer Melgoza MD: 89 yo F PMHx of CAD S/P stent placement, CHB S/P PPM placement, carotid stenosis S/P CEA, HTN, HLD, CLL, legally blind BIBEMS from home p/w unwitnessed fall. Pt states that she stood up to go to the bathroom when fell on her left side. will get cbc, cmp, trop, coags, CTH and c-spine, cxr, xray pelvis, xr femur. pain

## 2021-02-12 NOTE — CONSULT NOTE ADULT - NSPROBSELRECBLANK_9_GEN
2 RN skin assessment done; skin not WDL.   Surgical dressing/ Tariq wound vac to L knee, CDI   Skin remains intact otherwise    DISPLAY PLAN FREE TEXT

## 2021-02-13 DIAGNOSIS — D62 ACUTE POSTHEMORRHAGIC ANEMIA: ICD-10-CM

## 2021-02-13 LAB
ANION GAP SERPL CALC-SCNC: 12 MMOL/L — SIGNIFICANT CHANGE UP (ref 7–14)
ANION GAP SERPL CALC-SCNC: 8 MMOL/L — SIGNIFICANT CHANGE UP (ref 7–14)
APPEARANCE UR: CLEAR — SIGNIFICANT CHANGE UP
APTT BLD: 34.1 SEC — SIGNIFICANT CHANGE UP (ref 27–36.3)
BACTERIA # UR AUTO: ABNORMAL
BILIRUB UR-MCNC: NEGATIVE — SIGNIFICANT CHANGE UP
BLD GP AB SCN SERPL QL: NEGATIVE — SIGNIFICANT CHANGE UP
BUN SERPL-MCNC: 22 MG/DL — SIGNIFICANT CHANGE UP (ref 7–23)
BUN SERPL-MCNC: 22 MG/DL — SIGNIFICANT CHANGE UP (ref 7–23)
CALCIUM SERPL-MCNC: 7.4 MG/DL — LOW (ref 8.4–10.5)
CALCIUM SERPL-MCNC: 8 MG/DL — LOW (ref 8.4–10.5)
CHLORIDE SERPL-SCNC: 97 MMOL/L — LOW (ref 98–107)
CHLORIDE SERPL-SCNC: 99 MMOL/L — SIGNIFICANT CHANGE UP (ref 98–107)
CO2 SERPL-SCNC: 22 MMOL/L — SIGNIFICANT CHANGE UP (ref 22–31)
CO2 SERPL-SCNC: 23 MMOL/L — SIGNIFICANT CHANGE UP (ref 22–31)
COLOR SPEC: YELLOW — SIGNIFICANT CHANGE UP
CREAT SERPL-MCNC: 0.63 MG/DL — SIGNIFICANT CHANGE UP (ref 0.5–1.3)
CREAT SERPL-MCNC: 0.68 MG/DL — SIGNIFICANT CHANGE UP (ref 0.5–1.3)
DIFF PNL FLD: ABNORMAL
EPI CELLS # UR: 1 /HPF — SIGNIFICANT CHANGE UP (ref 0–5)
GLUCOSE SERPL-MCNC: 107 MG/DL — HIGH (ref 70–99)
GLUCOSE SERPL-MCNC: 110 MG/DL — HIGH (ref 70–99)
GLUCOSE UR QL: NEGATIVE — SIGNIFICANT CHANGE UP
HCT VFR BLD CALC: 27.1 % — LOW (ref 34.5–45)
HCT VFR BLD CALC: 31.9 % — LOW (ref 34.5–45)
HGB BLD-MCNC: 10 G/DL — LOW (ref 11.5–15.5)
HGB BLD-MCNC: 8.6 G/DL — LOW (ref 11.5–15.5)
HYALINE CASTS # UR AUTO: 2 /LPF — SIGNIFICANT CHANGE UP (ref 0–7)
INR BLD: 1.21 RATIO — HIGH (ref 0.88–1.16)
KETONES UR-MCNC: ABNORMAL
LEUKOCYTE ESTERASE UR-ACNC: NEGATIVE — SIGNIFICANT CHANGE UP
MCHC RBC-ENTMCNC: 29.3 PG — SIGNIFICANT CHANGE UP (ref 27–34)
MCHC RBC-ENTMCNC: 30 PG — SIGNIFICANT CHANGE UP (ref 27–34)
MCHC RBC-ENTMCNC: 31.3 GM/DL — LOW (ref 32–36)
MCHC RBC-ENTMCNC: 31.7 GM/DL — LOW (ref 32–36)
MCV RBC AUTO: 93.5 FL — SIGNIFICANT CHANGE UP (ref 80–100)
MCV RBC AUTO: 94.4 FL — SIGNIFICANT CHANGE UP (ref 80–100)
NITRITE UR-MCNC: NEGATIVE — SIGNIFICANT CHANGE UP
NRBC # BLD: 0 /100 WBCS — SIGNIFICANT CHANGE UP
NRBC # BLD: 0 /100 WBCS — SIGNIFICANT CHANGE UP
NRBC # FLD: 0 K/UL — SIGNIFICANT CHANGE UP
NRBC # FLD: 0.03 K/UL — HIGH
PH UR: 6.5 — SIGNIFICANT CHANGE UP (ref 5–8)
PLATELET # BLD AUTO: 202 K/UL — SIGNIFICANT CHANGE UP (ref 150–400)
PLATELET # BLD AUTO: 218 K/UL — SIGNIFICANT CHANGE UP (ref 150–400)
POTASSIUM SERPL-MCNC: 4.1 MMOL/L — SIGNIFICANT CHANGE UP (ref 3.5–5.3)
POTASSIUM SERPL-MCNC: 4.2 MMOL/L — SIGNIFICANT CHANGE UP (ref 3.5–5.3)
POTASSIUM SERPL-SCNC: 4.1 MMOL/L — SIGNIFICANT CHANGE UP (ref 3.5–5.3)
POTASSIUM SERPL-SCNC: 4.2 MMOL/L — SIGNIFICANT CHANGE UP (ref 3.5–5.3)
PROT UR-MCNC: ABNORMAL
PROTHROM AB SERPL-ACNC: 13.7 SEC — HIGH (ref 10.6–13.6)
RBC # BLD: 2.87 M/UL — LOW (ref 3.8–5.2)
RBC # BLD: 3.41 M/UL — LOW (ref 3.8–5.2)
RBC # FLD: 13.8 % — SIGNIFICANT CHANGE UP (ref 10.3–14.5)
RBC # FLD: 15.3 % — HIGH (ref 10.3–14.5)
RBC CASTS # UR COMP ASSIST: 19 /HPF — HIGH (ref 0–4)
RH IG SCN BLD-IMP: POSITIVE — SIGNIFICANT CHANGE UP
SARS-COV-2 RNA SPEC QL NAA+PROBE: SIGNIFICANT CHANGE UP
SODIUM SERPL-SCNC: 130 MMOL/L — LOW (ref 135–145)
SODIUM SERPL-SCNC: 131 MMOL/L — LOW (ref 135–145)
SP GR SPEC: >1.05 (ref 1.01–1.02)
UROBILINOGEN FLD QL: SIGNIFICANT CHANGE UP
WBC # BLD: 17.92 K/UL — HIGH (ref 3.8–10.5)
WBC # BLD: 26.43 K/UL — HIGH (ref 3.8–10.5)
WBC # FLD AUTO: 17.92 K/UL — HIGH (ref 3.8–10.5)
WBC # FLD AUTO: 26.43 K/UL — HIGH (ref 3.8–10.5)
WBC UR QL: 28 /HPF — HIGH (ref 0–5)

## 2021-02-13 PROCEDURE — 99233 SBSQ HOSP IP/OBS HIGH 50: CPT

## 2021-02-13 PROCEDURE — 27245 TREAT THIGH FRACTURE: CPT | Mod: LT

## 2021-02-13 RX ORDER — PANTOPRAZOLE SODIUM 20 MG/1
40 TABLET, DELAYED RELEASE ORAL
Refills: 0 | Status: DISCONTINUED | OUTPATIENT
Start: 2021-02-13 | End: 2021-02-24

## 2021-02-13 RX ORDER — CITALOPRAM 10 MG/1
20 TABLET, FILM COATED ORAL DAILY
Refills: 0 | Status: DISCONTINUED | OUTPATIENT
Start: 2021-02-13 | End: 2021-02-24

## 2021-02-13 RX ORDER — ONDANSETRON 8 MG/1
4 TABLET, FILM COATED ORAL ONCE
Refills: 0 | Status: COMPLETED | OUTPATIENT
Start: 2021-02-13 | End: 2021-02-22

## 2021-02-13 RX ORDER — AMLODIPINE BESYLATE 2.5 MG/1
5 TABLET ORAL DAILY
Refills: 0 | Status: DISCONTINUED | OUTPATIENT
Start: 2021-02-13 | End: 2021-02-14

## 2021-02-13 RX ORDER — ASPIRIN/CALCIUM CARB/MAGNESIUM 324 MG
81 TABLET ORAL DAILY
Refills: 0 | Status: DISCONTINUED | OUTPATIENT
Start: 2021-02-14 | End: 2021-02-24

## 2021-02-13 RX ORDER — SODIUM CHLORIDE 9 MG/ML
1000 INJECTION INTRAMUSCULAR; INTRAVENOUS; SUBCUTANEOUS
Refills: 0 | Status: DISCONTINUED | OUTPATIENT
Start: 2021-02-13 | End: 2021-02-14

## 2021-02-13 RX ORDER — FENTANYL CITRATE 50 UG/ML
25 INJECTION INTRAVENOUS
Refills: 0 | Status: DISCONTINUED | OUTPATIENT
Start: 2021-02-13 | End: 2021-02-13

## 2021-02-13 RX ORDER — TRAMADOL HYDROCHLORIDE 50 MG/1
25 TABLET ORAL EVERY 4 HOURS
Refills: 0 | Status: DISCONTINUED | OUTPATIENT
Start: 2021-02-13 | End: 2021-02-14

## 2021-02-13 RX ORDER — TRAMADOL HYDROCHLORIDE 50 MG/1
50 TABLET ORAL EVERY 4 HOURS
Refills: 0 | Status: DISCONTINUED | OUTPATIENT
Start: 2021-02-13 | End: 2021-02-20

## 2021-02-13 RX ORDER — METOPROLOL TARTRATE 50 MG
100 TABLET ORAL DAILY
Refills: 0 | Status: DISCONTINUED | OUTPATIENT
Start: 2021-02-13 | End: 2021-02-24

## 2021-02-13 RX ORDER — PREGABALIN 225 MG/1
1000 CAPSULE ORAL DAILY
Refills: 0 | Status: DISCONTINUED | OUTPATIENT
Start: 2021-02-13 | End: 2021-02-24

## 2021-02-13 RX ORDER — GABAPENTIN 400 MG/1
100 CAPSULE ORAL EVERY 8 HOURS
Refills: 0 | Status: DISCONTINUED | OUTPATIENT
Start: 2021-02-13 | End: 2021-02-17

## 2021-02-13 RX ORDER — LANOLIN ALCOHOL/MO/W.PET/CERES
3 CREAM (GRAM) TOPICAL AT BEDTIME
Refills: 0 | Status: DISCONTINUED | OUTPATIENT
Start: 2021-02-13 | End: 2021-02-24

## 2021-02-13 RX ORDER — SIMVASTATIN 20 MG/1
20 TABLET, FILM COATED ORAL AT BEDTIME
Refills: 0 | Status: DISCONTINUED | OUTPATIENT
Start: 2021-02-13 | End: 2021-02-24

## 2021-02-13 RX ORDER — MAGNESIUM HYDROXIDE 400 MG/1
30 TABLET, CHEWABLE ORAL DAILY
Refills: 0 | Status: DISCONTINUED | OUTPATIENT
Start: 2021-02-13 | End: 2021-02-24

## 2021-02-13 RX ORDER — ACETAMINOPHEN 500 MG
975 TABLET ORAL EVERY 8 HOURS
Refills: 0 | Status: DISCONTINUED | OUTPATIENT
Start: 2021-02-13 | End: 2021-02-19

## 2021-02-13 RX ORDER — ISOSORBIDE MONONITRATE 60 MG/1
60 TABLET, EXTENDED RELEASE ORAL DAILY
Refills: 0 | Status: DISCONTINUED | OUTPATIENT
Start: 2021-02-13 | End: 2021-02-14

## 2021-02-13 RX ORDER — CEFAZOLIN SODIUM 1 G
2000 VIAL (EA) INJECTION EVERY 8 HOURS
Refills: 0 | Status: COMPLETED | OUTPATIENT
Start: 2021-02-13 | End: 2021-02-14

## 2021-02-13 RX ORDER — CLOPIDOGREL BISULFATE 75 MG/1
75 TABLET, FILM COATED ORAL DAILY
Refills: 0 | Status: DISCONTINUED | OUTPATIENT
Start: 2021-02-14 | End: 2021-02-24

## 2021-02-13 RX ORDER — ACETAMINOPHEN 500 MG
1000 TABLET ORAL ONCE
Refills: 0 | Status: COMPLETED | OUTPATIENT
Start: 2021-02-13 | End: 2021-02-13

## 2021-02-13 RX ORDER — RANOLAZINE 500 MG/1
500 TABLET, FILM COATED, EXTENDED RELEASE ORAL DAILY
Refills: 0 | Status: DISCONTINUED | OUTPATIENT
Start: 2021-02-13 | End: 2021-02-24

## 2021-02-13 RX ORDER — SENNA PLUS 8.6 MG/1
2 TABLET ORAL AT BEDTIME
Refills: 0 | Status: DISCONTINUED | OUTPATIENT
Start: 2021-02-13 | End: 2021-02-24

## 2021-02-13 RX ADMIN — Medication 400 MILLIGRAM(S): at 02:22

## 2021-02-13 RX ADMIN — SIMVASTATIN 20 MILLIGRAM(S): 20 TABLET, FILM COATED ORAL at 23:15

## 2021-02-13 RX ADMIN — CHLORHEXIDINE GLUCONATE 1 APPLICATION(S): 213 SOLUTION TOPICAL at 13:45

## 2021-02-13 RX ADMIN — Medication 100 MILLIGRAM(S): at 23:16

## 2021-02-13 RX ADMIN — SODIUM CHLORIDE 100 MILLILITER(S): 9 INJECTION INTRAMUSCULAR; INTRAVENOUS; SUBCUTANEOUS at 18:15

## 2021-02-13 RX ADMIN — GABAPENTIN 100 MILLIGRAM(S): 400 CAPSULE ORAL at 22:04

## 2021-02-13 RX ADMIN — SODIUM CHLORIDE 75 MILLILITER(S): 9 INJECTION INTRAMUSCULAR; INTRAVENOUS; SUBCUTANEOUS at 11:11

## 2021-02-13 RX ADMIN — TRAMADOL HYDROCHLORIDE 50 MILLIGRAM(S): 50 TABLET ORAL at 13:21

## 2021-02-13 RX ADMIN — Medication 975 MILLIGRAM(S): at 23:15

## 2021-02-13 RX ADMIN — Medication 1 APPLICATION(S): at 13:45

## 2021-02-13 RX ADMIN — PANTOPRAZOLE SODIUM 40 MILLIGRAM(S): 20 TABLET, DELAYED RELEASE ORAL at 05:30

## 2021-02-13 RX ADMIN — GABAPENTIN 100 MILLIGRAM(S): 400 CAPSULE ORAL at 05:30

## 2021-02-13 NOTE — PROGRESS NOTE ADULT - SUBJECTIVE AND OBJECTIVE BOX
Pt seen/examined. Pain controlled. Receiving 1 unit of PRBC this morning. NPO for surgery today    T(C): 36.4 (02-13-21 @ 07:35), Max: 36.9 (02-12-21 @ 10:27)  HR: 84 (02-13-21 @ 07:35) (63 - 89)  BP: 102/46 (02-13-21 @ 07:35) (100/45 - 172/76)  RR: 18 (02-13-21 @ 07:35) (16 - 18)  SpO2: 99% (02-13-21 @ 07:35) (96% - 100%)  Wt(kg): --                          8.6    17.92 )-----------( 202      ( 13 Feb 2021 05:42 )             27.1                         11.5   21.49 )-----------( 233      ( 12 Feb 2021 11:46 )             36.1       02-13    130<L>  |  99  |  22  ----------------------------<  107<H>  4.2   |  23  |  0.68        PT/INR - ( 13 Feb 2021 05:42 )   PT: 13.7 sec;   INR: 1.21 ratio         PTT - ( 13 Feb 2021 05:42 )  PTT:34.1 sec    Gen: awake, alert, NAD  Resp: no increased work of breathing  LLE:  skin intact  externally rotated leg  limited hip ROM do to pain   +EHL/FHL/TA/GS  SILT S/S/SP/DP  +DP/PT Pulses  Compartments soft  No calf TTP   positive log roll    89yFemale s/p left IT fracture. NPO for OR today.    - Pain control  - mechanical/DVT ppx  - NWB LLE  - Medicine and cardiology consent documented  - consent in chart

## 2021-02-13 NOTE — PROGRESS NOTE ADULT - PROBLEM SELECTOR PLAN 1
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 cleared by cardiology, note appreciated, pt has PPM for complete heart block  OR today per ortho  RCRI 1 (CAD)  -of note, large degree of degenerative pannus formation at the atlantoaxial joint noted on imaging - make anesthesia aware.

## 2021-02-13 NOTE — PROGRESS NOTE ADULT - PROBLEM SELECTOR PLAN 4
per PMH, patient not aware of this Dx  DVT ppx per ortho. -transfused 1 unit prbc on 2/13 preop for hgb 8.6  -trend CBC and transfuse prn  -plavix on hold, resume when cleared by ortho

## 2021-02-13 NOTE — PROGRESS NOTE ADULT - PROBLEM SELECTOR PLAN 3
c/w home medications (Toprol, imdur, norvasc, ranexa)  , patient was unable to verify home meds on my exam  patient complaining of chronic intermittent chest pain, unchanged from earlier, per cards no further w/u required. -h/o CAD/stent  -resume plavix 75 mg qd postop when cleared by ortho  -c/w home medications (Toprol, imdur, norvasc, ranexa)  , patient was unable to verify home meds on my exam  patient complaining of chronic intermittent chest pain, unchanged from earlier, per cards no further w/u required.

## 2021-02-13 NOTE — PRE-OP CHECKLIST - 5.
hibiclens wipes and betadine nasal swab in holding Hibiclens wipes and betadine nasal swab in holding, fall risk

## 2021-02-13 NOTE — PROGRESS NOTE ADULT - PROBLEM SELECTOR PLAN 8
possible R distal femoral arterial occlusion noted on imaging (vs. artifact) - given unable to palpate DP/PT pulses on exam (however (+) bilat doppler signals), would check Arterial dopplers for further eval  #Thyroid nodule - check TSH  #pleural effusion on CT - R>L, unclear etiology, check pro-BNP in AM, monitor I/Os, on O2 via NC, 1lpm on my exam, COVID PCR neg (2/12)  #splenic lesion - unknown chronicity, new since 2012 imaging, would discuss with patient when more alert, likely can pursue further w/u as outpatient if desired.

## 2021-02-13 NOTE — PROGRESS NOTE ADULT - SUBJECTIVE AND OBJECTIVE BOX
Dr. Jeannette Roper  Pager [phone]    PROGRESS NOTE:     Patient is a 89y old  Female who presents with a chief complaint of left intertrochanteric fracture (13 Feb 2021 08:46)      SUBJECTIVE / OVERNIGHT EVENTS: pt denies sob, has occasional chronic chest pain,  c/o left hip pain  ADDITIONAL REVIEW OF SYSTEMS: afebrile     MEDICATIONS  (STANDING):  amLODIPine   Tablet 5 milliGRAM(s) Oral daily  chlorhexidine 2% Cloths 1 Application(s) Topical once  citalopram 20 milliGRAM(s) Oral daily  cyanocobalamin 1000 MICROGram(s) Oral daily  gabapentin 100 milliGRAM(s) Oral every 8 hours  isosorbide   mononitrate ER Tablet (IMDUR) 60 milliGRAM(s) Oral daily  melatonin 3 milliGRAM(s) Oral at bedtime  metoprolol succinate  milliGRAM(s) Oral daily  pantoprazole    Tablet 40 milliGRAM(s) Oral before breakfast  povidone iodine 5% Nasal Swab 1 Application(s) Both Nostrils once  ranolazine 500 milliGRAM(s) Oral daily  senna 2 Tablet(s) Oral at bedtime  simvastatin 20 milliGRAM(s) Oral at bedtime  sodium chloride 0.9%. 1000 milliLiter(s) (75 mL/Hr) IV Continuous <Continuous>    MEDICATIONS  (PRN):  ketorolac   Injectable 15 milliGRAM(s) IV Push every 6 hours PRN Severe Breakthrough Pain  magnesium hydroxide Suspension 30 milliLiter(s) Oral daily PRN Constipation  traMADol 25 milliGRAM(s) Oral every 4 hours PRN Moderate Pain (4 - 6)  traMADol 50 milliGRAM(s) Oral every 4 hours PRN Severe Pain (7 - 10)      CAPILLARY BLOOD GLUCOSE        I&O's Summary      PHYSICAL EXAM:  Vital Signs Last 24 Hrs  T(C): 36.7 (13 Feb 2021 13:15), Max: 36.8 (12 Feb 2021 14:00)  T(F): 98.1 (13 Feb 2021 13:15), Max: 98.3 (12 Feb 2021 14:00)  HR: 82 (13 Feb 2021 13:15) (65 - 89)  BP: 151/55 (13 Feb 2021 13:15) (100/45 - 151/55)  BP(mean): --  RR: 18 (13 Feb 2021 13:15) (16 - 18)  SpO2: 98% (13 Feb 2021 13:15) (95% - 99%)  CONSTITUTIONAL: NAD, thin, frail, elderly female  Heent: legally blind b/l  RESPIRATORY: Normal respiratory effort; decreased BS R lung base  CARDIOVASCULAR: Regular rate and rhythm, normal S1 and S2, no murmur/rub/gallop; No lower extremity edema; Peripheral pulses are 2+ bilaterally  ABDOMEN: Nontender to palpation, normoactive bowel sounds, no rebound/guarding; No hepatosplenomegaly  MUSCULOSKELETAL: LLE shortened and externally rotated  PSYCH: A+O to person, place, and time; affect appropriate    LABS:                        8.6    17.92 )-----------( 202      ( 13 Feb 2021 05:42 )             27.1     02-13    130<L>  |  99  |  22  ----------------------------<  107<H>  4.2   |  23  |  0.68    Ca    8.0<L>      13 Feb 2021 05:42  Mg     1.8     02-12    TPro  6.1  /  Alb  3.6  /  TBili  0.8  /  DBili  x   /  AST  16  /  ALT  10  /  AlkPhos  53  02-12    PT/INR - ( 13 Feb 2021 05:42 )   PT: 13.7 sec;   INR: 1.21 ratio         PTT - ( 13 Feb 2021 05:42 )  PTT:34.1 sec      Urinalysis Basic - ( 13 Feb 2021 01:37 )    Color: Yellow / Appearance: Clear / SG: >1.050 / pH: x  Gluc: x / Ketone: Small  / Bili: Negative / Urobili: <2 mg/dL   Blood: x / Protein: 30 mg/dL / Nitrite: Negative   Leuk Esterase: Negative / RBC: 19 /HPF / WBC 28 /HPF   Sq Epi: x / Non Sq Epi: 1 /HPF / Bacteria: Few    RADIOLOGY & ADDITIONAL TESTS:  Results Reviewed:   Imaging Personally Reviewed:  < from: CT Head No Cont (02.12.21 @ 16:20) >  IMPRESSION:  CT Head: No acute intracranial hemorrhage, midline shift or mass effect. Chronic changes as above.    CT Cervical Spine: No acute fracture or dislocation. Chronic changes as above.    < from: CT Abdomen and Pelvis w/ IV Cont (02.12.21 @ 16:20) >  IMPRESSION:    Partially loculated small left and moderate right-sided pleural effusions.    Acute left comminuted intertrochanteric fracture.    Indeterminant splenic lesion.    Filling defect in the right distal femoral artery, possibly occlusion or artifact.      Electrocardiogram Personally Reviewed:    COORDINATION OF CARE:  Care Discussed with Consultants/Other Providers [Y/N]:  Prior or Outpatient Records Reviewed [Y/N]:

## 2021-02-13 NOTE — PROGRESS NOTE ADULT - SUBJECTIVE AND OBJECTIVE BOX
ORTHO POST OP CHECK    S: Pt seen and examined. Doing well postoperatively. Pain well controlled. Denies N/V/CP/SOB.       O:   PE:  Gen: NAD, laying comfortably in bed  Resp: Unlabored breathing  MSK: Dressing c/d/i          +EHL/FHL/TA/Gas/SOl          +DP/SP/Brian/Saph           2+DP                          10.0   26.43 )-----------( 218      ( 13 Feb 2021 18:12 )             31.9     02-13    131<L>  |  97<L>  |  22  ----------------------------<  110<H>  4.1   |  22  |  0.63    Ca    7.4<L>      13 Feb 2021 18:12  Mg     1.8     02-12    TPro  6.1  /  Alb  3.6  /  TBili  0.8  /  DBili  x   /  AST  16  /  ALT  10  /  AlkPhos  53  02-12      Vital Signs Last 24 Hrs  T(C): 37 (13 Feb 2021 21:30), Max: 37.1 (13 Feb 2021 13:37)  T(F): 98.6 (13 Feb 2021 21:30), Max: 98.8 (13 Feb 2021 13:37)  HR: 75 (13 Feb 2021 21:30) (65 - 95)  BP: 121/60 (13 Feb 2021 21:30) (90/55 - 151/55)  BP(mean): 73 (13 Feb 2021 21:30) (60 - 73)  RR: 16 (13 Feb 2021 21:30) (12 - 19)  SpO2: 95% (13 Feb 2021 21:30) (86% - 100%)  I&O's Summary    13 Feb 2021 07:01  -  13 Feb 2021 21:52  --------------------------------------------------------  IN: 475 mL / OUT: 0 mL / NET: 475 mL        A/P: 89F s/p L hip IMN    -Neuro: Multimodal pain control  -Resp: IS  -GI: reg diet  -MSK: OOB, WBAT, PT/OT  -Heme: DVT PPx: A81, plavix     Ortho

## 2021-02-13 NOTE — PROGRESS NOTE ADULT - PROBLEM SELECTOR PLAN 5
c/w home meds with hold parameters given soft BP s/p pain meds. per PMH, patient not aware of this Dx  DVT ppx per ortho.

## 2021-02-14 LAB
ANION GAP SERPL CALC-SCNC: 16 MMOL/L — HIGH (ref 7–14)
BUN SERPL-MCNC: 25 MG/DL — HIGH (ref 7–23)
CALCIUM SERPL-MCNC: 8.2 MG/DL — LOW (ref 8.4–10.5)
CHLORIDE SERPL-SCNC: 100 MMOL/L — SIGNIFICANT CHANGE UP (ref 98–107)
CO2 SERPL-SCNC: 16 MMOL/L — LOW (ref 22–31)
CREAT SERPL-MCNC: 0.61 MG/DL — SIGNIFICANT CHANGE UP (ref 0.5–1.3)
GLUCOSE SERPL-MCNC: 110 MG/DL — HIGH (ref 70–99)
HCT VFR BLD CALC: 32.1 % — LOW (ref 34.5–45)
HGB BLD-MCNC: 9.7 G/DL — LOW (ref 11.5–15.5)
MCHC RBC-ENTMCNC: 29.4 PG — SIGNIFICANT CHANGE UP (ref 27–34)
MCHC RBC-ENTMCNC: 30.2 GM/DL — LOW (ref 32–36)
MCV RBC AUTO: 97.3 FL — SIGNIFICANT CHANGE UP (ref 80–100)
NRBC # BLD: 0 /100 WBCS — SIGNIFICANT CHANGE UP
NRBC # FLD: 0 K/UL — SIGNIFICANT CHANGE UP
NT-PROBNP SERPL-SCNC: 3715 PG/ML — HIGH
PLATELET # BLD AUTO: 234 K/UL — SIGNIFICANT CHANGE UP (ref 150–400)
POTASSIUM SERPL-MCNC: 4.6 MMOL/L — SIGNIFICANT CHANGE UP (ref 3.5–5.3)
POTASSIUM SERPL-SCNC: 4.6 MMOL/L — SIGNIFICANT CHANGE UP (ref 3.5–5.3)
RBC # BLD: 3.3 M/UL — LOW (ref 3.8–5.2)
RBC # FLD: 15.6 % — HIGH (ref 10.3–14.5)
SODIUM SERPL-SCNC: 132 MMOL/L — LOW (ref 135–145)
TSH SERPL-MCNC: 2.04 UIU/ML — SIGNIFICANT CHANGE UP (ref 0.27–4.2)
WBC # BLD: 27.71 K/UL — HIGH (ref 3.8–10.5)
WBC # FLD AUTO: 27.71 K/UL — HIGH (ref 3.8–10.5)

## 2021-02-14 PROCEDURE — 99233 SBSQ HOSP IP/OBS HIGH 50: CPT

## 2021-02-14 RX ORDER — ISOSORBIDE MONONITRATE 60 MG/1
30 TABLET, EXTENDED RELEASE ORAL DAILY
Refills: 0 | Status: DISCONTINUED | OUTPATIENT
Start: 2021-02-15 | End: 2021-02-24

## 2021-02-14 RX ORDER — INFLUENZA VIRUS VACCINE 15; 15; 15; 15 UG/.5ML; UG/.5ML; UG/.5ML; UG/.5ML
0.7 SUSPENSION INTRAMUSCULAR ONCE
Refills: 0 | Status: COMPLETED | OUTPATIENT
Start: 2021-02-14 | End: 2021-02-14

## 2021-02-14 RX ORDER — SODIUM CHLORIDE 9 MG/ML
500 INJECTION INTRAMUSCULAR; INTRAVENOUS; SUBCUTANEOUS ONCE
Refills: 0 | Status: COMPLETED | OUTPATIENT
Start: 2021-02-14 | End: 2021-02-14

## 2021-02-14 RX ORDER — INFLUENZA VIRUS VACCINE 15; 15; 15; 15 UG/.5ML; UG/.5ML; UG/.5ML; UG/.5ML
0.5 SUSPENSION INTRAMUSCULAR ONCE
Refills: 0 | Status: DISCONTINUED | OUTPATIENT
Start: 2021-02-14 | End: 2021-02-14

## 2021-02-14 RX ADMIN — GABAPENTIN 100 MILLIGRAM(S): 400 CAPSULE ORAL at 13:18

## 2021-02-14 RX ADMIN — GABAPENTIN 100 MILLIGRAM(S): 400 CAPSULE ORAL at 21:24

## 2021-02-14 RX ADMIN — Medication 81 MILLIGRAM(S): at 13:18

## 2021-02-14 RX ADMIN — SIMVASTATIN 20 MILLIGRAM(S): 20 TABLET, FILM COATED ORAL at 21:24

## 2021-02-14 RX ADMIN — TRAMADOL HYDROCHLORIDE 50 MILLIGRAM(S): 50 TABLET ORAL at 14:40

## 2021-02-14 RX ADMIN — CITALOPRAM 20 MILLIGRAM(S): 10 TABLET, FILM COATED ORAL at 13:18

## 2021-02-14 RX ADMIN — PREGABALIN 1000 MICROGRAM(S): 225 CAPSULE ORAL at 13:18

## 2021-02-14 RX ADMIN — SODIUM CHLORIDE 500 MILLILITER(S): 9 INJECTION INTRAMUSCULAR; INTRAVENOUS; SUBCUTANEOUS at 12:02

## 2021-02-14 RX ADMIN — RANOLAZINE 500 MILLIGRAM(S): 500 TABLET, FILM COATED, EXTENDED RELEASE ORAL at 13:18

## 2021-02-14 RX ADMIN — Medication 975 MILLIGRAM(S): at 21:24

## 2021-02-14 RX ADMIN — CLOPIDOGREL BISULFATE 75 MILLIGRAM(S): 75 TABLET, FILM COATED ORAL at 13:18

## 2021-02-14 RX ADMIN — TRAMADOL HYDROCHLORIDE 25 MILLIGRAM(S): 50 TABLET ORAL at 04:31

## 2021-02-14 RX ADMIN — GABAPENTIN 100 MILLIGRAM(S): 400 CAPSULE ORAL at 05:49

## 2021-02-14 RX ADMIN — Medication 975 MILLIGRAM(S): at 13:19

## 2021-02-14 RX ADMIN — Medication 15 MILLIGRAM(S): at 11:16

## 2021-02-14 RX ADMIN — SENNA PLUS 2 TABLET(S): 8.6 TABLET ORAL at 21:24

## 2021-02-14 RX ADMIN — Medication 100 MILLIGRAM(S): at 06:58

## 2021-02-14 RX ADMIN — Medication 3 MILLIGRAM(S): at 21:24

## 2021-02-14 RX ADMIN — PANTOPRAZOLE SODIUM 40 MILLIGRAM(S): 20 TABLET, DELAYED RELEASE ORAL at 05:49

## 2021-02-14 RX ADMIN — AMLODIPINE BESYLATE 5 MILLIGRAM(S): 2.5 TABLET ORAL at 05:49

## 2021-02-14 RX ADMIN — Medication 975 MILLIGRAM(S): at 06:58

## 2021-02-14 NOTE — PROGRESS NOTE ADULT - PROBLEM SELECTOR PLAN 5
per PMH, patient not aware of this Dx  DVT ppx per ortho. per PMH, patient not aware of this Dx  DVT ppx w/ ASA per ortho.

## 2021-02-14 NOTE — PHYSICAL THERAPY INITIAL EVALUATION ADULT - LEVEL OF INDEPENDENCE: SUPINE/SIT, REHAB EVAL
supine to long sit, pt defers sitting at EOB secondary to pain, defers assist with left LE from PT/moderate assist (50% patients effort)

## 2021-02-14 NOTE — PROGRESS NOTE ADULT - PROBLEM SELECTOR PLAN 8
possible R distal femoral arterial occlusion noted on imaging (vs. artifact), + bilat doppler signals)  #Thyroid nodule - TSH 2.04  #pleural effusion on CT - R>L, unclear etiology, pro-BNP 3714, avoid fluid overload, monitor I/Os, now on RA, COVID PCR neg (2/12)  #splenic lesion - unknown chronicity, new since 2012 imaging, would discuss with patient when more alert, likely can pursue further w/u as outpatient if desired. possible R distal femoral arterial occlusion noted on imaging (vs. artifact), + bilat doppler signals), consider arterial duplex  #Thyroid nodule - TSH 2.04  #pleural effusion on CT - R>L, likely d/t chf as pro-BNP 3714, avoid fluid overload, d/c IVF, encourage po intake, monitor I/Os, now on RA, COVID PCR neg (2/12)  #splenic lesion - unknown chronicity, new since 2012 imaging, would discuss with patient when more alert, likely can pursue further w/u as outpatient if desired.

## 2021-02-14 NOTE — PROGRESS NOTE ADULT - PROBLEM SELECTOR PLAN 1
cleared by cardiology, note appreciated, pt has PPM for complete heart block  RCRI 1 (CAD)  -s/p left femur IMN on 2/13  -postop management per ortho, pain control, IVF (avoid fluid overload, reduce IVF to 50ml/h), incentive spirometry, DVT ppx  -pro-bnp 3715, risk for fluid overload/chf, consider d/c IVF  -PT consult, likely d/c to rehab cleared by cardiology, note appreciated, pt has PPM for complete heart block  RCRI 1 (CAD)  -s/p left femur IMN on 2/13  -postop management per ortho, pain control, IVF (avoid fluid overload, reduce IVF to 50ml/h), incentive spirometry, DVT ppx  -pro-bnp 3715, risk for fluid overload/chf, consider d/c IVF  -dispo planning, PT recs rehab cleared by cardiology, note appreciated, pt has PPM for complete heart block  RCRI 1 (CAD)  -s/p left femur IMN on 2/13  -postop management per ortho, pain control, d/c IVF (avoid fluid overload with b/l pleural effusions, pro-bnp 3715, likely chf), incentive spirometry, DVT ppx  -pro-bnp 3715, risk for fluid overload/chf, consider d/c IVF  -dispo planning, PT recs rehab

## 2021-02-14 NOTE — PROGRESS NOTE ADULT - SUBJECTIVE AND OBJECTIVE BOX
Anxiety     Anxiety is at manageable level Not Progressing        Ineffective Coping     Unable to state at least 1 coping skill   Not Progressing Dr. Jeannette Roper  Pager [phone]    PROGRESS NOTE:     Patient is a 89y old  Female who presents with a chief complaint of left intertrochanteric fracture (14 Feb 2021 00:18)      SUBJECTIVE / OVERNIGHT EVENTS: pt reports intermittent mild chest discomfort, chronic, unchanged, no sob/dizziness   ADDITIONAL REVIEW OF SYSTEMS: pain controlled     MEDICATIONS  (STANDING):  acetaminophen   Tablet .. 975 milliGRAM(s) Oral every 8 hours  amLODIPine   Tablet 5 milliGRAM(s) Oral daily  aspirin enteric coated 81 milliGRAM(s) Oral daily  citalopram 20 milliGRAM(s) Oral daily  clopidogrel Tablet 75 milliGRAM(s) Oral daily  cyanocobalamin 1000 MICROGram(s) Oral daily  gabapentin 100 milliGRAM(s) Oral every 8 hours  isosorbide   mononitrate ER Tablet (IMDUR) 60 milliGRAM(s) Oral daily  melatonin 3 milliGRAM(s) Oral at bedtime  metoprolol succinate  milliGRAM(s) Oral daily  pantoprazole    Tablet 40 milliGRAM(s) Oral before breakfast  ranolazine 500 milliGRAM(s) Oral daily  senna 2 Tablet(s) Oral at bedtime  simvastatin 20 milliGRAM(s) Oral at bedtime  sodium chloride 0.9%. 1000 milliLiter(s) (100 mL/Hr) IV Continuous <Continuous>    MEDICATIONS  (PRN):  bisacodyl Suppository 10 milliGRAM(s) Rectal daily PRN If no bowel movement by POD#2  fentaNYL    Injectable 25 MICROGram(s) IV Push every 15 minutes PRN Severe Pain (7 - 10)  ketorolac   Injectable 15 milliGRAM(s) IV Push every 6 hours PRN Severe Breakthrough Pain  magnesium hydroxide Suspension 30 milliLiter(s) Oral daily PRN Constipation  ondansetron Injectable 4 milliGRAM(s) IV Push once PRN Nausea and/or Vomiting  traMADol 25 milliGRAM(s) Oral every 4 hours PRN Moderate Pain (4 - 6)  traMADol 50 milliGRAM(s) Oral every 4 hours PRN Severe Pain (7 - 10)      CAPILLARY BLOOD GLUCOSE        I&O's Summary    13 Feb 2021 07:01  -  14 Feb 2021 07:00  --------------------------------------------------------  IN: 475 mL / OUT: 200 mL / NET: 275 mL    14 Feb 2021 07:01  -  14 Feb 2021 11:10  --------------------------------------------------------  IN: 0 mL / OUT: 150 mL / NET: -150 mL        PHYSICAL EXAM:  Vital Signs Last 24 Hrs  T(C): 36.4 (14 Feb 2021 05:38), Max: 37.1 (13 Feb 2021 13:37)  T(F): 97.5 (14 Feb 2021 05:38), Max: 98.8 (13 Feb 2021 13:37)  HR: 67 (14 Feb 2021 10:54) (67 - 95)  BP: 97/73 (14 Feb 2021 10:54) (90/55 - 151/55)  BP(mean): 73 (13 Feb 2021 21:30) (60 - 73)  RR: 16 (14 Feb 2021 05:38) (12 - 19)  SpO2: 95% (14 Feb 2021 05:38) (86% - 100%)  CONSTITUTIONAL: NAD, thin, frail, elderly female  Heent: eyes closed, legally blind   RESPIRATORY: Normal respiratory effort; decreased BS R lung base  CARDIOVASCULAR: Regular rate and rhythm, normal S1 and S2, 3/6 WINSOME   No lower extremity edema; Peripheral pulses are 2+ bilaterally  ABDOMEN: Nontender to palpation, normoactive bowel sounds, no rebound/guarding; No hepatosplenomegaly  MUSCULOSKELETAL: LLE shortened and externally rotated  PSYCH: A+O to person, place; affect appropriate  Skin: L hip bandage, C/D/I      LABS:                        9.7    27.71 )-----------( 234      ( 14 Feb 2021 07:51 )             32.1     02-14    132<L>  |  100  |  25<H>  ----------------------------<  110<H>  4.6   |  16<L>  |  0.61    Ca    8.2<L>      14 Feb 2021 07:51  Mg     1.8     02-12    TPro  6.1  /  Alb  3.6  /  TBili  0.8  /  DBili  x   /  AST  16  /  ALT  10  /  AlkPhos  53  02-12    PT/INR - ( 13 Feb 2021 05:42 )   PT: 13.7 sec;   INR: 1.21 ratio         PTT - ( 13 Feb 2021 05:42 )  PTT:34.1 sec      Urinalysis Basic - ( 13 Feb 2021 01:37 )    Color: Yellow / Appearance: Clear / SG: >1.050 / pH: x  Gluc: x / Ketone: Small  / Bili: Negative / Urobili: <2 mg/dL   Blood: x / Protein: 30 mg/dL / Nitrite: Negative   Leuk Esterase: Negative / RBC: 19 /HPF / WBC 28 /HPF   Sq Epi: x / Non Sq Epi: 1 /HPF / Bacteria: Few          RADIOLOGY & ADDITIONAL TESTS:  Results Reviewed:   Imaging Personally Reviewed:    Electrocardiogram Personally Reviewed:    COORDINATION OF CARE:  Care Discussed with Consultants/Other Providers [Y/N]: ortho resident, resumed on plavix, d/c planning to rehab   Prior or Outpatient Records Reviewed [Y/N]:   Dr. Jeannette Roper  Pager [phone]    PROGRESS NOTE:     Patient is a 89y old  Female who presents with a chief complaint of left intertrochanteric fracture (14 Feb 2021 00:18)      SUBJECTIVE / OVERNIGHT EVENTS: pt reports intermittent mild chest discomfort, chronic, unchanged, no sob/dizziness   ADDITIONAL REVIEW OF SYSTEMS: pain controlled     MEDICATIONS  (STANDING):  acetaminophen   Tablet .. 975 milliGRAM(s) Oral every 8 hours  amLODIPine   Tablet 5 milliGRAM(s) Oral daily  aspirin enteric coated 81 milliGRAM(s) Oral daily  citalopram 20 milliGRAM(s) Oral daily  clopidogrel Tablet 75 milliGRAM(s) Oral daily  cyanocobalamin 1000 MICROGram(s) Oral daily  gabapentin 100 milliGRAM(s) Oral every 8 hours  isosorbide   mononitrate ER Tablet (IMDUR) 60 milliGRAM(s) Oral daily  melatonin 3 milliGRAM(s) Oral at bedtime  metoprolol succinate  milliGRAM(s) Oral daily  pantoprazole    Tablet 40 milliGRAM(s) Oral before breakfast  ranolazine 500 milliGRAM(s) Oral daily  senna 2 Tablet(s) Oral at bedtime  simvastatin 20 milliGRAM(s) Oral at bedtime  sodium chloride 0.9%. 1000 milliLiter(s) (100 mL/Hr) IV Continuous <Continuous>    MEDICATIONS  (PRN):  bisacodyl Suppository 10 milliGRAM(s) Rectal daily PRN If no bowel movement by POD#2  fentaNYL    Injectable 25 MICROGram(s) IV Push every 15 minutes PRN Severe Pain (7 - 10)  ketorolac   Injectable 15 milliGRAM(s) IV Push every 6 hours PRN Severe Breakthrough Pain  magnesium hydroxide Suspension 30 milliLiter(s) Oral daily PRN Constipation  ondansetron Injectable 4 milliGRAM(s) IV Push once PRN Nausea and/or Vomiting  traMADol 25 milliGRAM(s) Oral every 4 hours PRN Moderate Pain (4 - 6)  traMADol 50 milliGRAM(s) Oral every 4 hours PRN Severe Pain (7 - 10)      CAPILLARY BLOOD GLUCOSE        I&O's Summary    13 Feb 2021 07:01  -  14 Feb 2021 07:00  --------------------------------------------------------  IN: 475 mL / OUT: 200 mL / NET: 275 mL    14 Feb 2021 07:01  -  14 Feb 2021 11:10  --------------------------------------------------------  IN: 0 mL / OUT: 150 mL / NET: -150 mL        PHYSICAL EXAM:  Vital Signs Last 24 Hrs  T(C): 36.4 (14 Feb 2021 05:38), Max: 37.1 (13 Feb 2021 13:37)  T(F): 97.5 (14 Feb 2021 05:38), Max: 98.8 (13 Feb 2021 13:37)  HR: 67 (14 Feb 2021 10:54) (67 - 95)  BP: 97/73 (14 Feb 2021 10:54) (90/55 - 151/55)  BP(mean): 73 (13 Feb 2021 21:30) (60 - 73)  RR: 16 (14 Feb 2021 05:38) (12 - 19)  SpO2: 95% (14 Feb 2021 05:38) (86% - 100%)  CONSTITUTIONAL: NAD, thin, frail, elderly female  Heent: eyes closed, legally blind   RESPIRATORY: Normal respiratory effort; decreased BS R lung base  CARDIOVASCULAR: Regular rate and rhythm, normal S1 and S2, 3/6 WINSOME   No lower extremity edema; Peripheral pulses are 2+ bilaterally  ABDOMEN: Nontender to palpation, normoactive bowel sounds, no rebound/guarding; No hepatosplenomegaly  MUSCULOSKELETAL: LLE shortened and externally rotated  PSYCH: A+O to person, place; affect appropriate  Skin: L hip bandage, C/D/I      LABS:                        9.7    27.71 )-----------( 234      ( 14 Feb 2021 07:51 )             32.1     02-14    132<L>  |  100  |  25<H>  ----------------------------<  110<H>  4.6   |  16<L>  |  0.61    Ca    8.2<L>      14 Feb 2021 07:51  Mg     1.8     02-12    TPro  6.1  /  Alb  3.6  /  TBili  0.8  /  DBili  x   /  AST  16  /  ALT  10  /  AlkPhos  53  02-12    PT/INR - ( 13 Feb 2021 05:42 )   PT: 13.7 sec;   INR: 1.21 ratio         PTT - ( 13 Feb 2021 05:42 )  PTT:34.1 sec      Urinalysis Basic - ( 13 Feb 2021 01:37 )    Color: Yellow / Appearance: Clear / SG: >1.050 / pH: x  Gluc: x / Ketone: Small  / Bili: Negative / Urobili: <2 mg/dL   Blood: x / Protein: 30 mg/dL / Nitrite: Negative   Leuk Esterase: Negative / RBC: 19 /HPF / WBC 28 /HPF   Sq Epi: x / Non Sq Epi: 1 /HPF / Bacteria: Few          RADIOLOGY & ADDITIONAL TESTS:  Results Reviewed:   Imaging Personally Reviewed:    Electrocardiogram Personally Reviewed:    COORDINATION OF CARE:  Care Discussed with Consultants/Other Providers [Y/N]: ortho resident, resumed on plavix, d/c planning to rehab , d/c IVF, norvasc, reduce imdur to 30mg  Prior or Outpatient Records Reviewed [Y/N]:

## 2021-02-14 NOTE — PROGRESS NOTE ADULT - PROBLEM SELECTOR PLAN 3
-h/o CAD/stent  -resumed plavix 75 mg qd postop   -c/w home medications (Toprol, imdur, norvasc, ranexa)  -pt has chronic intermittent chest pain, unchanged from earlier, per cards no further w/u required. -h/o CAD/stent  -resumed plavix 75 mg qd postop   -adjust home meds c/w toprol, reduce imdur to 30mg, d/c norvasc d/t soft bp, c/w ranexa for chronic angina, d/w ortho resident  -pt has chronic intermittent chest pain, unchanged from earlier, per cards no further w/u required.

## 2021-02-14 NOTE — PROGRESS NOTE ADULT - PROBLEM SELECTOR PLAN 4
-transfused 1 unit prbc on 2/13 preop for hgb 8.6  -trend CBC and transfuse prn  -plavix resumed, also started on ASA for DVT ppx per ortho

## 2021-02-14 NOTE — PHYSICAL THERAPY INITIAL EVALUATION ADULT - GENERAL OBSERVATIONS, REHAB EVAL
Pt received semi-barney in bed, NAD. Pt agreeable to PT consultation. Cleared for PT as per NADYA Gates

## 2021-02-14 NOTE — PHYSICAL THERAPY INITIAL EVALUATION ADULT - ADDITIONAL COMMENTS
As per EMR, pt lives with sister and niece in 2 family house. pt has home health aide 10 hours per day - 5 days per week, 8 hours per day 2 days per week. 5 steps to negotiate. As per RN, pt was ambulatory with rolling walker. pt is poor historian.    Pt left semi-barney in bed, NAD. +call bell. RN aware of session.

## 2021-02-14 NOTE — PROGRESS NOTE ADULT - PROBLEM SELECTOR PLAN 6
c/w home meds with hold parameters given soft BP s/p pain meds. bp soft postop, d/c norvasc, reduce imdur to 30 mg qd  c/w ranexa and metoprolol with holding parameters (hold for sbp<110 or hr<60).  d/c IVF d/t chf

## 2021-02-15 LAB
ANION GAP SERPL CALC-SCNC: 8 MMOL/L — SIGNIFICANT CHANGE UP (ref 7–14)
ANION GAP SERPL CALC-SCNC: 9 MMOL/L — SIGNIFICANT CHANGE UP (ref 7–14)
BUN SERPL-MCNC: 25 MG/DL — HIGH (ref 7–23)
BUN SERPL-MCNC: 25 MG/DL — HIGH (ref 7–23)
CALCIUM SERPL-MCNC: 7.8 MG/DL — LOW (ref 8.4–10.5)
CALCIUM SERPL-MCNC: 7.9 MG/DL — LOW (ref 8.4–10.5)
CHLORIDE SERPL-SCNC: 100 MMOL/L — SIGNIFICANT CHANGE UP (ref 98–107)
CHLORIDE SERPL-SCNC: 101 MMOL/L — SIGNIFICANT CHANGE UP (ref 98–107)
CK MB BLD-MCNC: 5.7 % — HIGH (ref 0–2.5)
CK MB CFR SERPL CALC: 3.9 NG/ML — SIGNIFICANT CHANGE UP
CK SERPL-CCNC: 69 U/L — SIGNIFICANT CHANGE UP (ref 25–170)
CO2 SERPL-SCNC: 22 MMOL/L — SIGNIFICANT CHANGE UP (ref 22–31)
CO2 SERPL-SCNC: 23 MMOL/L — SIGNIFICANT CHANGE UP (ref 22–31)
CREAT SERPL-MCNC: 0.59 MG/DL — SIGNIFICANT CHANGE UP (ref 0.5–1.3)
CREAT SERPL-MCNC: 0.6 MG/DL — SIGNIFICANT CHANGE UP (ref 0.5–1.3)
GLUCOSE SERPL-MCNC: 84 MG/DL — SIGNIFICANT CHANGE UP (ref 70–99)
GLUCOSE SERPL-MCNC: 97 MG/DL — SIGNIFICANT CHANGE UP (ref 70–99)
HCT VFR BLD CALC: 24.7 % — LOW (ref 34.5–45)
HCT VFR BLD CALC: 25.9 % — LOW (ref 34.5–45)
HGB BLD-MCNC: 7.8 G/DL — LOW (ref 11.5–15.5)
HGB BLD-MCNC: 7.9 G/DL — LOW (ref 11.5–15.5)
MCHC RBC-ENTMCNC: 29.5 PG — SIGNIFICANT CHANGE UP (ref 27–34)
MCHC RBC-ENTMCNC: 29.6 PG — SIGNIFICANT CHANGE UP (ref 27–34)
MCHC RBC-ENTMCNC: 30.5 GM/DL — LOW (ref 32–36)
MCHC RBC-ENTMCNC: 31.6 GM/DL — LOW (ref 32–36)
MCV RBC AUTO: 93.6 FL — SIGNIFICANT CHANGE UP (ref 80–100)
MCV RBC AUTO: 97 FL — SIGNIFICANT CHANGE UP (ref 80–100)
NRBC # BLD: 0 /100 WBCS — SIGNIFICANT CHANGE UP
NRBC # BLD: 0 /100 WBCS — SIGNIFICANT CHANGE UP
NRBC # FLD: 0 K/UL — SIGNIFICANT CHANGE UP
NRBC # FLD: 0 K/UL — SIGNIFICANT CHANGE UP
PLATELET # BLD AUTO: 173 K/UL — SIGNIFICANT CHANGE UP (ref 150–400)
PLATELET # BLD AUTO: 184 K/UL — SIGNIFICANT CHANGE UP (ref 150–400)
POTASSIUM SERPL-MCNC: 4.2 MMOL/L — SIGNIFICANT CHANGE UP (ref 3.5–5.3)
POTASSIUM SERPL-MCNC: 4.4 MMOL/L — SIGNIFICANT CHANGE UP (ref 3.5–5.3)
POTASSIUM SERPL-SCNC: 4.2 MMOL/L — SIGNIFICANT CHANGE UP (ref 3.5–5.3)
POTASSIUM SERPL-SCNC: 4.4 MMOL/L — SIGNIFICANT CHANGE UP (ref 3.5–5.3)
RBC # BLD: 2.64 M/UL — LOW (ref 3.8–5.2)
RBC # BLD: 2.67 M/UL — LOW (ref 3.8–5.2)
RBC # FLD: 15.1 % — HIGH (ref 10.3–14.5)
RBC # FLD: 15.1 % — HIGH (ref 10.3–14.5)
SODIUM SERPL-SCNC: 131 MMOL/L — LOW (ref 135–145)
SODIUM SERPL-SCNC: 132 MMOL/L — LOW (ref 135–145)
TROPONIN T, HIGH SENSITIVITY RESULT: 61 NG/L — CRITICAL HIGH
TROPONIN T, HIGH SENSITIVITY RESULT: 61 NG/L — CRITICAL HIGH
WBC # BLD: 12.47 K/UL — HIGH (ref 3.8–10.5)
WBC # BLD: 12.53 K/UL — HIGH (ref 3.8–10.5)
WBC # FLD AUTO: 12.47 K/UL — HIGH (ref 3.8–10.5)
WBC # FLD AUTO: 12.53 K/UL — HIGH (ref 3.8–10.5)

## 2021-02-15 PROCEDURE — 99233 SBSQ HOSP IP/OBS HIGH 50: CPT

## 2021-02-15 RX ADMIN — Medication 975 MILLIGRAM(S): at 13:31

## 2021-02-15 RX ADMIN — Medication 3 MILLIGRAM(S): at 21:57

## 2021-02-15 RX ADMIN — GABAPENTIN 100 MILLIGRAM(S): 400 CAPSULE ORAL at 13:31

## 2021-02-15 RX ADMIN — Medication 81 MILLIGRAM(S): at 10:55

## 2021-02-15 RX ADMIN — Medication 975 MILLIGRAM(S): at 21:57

## 2021-02-15 RX ADMIN — GABAPENTIN 100 MILLIGRAM(S): 400 CAPSULE ORAL at 05:49

## 2021-02-15 RX ADMIN — CLOPIDOGREL BISULFATE 75 MILLIGRAM(S): 75 TABLET, FILM COATED ORAL at 10:55

## 2021-02-15 RX ADMIN — RANOLAZINE 500 MILLIGRAM(S): 500 TABLET, FILM COATED, EXTENDED RELEASE ORAL at 10:55

## 2021-02-15 RX ADMIN — GABAPENTIN 100 MILLIGRAM(S): 400 CAPSULE ORAL at 21:57

## 2021-02-15 RX ADMIN — CITALOPRAM 20 MILLIGRAM(S): 10 TABLET, FILM COATED ORAL at 10:55

## 2021-02-15 RX ADMIN — Medication 975 MILLIGRAM(S): at 05:49

## 2021-02-15 RX ADMIN — SIMVASTATIN 20 MILLIGRAM(S): 20 TABLET, FILM COATED ORAL at 21:57

## 2021-02-15 RX ADMIN — PANTOPRAZOLE SODIUM 40 MILLIGRAM(S): 20 TABLET, DELAYED RELEASE ORAL at 05:49

## 2021-02-15 RX ADMIN — PREGABALIN 1000 MICROGRAM(S): 225 CAPSULE ORAL at 10:55

## 2021-02-15 RX ADMIN — SENNA PLUS 2 TABLET(S): 8.6 TABLET ORAL at 21:57

## 2021-02-15 NOTE — PROGRESS NOTE ADULT - PROBLEM SELECTOR PLAN 8
possible R distal femoral arterial occlusion noted on imaging (vs. artifact), + bilat doppler signals), consider arterial duplex  #Thyroid nodule - TSH 2.04  #pleural effusion on CT - R>L, likely d/t chf as pro-BNP 3714, avoid fluid overload, d/c IVF, encourage po intake, monitor I/Os, now on RA, COVID PCR neg (2/12)  #splenic lesion - unknown chronicity, new since 2012 imaging, would discuss with patient when more alert, likely can pursue further w/u as outpatient if desired.

## 2021-02-15 NOTE — PROGRESS NOTE ADULT - PROBLEM SELECTOR PLAN 1
cleared by cardiology, note appreciated, pt has PPM for complete heart block  RCRI 1 (CAD)  -s/p left femur IMN on 2/13  -postop management per ortho, pain control, d/c IVF (avoid fluid overload with b/l pleural effusions, pro-bnp 3715, likely chf), incentive spirometry, DVT ppx  -pro-bnp 3715, risk for fluid overload/chf, consider d/c IVF  -dispo planning, PT recs rehab

## 2021-02-15 NOTE — PROGRESS NOTE ADULT - SUBJECTIVE AND OBJECTIVE BOX
Pt seen/examined. Chest pain and left shoulder pain overnight. Per patient this is chronic pain that comes and goes.     T(C): 36.4 (02-15-21 @ 05:47), Max: 36.4 (02-14-21 @ 10:54)  HR: 74 (02-15-21 @ 05:47) (66 - 77)  BP: 114/44 (02-15-21 @ 05:47) (97/73 - 116/42)  RR: 17 (02-15-21 @ 05:47) (16 - 18)  SpO2: 100% (02-15-21 @ 05:47) (92% - 100%)  Wt(kg): --                          7.8    12.47 )-----------( 173      ( 15 Feb 2021 06:12 )             24.7                         7.9    12.53 )-----------( 184      ( 15 Feb 2021 02:56 )             25.9       02-15    132<L>  |  100  |  25<H>  ----------------------------<  84  4.2   |  23  |  0.59            Gen: awake, alert, NAD, laying in bed comfortably  Resp: no increased work of breathing, on 2 liters nasal cannula  LLE:  Dressings c/d/i  +EHL/FHL/TA/GS  SILT S/S/SP/DP  +DP/PT Pulses  Compartments soft  No calf TTP     89yFemale s/p left hip IMN, POD 2.     - Pain control  - mechanical/DVT ppx  - OOB/PT  - FU AM labs  - Appreciate cardiology recs  - Dispo planning

## 2021-02-15 NOTE — PROGRESS NOTE ADULT - PROBLEM SELECTOR PLAN 4
-transfused 1 unit prbc on 2/13 preop for hgb 8.6, transfused 1 more unit on 2/15 for hgb 7.8  -trend CBC and transfuse prn  -plavix resumed, also started on ASA for DVT ppx per ortho

## 2021-02-15 NOTE — PROGRESS NOTE ADULT - PROBLEM SELECTOR PLAN 3
-h/o CAD/stent  -resumed plavix 75 mg qd postop   -adjust home meds c/w toprol, reduce imdur to 30mg, d/c norvasc d/t soft bp, c/w ranexa for chronic angina, d/w ortho resident  -pt has chronic intermittent chest pain, unchanged from earlier, per cards no further w/u required.  -elevated troponin 61-->61 (flat) likely d/t demand ischemia, transfused 1 unit prbc on 2/15, keep hgb >8 , f/u cardiology

## 2021-02-15 NOTE — PROGRESS NOTE ADULT - SUBJECTIVE AND OBJECTIVE BOX
Dr. Jeannette Roper  Pager [phone]    PROGRESS NOTE:     Patient is a 89y old  Female who presents with a chief complaint of left intertrochanteric fracture (15 Feb 2021 07:05)      SUBJECTIVE / OVERNIGHT EVENTS: pt with intermittent mild angina, no sob  ADDITIONAL REVIEW OF SYSTEMS: afebrile, transfused 1 u prbc this morning    MEDICATIONS  (STANDING):  acetaminophen   Tablet .. 975 milliGRAM(s) Oral every 8 hours  aspirin enteric coated 81 milliGRAM(s) Oral daily  citalopram 20 milliGRAM(s) Oral daily  clopidogrel Tablet 75 milliGRAM(s) Oral daily  cyanocobalamin 1000 MICROGram(s) Oral daily  gabapentin 100 milliGRAM(s) Oral every 8 hours  isosorbide   mononitrate ER Tablet (IMDUR) 30 milliGRAM(s) Oral daily  melatonin 3 milliGRAM(s) Oral at bedtime  metoprolol succinate  milliGRAM(s) Oral daily  pantoprazole    Tablet 40 milliGRAM(s) Oral before breakfast  ranolazine 500 milliGRAM(s) Oral daily  senna 2 Tablet(s) Oral at bedtime  simvastatin 20 milliGRAM(s) Oral at bedtime    MEDICATIONS  (PRN):  bisacodyl Suppository 10 milliGRAM(s) Rectal daily PRN If no bowel movement by POD#2  fentaNYL    Injectable 25 MICROGram(s) IV Push every 15 minutes PRN Severe Pain (7 - 10)  ketorolac   Injectable 15 milliGRAM(s) IV Push every 6 hours PRN Severe Breakthrough Pain  magnesium hydroxide Suspension 30 milliLiter(s) Oral daily PRN Constipation  ondansetron Injectable 4 milliGRAM(s) IV Push once PRN Nausea and/or Vomiting  traMADol 25 milliGRAM(s) Oral every 4 hours PRN Moderate Pain (4 - 6)  traMADol 50 milliGRAM(s) Oral every 4 hours PRN Severe Pain (7 - 10)      CAPILLARY BLOOD GLUCOSE        I&O's Summary    14 Feb 2021 07:01  -  15 Feb 2021 07:00  --------------------------------------------------------  IN: 0 mL / OUT: 750 mL / NET: -750 mL        PHYSICAL EXAM:  Vital Signs Last 24 Hrs  T(C): 36.4 (15 Feb 2021 09:48), Max: 36.4 (14 Feb 2021 14:36)  T(F): 97.5 (15 Feb 2021 09:48), Max: 97.6 (15 Feb 2021 01:45)  HR: 75 (15 Feb 2021 09:48) (66 - 77)  BP: 101/43 (15 Feb 2021 09:48) (101/43 - 116/42)  BP(mean): --  RR: 17 (15 Feb 2021 09:48) (16 - 18)  SpO2: 100% (15 Feb 2021 09:48) (92% - 100%)  CONSTITUTIONAL: NAD, thin, frail, elderly female  Heent: eyes closed, legally blind   RESPIRATORY: Normal respiratory effort; decreased BS R lung base  CARDIOVASCULAR: Regular rate and rhythm, normal S1 and S2, 3/6 WINSOME   No lower extremity edema; b/l pedal pulse dopplerable  ABDOMEN: Nontender to palpation, normoactive bowel sounds, no rebound/guarding; No hepatosplenomegaly  MUSCULOSKELETAL: LLE shortened and externally rotated  PSYCH: A+O to person, place; affect appropriate  Skin: L hip bandage, C/D/I    LABS:                        7.8    12.47 )-----------( 173      ( 15 Feb 2021 06:12 )             24.7     02-15    132<L>  |  100  |  25<H>  ----------------------------<  84  4.2   |  23  |  0.59    Ca    7.9<L>      15 Feb 2021 06:12        CARDIAC MARKERS ( 15 Feb 2021 03:04 )  x     / x     / 69 U/L / x     / 3.9 ng/mL        RADIOLOGY & ADDITIONAL TESTS:  Results Reviewed:   Imaging Personally Reviewed:  Electrocardiogram Personally Reviewed:    COORDINATION OF CARE:  Care Discussed with Consultants/Other Providers [Y/N]: ortho resident, transfuse 1u prbc   Prior or Outpatient Records Reviewed [Y/N]:

## 2021-02-15 NOTE — PROGRESS NOTE ADULT - PROBLEM SELECTOR PLAN 6
bp soft postop, d/c norvasc, reduce imdur to 30 mg qd  c/w ranexa and metoprolol with holding parameters (hold for sbp<110 or hr<60).  dcd IVF d/t chf, encourage po intake

## 2021-02-16 ENCOUNTER — TRANSCRIPTION ENCOUNTER (OUTPATIENT)
Age: 86
End: 2021-02-16

## 2021-02-16 DIAGNOSIS — E87.1 HYPO-OSMOLALITY AND HYPONATREMIA: ICD-10-CM

## 2021-02-16 LAB
ANION GAP SERPL CALC-SCNC: 10 MMOL/L — SIGNIFICANT CHANGE UP (ref 7–14)
BUN SERPL-MCNC: 20 MG/DL — SIGNIFICANT CHANGE UP (ref 7–23)
CALCIUM SERPL-MCNC: 7.8 MG/DL — LOW (ref 8.4–10.5)
CHLORIDE SERPL-SCNC: 97 MMOL/L — LOW (ref 98–107)
CO2 SERPL-SCNC: 22 MMOL/L — SIGNIFICANT CHANGE UP (ref 22–31)
CREAT SERPL-MCNC: 0.48 MG/DL — LOW (ref 0.5–1.3)
GLUCOSE SERPL-MCNC: 83 MG/DL — SIGNIFICANT CHANGE UP (ref 70–99)
HCT VFR BLD CALC: 34.4 % — LOW (ref 34.5–45)
HGB BLD-MCNC: 10.2 G/DL — LOW (ref 11.5–15.5)
MCHC RBC-ENTMCNC: 29.6 PG — SIGNIFICANT CHANGE UP (ref 27–34)
MCHC RBC-ENTMCNC: 29.7 GM/DL — LOW (ref 32–36)
MCV RBC AUTO: 99.7 FL — SIGNIFICANT CHANGE UP (ref 80–100)
NRBC # BLD: 0 /100 WBCS — SIGNIFICANT CHANGE UP
NRBC # FLD: 0 K/UL — SIGNIFICANT CHANGE UP
PLATELET # BLD AUTO: 189 K/UL — SIGNIFICANT CHANGE UP (ref 150–400)
POTASSIUM SERPL-MCNC: 3.8 MMOL/L — SIGNIFICANT CHANGE UP (ref 3.5–5.3)
POTASSIUM SERPL-SCNC: 3.8 MMOL/L — SIGNIFICANT CHANGE UP (ref 3.5–5.3)
RBC # BLD: 3.45 M/UL — LOW (ref 3.8–5.2)
RBC # FLD: 14.8 % — HIGH (ref 10.3–14.5)
SARS-COV-2 RNA SPEC QL NAA+PROBE: SIGNIFICANT CHANGE UP
SODIUM SERPL-SCNC: 129 MMOL/L — LOW (ref 135–145)
WBC # BLD: 10.77 K/UL — HIGH (ref 3.8–10.5)
WBC # FLD AUTO: 10.77 K/UL — HIGH (ref 3.8–10.5)

## 2021-02-16 PROCEDURE — 99233 SBSQ HOSP IP/OBS HIGH 50: CPT

## 2021-02-16 PROCEDURE — 99222 1ST HOSP IP/OBS MODERATE 55: CPT

## 2021-02-16 RX ADMIN — Medication 975 MILLIGRAM(S): at 22:11

## 2021-02-16 RX ADMIN — PANTOPRAZOLE SODIUM 40 MILLIGRAM(S): 20 TABLET, DELAYED RELEASE ORAL at 06:17

## 2021-02-16 RX ADMIN — CITALOPRAM 20 MILLIGRAM(S): 10 TABLET, FILM COATED ORAL at 11:07

## 2021-02-16 RX ADMIN — GABAPENTIN 100 MILLIGRAM(S): 400 CAPSULE ORAL at 22:12

## 2021-02-16 RX ADMIN — Medication 975 MILLIGRAM(S): at 06:17

## 2021-02-16 RX ADMIN — Medication 81 MILLIGRAM(S): at 11:06

## 2021-02-16 RX ADMIN — Medication 100 MILLIGRAM(S): at 06:17

## 2021-02-16 RX ADMIN — Medication 3 MILLIGRAM(S): at 22:11

## 2021-02-16 RX ADMIN — RANOLAZINE 500 MILLIGRAM(S): 500 TABLET, FILM COATED, EXTENDED RELEASE ORAL at 11:07

## 2021-02-16 RX ADMIN — GABAPENTIN 100 MILLIGRAM(S): 400 CAPSULE ORAL at 13:50

## 2021-02-16 RX ADMIN — SENNA PLUS 2 TABLET(S): 8.6 TABLET ORAL at 22:11

## 2021-02-16 RX ADMIN — SIMVASTATIN 20 MILLIGRAM(S): 20 TABLET, FILM COATED ORAL at 22:11

## 2021-02-16 RX ADMIN — GABAPENTIN 100 MILLIGRAM(S): 400 CAPSULE ORAL at 06:16

## 2021-02-16 RX ADMIN — Medication 975 MILLIGRAM(S): at 13:50

## 2021-02-16 RX ADMIN — ISOSORBIDE MONONITRATE 30 MILLIGRAM(S): 60 TABLET, EXTENDED RELEASE ORAL at 11:06

## 2021-02-16 RX ADMIN — PREGABALIN 1000 MICROGRAM(S): 225 CAPSULE ORAL at 11:07

## 2021-02-16 RX ADMIN — CLOPIDOGREL BISULFATE 75 MILLIGRAM(S): 75 TABLET, FILM COATED ORAL at 11:07

## 2021-02-16 NOTE — PROGRESS NOTE ADULT - PROBLEM SELECTOR PLAN 1
S/P left femur IMN on 2/13  - postop management per ortho, pain control, d/c IVF (avoid fluid overload with b/l pleural effusions, pro-bnp 3715, likely chf), incentive spirometry, DVT ppx  - dispo planning, PT recs rehab

## 2021-02-16 NOTE — DISCHARGE NOTE PROVIDER - NSDCMRMEDTOKEN_GEN_ALL_CORE_FT
amLODIPine 5 mg oral tablet: 1 tab(s) orally once a day  isosorbide mononitrate 60 mg oral tablet, extended release: 1 tab(s) orally once a day  metoprolol succinate 100 mg oral tablet, extended release: 1 tab(s) orally once a day  Plavix 75 mg oral tablet: 1 tab(s) orally once a day  Vitamin B-12 1000 mcg sublingual tablet: 1 tab(s) sublingual once a day  Zocor 20 mg oral tablet: 1 tab(s) orally once a day (at bedtime)   acetaminophen 325 mg oral tablet: 3 tab(s) orally every 8 hours as needed for Mild pain (1-3)  amLODIPine 5 mg oral tablet: 1 tab(s) orally once a day  held during hospitalization due to well controlled BP  MAY RESUME BP WHEN SBP GREATER THAN 140  ascorbic acid 500 mg oral tablet: 1 tab(s) orally once a day  aspirin 81 mg oral delayed release tablet: 1 tab(s) orally once a day  calcium-vitamin D 500 mg-200 intl units (5 mcg) oral tablet: 1 tab(s) orally once a day  citalopram 20 mg oral tablet: 1 tab(s) orally once a day  gabapentin 100 mg oral capsule: 1 cap(s) orally every 8 hours  isosorbide mononitrate 60 mg oral tablet, extended release: 1 tab(s) orally once a day  melatonin 3 mg oral tablet: 1 tab(s) orally once a day (at bedtime)  metoprolol succinate 100 mg oral tablet, extended release: 1 tab(s) orally once a day  pantoprazole 40 mg oral delayed release tablet: 1 tab(s) orally once a day (before a meal)  Plavix 75 mg oral tablet: 1 tab(s) orally once a day  ranolazine 500 mg oral tablet, extended release: 1 tab(s) orally once a day  senna oral tablet: 2 tab(s) orally once a day (at bedtime)  discharge home with over the counter for constipation caused by pain meds  traMADol 50 mg oral tablet: 0.5 tab(s) orally every 4 hours, As needed, Moderate Pain (4 - 6)  traMADol 50 mg oral tablet: 1 tab(s) orally every 4 hours, As needed, Severe Pain (7 - 10)  Vitamin B-12 1000 mcg sublingual tablet: 1 tab(s) sublingual once a day  Zocor 20 mg oral tablet: 1 tab(s) orally once a day (at bedtime)   acetaminophen 325 mg oral tablet: 3 tab(s) orally every 8 hours as needed for Mild pain (1-3)  ascorbic acid 500 mg oral tablet: 1 tab(s) orally once a day  aspirin 81 mg oral delayed release tablet: 1 tab(s) orally once a day  calcium-vitamin D 500 mg-200 intl units (5 mcg) oral tablet: 1 tab(s) orally once a day  citalopram 20 mg oral tablet: 1 tab(s) orally once a day  diphenhydramine/lidocaine/aluminum hydroxide/magnesium hydroxide/simethicone mucous membrane suspension: 5 milliliter(s) oral transmucosal 2 times a day  Magic mouthwash swish and spit BID  14day course...last dose 3/8/2021  gabapentin 100 mg oral capsule: 1 cap(s) orally every 8 hours  isosorbide mononitrate 60 mg oral tablet, extended release: 1 tab(s) orally once a day  melatonin 3 mg oral tablet: 1 tab(s) orally once a day (at bedtime)  metoprolol succinate 100 mg oral tablet, extended release: 1 tab(s) orally once a day  nystatin 100,000 units/mL oral suspension: 5 milliliter(s) orally swish and swallow 2 times a day  14day course...last dose 3/8/2021  pantoprazole 40 mg oral delayed release tablet: 1 tab(s) orally once a day (before a meal)  Plavix 75 mg oral tablet: 1 tab(s) orally once a day  ranolazine 500 mg oral tablet, extended release: 1 tab(s) orally once a day  senna oral tablet: 2 tab(s) orally once a day (at bedtime)  discharge home with over the counter for constipation caused by pain meds  sodium chloride 1 g oral tablet: 1 tab(s) orally 2 times a day  continue until serum Na greater or equal to 130  any questions pls refer to Renal Dr. Chakrbaorty  traMADol 50 mg oral tablet: 0.5 tab(s) orally every 4 hours, As needed, Moderate Pain (4 - 6)  traMADol 50 mg oral tablet: 1 tab(s) orally every 4 hours, As needed, Severe Pain (7 - 10)  Vitamin B-12 1000 mcg sublingual tablet: 1 tab(s) sublingual once a day  Zocor 20 mg oral tablet: 1 tab(s) orally once a day (at bedtime)

## 2021-02-16 NOTE — DISCHARGE NOTE PROVIDER - CARE PROVIDER_API CALL
Adams Menon)  Orthopedics  [address], [address]  [place] [address]  Phone: [phone]  Fax: [phone]  Established Patient  Follow Up Time: 2 weeks

## 2021-02-16 NOTE — DISCHARGE NOTE PROVIDER - NSDCCPCAREPLAN_GEN_ALL_CORE_FT
PRINCIPAL DISCHARGE DIAGNOSIS  Diagnosis: Intertrochanteric fracture  Assessment and Plan of Treatment: 89year old female legally blind is admitted for mechanical fall sustained Left hip intertrochanteric fracture and underwent Left hip intramedullary nail 2/13/2021 without any intraoperative complications.  Patient is doing well and stable for discharge.  Patient is tolerating physical therapy: weight bearing to Left leg, out of bed for gait training, and exercises as shown by Physical Therapy.  Keep wound dressing on as is until day of office visit.  Staples/sutures if applicable, to be removed in the office 14 days from date of surgery.  Patient is on Aspirin (MUST TAKE WITH FOOD) and Plavix for anticoagulation.  Orthopaedic Surgeon Dr. Menon would like patient to call private MD/Internist for appointment postop to maintain continuity of care.  Follow up with Dr. Menon's office in 1-2 weeks.       PRINCIPAL DISCHARGE DIAGNOSIS  Diagnosis: Intertrochanteric fracture  Assessment and Plan of Treatment: 89year old female legally blind is admitted for mechanical fall sustained Left hip intertrochanteric fracture and underwent Left hip intramedullary nail 2/13/2021 without any intraoperative complications.  Patient is doing well and stable for discharge.  Patient is tolerating physical therapy: weight bearing to Left leg, out of bed for gait training, and exercises as shown by Physical Therapy.  Keep wound dressing on as is until day of office visit.  Staples/sutures if applicable, to be removed in the office 14 days from date of surgery.  Patient is on Aspirin (MUST TAKE WITH FOOD) and Plavix for anticoagulation.  Patient was seen by hospitalist for co-management of medical aspects...patient's home med amlodipine is held during hospitalization as BP was well controlled, and hospitalist recommendation is to resume amlodipine at rehab when SBP>140.  Orthopaedic Surgeon Dr. Menon would like patient to call private MD/Internist for appointment postop to maintain continuity of care.  Follow up with Dr. Menon's office in 1-2 weeks.       PRINCIPAL DISCHARGE DIAGNOSIS  Diagnosis: Closed nondisplaced intertrochanteric fracture of left femur, initial encounter  Assessment and Plan of Treatment: 89year old female legally blind is admitted for mechanical fall sustained Left hip intertrochanteric fracture and underwent Left hip intramedullary nail 2/13/2021 without any intraoperative complications.  Patient is doing well and stable for discharge.  Patient is tolerating physical therapy: weight bearing to Left leg, out of bed for gait training, and exercises as shown by Physical Therapy.  Keep wound dressing on as is until day of office visit.  Staples/sutures if applicable, to be removed in the office 14 days from date of surgery.  Patient is on Aspirin (MUST TAKE WITH FOOD) and Plavix for anticoagulation.  Orthopaedic Surgeon Dr. Menon would like patient to call private MD/Internist for appointment postop to maintain continuity of care.  Follow up with Dr. Menon's office in 1-2 weeks.      SECONDARY DISCHARGE DIAGNOSES  Diagnosis: Hyponatremia  Assessment and Plan of Treatment: Patient also seen by Nephrologist Dr. Chakraborty for hyponatremia (Na 125 2/23/2021): urine studies consistent with SIADH - improved as of today after one dose of Tolvaptan, Na131 2/24/2021  recommended  (1)Add NaCl 1gm po bid   (2)Continue free water restriction  (3)Repeat BMP 3-5 days after discharge; no renal objection to discontinuing salt tabs as outpatient if Na 130 or higher on outpatient bloodwork  follow up with   Josh Chakraborty MD  [facility]  Office: [phone]  Cell: [phone]    Diagnosis: Oral candidiasis  Assessment and Plan of Treatment: Patient was also seen by ENT for oral Candida: treated with oral nystatin and Magic mouthwash for 14day course...last day 3/8/2021, encourage lozenges to prevent tongue cracking from dryness.    Diagnosis: CLL (chronic lymphocytic leukemia)  Assessment and Plan of Treatment: Patient has history of CLL so wbc normally elevated.    Diagnosis: Legally blind  Assessment and Plan of Treatment: As well patient is legally blind so will need full assistance with all meals daily.     PRINCIPAL DISCHARGE DIAGNOSIS  Diagnosis: Closed nondisplaced intertrochanteric fracture of left femur, initial encounter  Assessment and Plan of Treatment: 89year old female legally blind is admitted for mechanical fall sustained Left hip intertrochanteric fracture and underwent Left hip intramedullary nail 2/13/2021 without any intraoperative complications.  Patient is doing well and stable for discharge.  Patient is tolerating physical therapy: weight bearing to Left leg, out of bed for gait training, and exercises as shown by Physical Therapy.  Keep wound dressing on as is until day of office visit.  Staples/sutures if applicable, to be removed in the office 14 days from date of surgery.  Patient is on Aspirin (MUST TAKE WITH FOOD) and Plavix for anticoagulation.  Orthopaedic Surgeon Dr. Menon would like patient to call private MD/Internist for appointment postop to maintain continuity of care.  Follow up with Dr. Menon's office in 1-2 weeks.      SECONDARY DISCHARGE DIAGNOSES  Diagnosis: Hyponatremia  Assessment and Plan of Treatment: Patient also seen by Nephrologist Dr. Chakraborty for hyponatremia (Na 125 2/23/2021): urine studies consistent with SIADH - improved as of today after one dose of Tolvaptan, Na131 2/24/2021  recommended  (1)Add NaCl 1gm po bid   (2)Continue free water restriction  (3)Repeat BMP 3-5 days after discharge; no renal objection to discontinuing salt tabs as outpatient if Na 130 or higher on outpatient bloodwork  follow up with   Josh Chakraborty MD  [facility]  Office: [phone]  Cell: [phone]    Diagnosis: Oral candidiasis  Assessment and Plan of Treatment: Patient was also seen by ENT for oral Candida: treated with oral nystatin and Magic mouthwash for 14day course...last day 3/8/2021, encourage lozenges to prevent tongue cracking from dryness.    Diagnosis: CLL (chronic lymphocytic leukemia)  Assessment and Plan of Treatment: Patient has history of CLL so wbc normally elevated.    Diagnosis: Legally blind  Assessment and Plan of Treatment: As well patient is legally blind so will need full assistance with all meals daily.    Per dietician evaluation:   1. Continue Ensure Enlive 1 can with lunch and dinner PO 2x daily (provides 350 kcal, 20 gm protein per 8oz serving).   2. RDN to provide Magic Cup ice cream 1 PO 2x daily (provides 290 kcal, 9 gm protein per 4oz serving).  3. Continue to provide assistance at meal times, encourage PO intake as tolerated.

## 2021-02-16 NOTE — PROGRESS NOTE ADULT - SUBJECTIVE AND OBJECTIVE BOX
Pt seen/examined. No acute complaints overnight.  Resting comfortably.    ICU Vital Signs Last 24 Hrs  T(C): 36.6 (16 Feb 2021 06:12), Max: 36.6 (16 Feb 2021 06:12)  T(F): 97.9 (16 Feb 2021 06:12), Max: 97.9 (16 Feb 2021 06:12)  HR: 78 (16 Feb 2021 06:12) (64 - 82)  BP: 138/49 (16 Feb 2021 06:12) (101/43 - 138/49)  BP(mean): --  ABP: --  ABP(mean): --  RR: 18 (16 Feb 2021 06:12) (17 - 18)  SpO2: 97% (16 Feb 2021 06:12) (95% - 100%)      Gen: awake, alert, NAD, laying in bed comfortably  Resp: no increased work of breathing  LLE:  Dressings c/d/i  +EHL/FHL/TA/GS  SILT S/S/SP/DP  +DP/PT Pulses  Compartments soft  No calf TTP     89yFemale s/p left hip IMN, progressing well.     - Pain control  - mechanical/DVT ppx  - OOB/PT  - FU AM labs  - Appreciate cardiology recs  - Dispo planning

## 2021-02-16 NOTE — DISCHARGE NOTE PROVIDER - NSDCCPTREATMENT_GEN_ALL_CORE_FT
PRINCIPAL PROCEDURE  Procedure: Intramedullary nailing for fracture of left femur  Findings and Treatment: dictated operative note  weight bearing as tolerated to Left leg  call Surgeon's office to make appointment for 1-2 weeks from date of surgery Dr. Menon [phone]

## 2021-02-16 NOTE — DISCHARGE NOTE PROVIDER - HOSPITAL COURSE
89year old female legally blind is admitted for mechanical fall sustained Left hip intertrochanteric fracture and underwent Left hip intramedullary nail 2/13/2021 without any intraoperative complications.  Patient is doing well and stable for discharge.  Patient is tolerating physical therapy: weight bearing to Left leg, out of bed for gait training, and exercises as shown by Physical Therapy.  Keep wound dressing on as is until day of office visit.  Staples/sutures if applicable, to be removed in the office 14 days from date of surgery.  Patient is on Aspirin (MUST TAKE WITH FOOD) and Plavix for anticoagulation.  Orthopaedic Surgeon Dr. Menon would like patient to call private MD/Internist for appointment postop to maintain continuity of care.  Follow up with Dr. Menon's office in 1-2 weeks.   89year old female legally blind is admitted for mechanical fall sustained Left hip intertrochanteric fracture and underwent Left hip intramedullary nail 2/13/2021 without any intraoperative complications.  Patient is doing well and stable for discharge.  Patient is tolerating physical therapy: weight bearing to Left leg, out of bed for gait training, and exercises as shown by Physical Therapy.  Keep wound dressing on as is until day of office visit.  Staples/sutures if applicable, to be removed in the office 14 days from date of surgery.  Patient is on Aspirin (MUST TAKE WITH FOOD) and Plavix for anticoagulation.  Patient was seen by hospitalist for co-management of medical aspects...patient's home med amlodipine is held during hospitalization as BP was well controlled, and hospitalist recommendation is to resume amlodipine at rehab when SBP>140.  Orthopaedic Surgeon Dr. Menon would like patient to call private MD/Internist for appointment postop to maintain continuity of care.  Follow up with Dr. Menon's office in 1-2 weeks.   89year old female legally blind is admitted for mechanical fall sustained Left hip intertrochanteric fracture and underwent Left hip intramedullary nail 2/13/2021 without any intraoperative complications.  Patient is doing well and stable for discharge.  Patient is tolerating physical therapy: weight bearing to Left leg, out of bed for gait training, and exercises as shown by Physical Therapy.  Keep wound dressing on as is until day of office visit.  Staples/sutures if applicable, to be removed in the office 14 days from date of surgery.  Patient is on Aspirin (MUST TAKE WITH FOOD) and Plavix for anticoagulation.  Orthopaedic Surgeon Dr. Menon would like patient to call private MD/Internist for appointment postop to maintain continuity of care.  Follow up with Dr. Menon's office in 1-2 weeks.    Patient was seen by hospitalist for co-management of medical aspects...patient's home med amlodipine is held during hospitalization as BP was well controlled, and hospitalist recommendation is to resume amlodipine at rehab when SBP>140.      Patient also seen by Nephrologist Dr. Chakraborty for hyponatremia. 89year old female legally blind is admitted for mechanical fall sustained Left hip intertrochanteric fracture and underwent Left hip intramedullary nail 2/13/2021 without any intraoperative complications.  Patient is doing well and stable for discharge.  Patient is tolerating physical therapy: weight bearing to Left leg, out of bed for gait training, and exercises as shown by Physical Therapy.  Keep wound dressing on as is until day of office visit.  Staples/sutures if applicable, to be removed in the office 14 days from date of surgery.  Patient is on Aspirin (MUST TAKE WITH FOOD) and Plavix for anticoagulation.  Orthopaedic Surgeon Dr. Menon would like patient to call private MD/Internist for appointment postop to maintain continuity of care.  Follow up with Dr. Menon's office in 1-2 weeks.    Patient was seen by hospitalist for co-management of medical aspects...patient's home med amlodipine is held during hospitalization as BP was well controlled, and hospitalist recommends to discontinue home med amlodipine as blood pressure well controlled without amlodipine.  As well patient is legally blind so will need full assistance with all meals daily.    Patient has history of CLL so wbc normally elevated.    Patient was also seen by ENT for oral Candida: treated with oral nystatin and Magic mouthwash for 14day course...last day 3/8/2021, encourage lozenges to prevent tongue cracking from dryness.    Patient also seen by Nephrologist Dr. Chakraborty for hyponatremia (Na 125 2/23/2021): urine studies consistent with SIADH - improved as of today after one dose of Tolvaptan, Na131 2/24/2021  recommended  (1)Add NaCl 1gm po bid   (2)Continue free water restriction  (3)Repeat BMP 3-5 days after discharge; no renal objection to discontinuing salt tabs as outpatient if Na 130 or higher on outpatient bloodwork  follow up with   Josh Chakraborty MD  [facility]  Office: [phone]  Cell: [phone] 89year old female legally blind is admitted for mechanical fall sustained Left hip intertrochanteric fracture and underwent Left hip intramedullary nail 2/13/2021 without any intraoperative complications.  Patient is doing well and stable for discharge.  Patient is tolerating physical therapy: weight bearing to Left leg, out of bed for gait training, and exercises as shown by Physical Therapy.  Keep wound dressing on as is until day of office visit.  Staples/sutures if applicable, to be removed in the office 14 days from date of surgery.  Patient is on Aspirin (MUST TAKE WITH FOOD) and Plavix for anticoagulation.  Orthopaedic Surgeon Dr. Menon would like patient to call private MD/Internist for appointment postop to maintain continuity of care.  Follow up with Dr. Menon's office in 1-2 weeks.    Patient was seen by hospitalist for co-management of medical aspects...patient's home med amlodipine is held during hospitalization as BP was well controlled, and hospitalist discontinued home med amlodipine as blood pressure well controlled without amlodipine.    ***As well patient is legally blind so will need full assistance with all meals daily.***  Per dietician evaluation:   1. Continue Ensure Enlive 1 can with lunch and dinner PO 2x daily (provides 350 kcal, 20 gm protein per 8oz serving).   2. RDN to provide Magic Cup ice cream 1 PO 2x daily (provides 290 kcal, 9 gm protein per 4oz serving).  3. Continue to provide assistance at meal times, encourage PO intake as tolerated.      Patient has history of CLL so wbc normally elevated.    Patient was also seen by ENT for oral Candida: treated with oral nystatin and Magic mouthwash for 14day course...last day 3/8/2021, encourage lozenges to prevent tongue cracking from dryness.    Patient also seen by Nephrologist Dr. Chakraborty for hyponatremia (Na 125 2/23/2021): urine studies consistent with SIADH - improved as of today after one dose of Tolvaptan, Na131 2/24/2021  recommended  (1)Add NaCl 1gm po bid   (2)Continue free water restriction  (3)Repeat BMP 3-5 days after discharge; no renal objection to discontinuing salt tabs as outpatient if Na 130 or higher on outpatient bloodwork  follow up with   Josh Chakraborty MD  [facility]  Office: [phone]  Cell: [phone]

## 2021-02-16 NOTE — PROGRESS NOTE ADULT - SUBJECTIVE AND OBJECTIVE BOX
Kat Baker MD  [facility] Division of Hospital Medicine  Pager [phone] (M-F 8AM-5PM)  Other Times: [phone]    Patient is a 89y old  Female who presents with a chief complaint of left intertrochanteric fracture (16 Feb 2021 11:31)    SUBJECTIVE / OVERNIGHT EVENTS: no complaints this morning     MEDICATIONS  (STANDING):  acetaminophen   Tablet .. 975 milliGRAM(s) Oral every 8 hours  aspirin enteric coated 81 milliGRAM(s) Oral daily  citalopram 20 milliGRAM(s) Oral daily  clopidogrel Tablet 75 milliGRAM(s) Oral daily  cyanocobalamin 1000 MICROGram(s) Oral daily  gabapentin 100 milliGRAM(s) Oral every 8 hours  isosorbide   mononitrate ER Tablet (IMDUR) 30 milliGRAM(s) Oral daily  melatonin 3 milliGRAM(s) Oral at bedtime  metoprolol succinate  milliGRAM(s) Oral daily  pantoprazole    Tablet 40 milliGRAM(s) Oral before breakfast  ranolazine 500 milliGRAM(s) Oral daily  senna 2 Tablet(s) Oral at bedtime  simvastatin 20 milliGRAM(s) Oral at bedtime    MEDICATIONS  (PRN):  bisacodyl Suppository 10 milliGRAM(s) Rectal daily PRN If no bowel movement by POD#2  fentaNYL    Injectable 25 MICROGram(s) IV Push every 15 minutes PRN Severe Pain (7 - 10)  ketorolac   Injectable 15 milliGRAM(s) IV Push every 6 hours PRN Severe Breakthrough Pain  magnesium hydroxide Suspension 30 milliLiter(s) Oral daily PRN Constipation  ondansetron Injectable 4 milliGRAM(s) IV Push once PRN Nausea and/or Vomiting  traMADol 25 milliGRAM(s) Oral every 4 hours PRN Moderate Pain (4 - 6)  traMADol 50 milliGRAM(s) Oral every 4 hours PRN Severe Pain (7 - 10)      PHYSICAL EXAM:  Vital Signs Last 24 Hrs  T(C): 36.7 (16 Feb 2021 09:56), Max: 36.7 (16 Feb 2021 09:56)  T(F): 98.1 (16 Feb 2021 09:56), Max: 98.1 (16 Feb 2021 09:56)  HR: 75 (16 Feb 2021 09:56) (64 - 82)  BP: 140/54 (16 Feb 2021 09:56) (112/45 - 140/54)  RR: 18 (16 Feb 2021 09:56) (18 - 18)  SpO2: 96% (16 Feb 2021 09:56) (95% - 100%)    CONSTITUTIONAL: NAD, thin, frail, elderly female  Heent: eyes closed, legally blind   RESPIRATORY: Normal respiratory effort; decreased BS R lung base  CARDIOVASCULAR: Regular rate and rhythm, normal S1 and S2, 3/6 WINSOME   No lower extremity edema; b/l pedal pulse dopplerable  ABDOMEN: Nontender to palpation, normoactive bowel sounds, no rebound/guarding; No hepatosplenomegaly  MUSCULOSKELETAL: LLE shortened and externally rotated  PSYCH: A+O to person, place; affect appropriate  Skin: L hip bandage, C/D/I    LABS:                        10.2   10.77 )-----------( 189      ( 16 Feb 2021 07:46 )             34.4     02-16    129<L>  |  97<L>  |  20  ----------------------------<  83  3.8   |  22  |  0.48<L>    Ca    7.8<L>      16 Feb 2021 07:46        CARDIAC MARKERS ( 15 Feb 2021 03:04 )  x     / x     / 69 U/L / x     / 3.9 ng/mL            RADIOLOGY & ADDITIONAL TESTS:  Results Reviewed:   Imaging Personally Reviewed:  Electrocardiogram Personally Reviewed:    COORDINATION OF CARE:  Care Discussed with Consultants/Other Providers [Y/N]:  Prior or Outpatient Records Reviewed [Y/N]:

## 2021-02-16 NOTE — PROGRESS NOTE ADULT - PROBLEM SELECTOR PLAN 5
H/H stable  - likely post op changes, s/p total 2U PRBC this admission post op  - trend CBC and transfuse prn

## 2021-02-16 NOTE — CONSULT NOTE ADULT - SUBJECTIVE AND OBJECTIVE BOX
Patient is a 89y old  Female who presents with a chief complaint of left intertrochanteric fracture (16 Feb 2021 10:34)      HPI:  89 y/o Female A&O x3, legally blind with PMH of CAD S/P stent placement, CHB S/P PPM placement, carotid stenosis s/p carotid endarterectomy, HTN, HLD, CLL, s/p intramedullary nailing of right/left femur Right hip in 2012 with Dr. Blake presents to [facility] with c/o L hip pain s/p unwitnessed mechanical fall.  At baseline, pt ambulates with walker and assistance from home aide.   Patient was found to have left femoral IT fx.  Now s/p L femur IM nail on 2/13/21.       REVIEW OF SYSTEMS  Constitutional - No fever, No weight loss, No fatigue  HEENT - No eye pain, No visual disturbances, No difficulty hearing, No tinnitus, No vertigo, No neck pain  Respiratory - No cough, No wheezing, No shortness of breath  Cardiovascular - No chest pain, No palpitations  Gastrointestinal - No abdominal pain, No nausea, No vomiting, No diarrhea, No constipation  Genitourinary - No dysuria, No frequency, No hematuria, No incontinence  Neurological - No headaches, No memory loss, + loss of strength, No numbness, No tremors  Skin - No itching, No rashes, No lesions   Endocrine - No temperature intolerance  Musculoskeletal - No joint pain, No joint swelling, No muscle pain  Psychiatric - No depression, No anxiety    PAST MEDICAL & SURGICAL HISTORY  Legally blind  Complete heart block  Hyperlipemia  Hypertension  Pulmonary embolism  Angina pectoris, unstable  Cancer of Uterus  Knee Problem  Osteoarthritis  Cataract  Osteoporosis  Glaucoma, Narrow-Angle  Dyslipidemia  CLL (Chronic Lymphoblastic Yosef  Bradyarrhythmia  Coronary Artery Disease  HTN  Closed fracture of femur  H/O bilateral cataract extraction  History of total abdominal hysterectomy  Retina  baerveldt implant left eye  History of Carotid Endarterectomy  Unspecified Cataract  Coronary Stent  History of Arthroscopic Knee S  History of Hysterectomy  Cardiac Pacemaker  Dystrophy, Cornea  History of Tonsillectomy        SOCIAL HISTORY  Smoking - Denied  EtOH - Denied   Drugs - Denied    FUNCTIONAL HISTORY  Lives with sister and niece in 2 family house.   has HHA 10 hrs/day, 5 days per week and 8 hrs/day 2 days per week.  5 steps.        CURRENT FUNCTIONAL STATUS  2/15    Bed Mobility  Bed Mobility Training Rehab Potential: fair, will monitor progress closely  Bed Mobility Training Scooting: maximum assist (25% patient effort);  1 person assist;  nonverbal cues (demo/gestures);  verbal cues;  set-up required;  hand over hand  Bed Mobility Training Sit-to-Supine: maximum assist (25% patient effort);  1 person assist;  verbal cues;  nonverbal cues (demo/gestures);  hand over hand;  set-up required  Bed Mobility Training Supine-to-Sit: maximum assist (25% patient effort);  1 person assist;  nonverbal cues (demo/gestures);  verbal cues;  set-up required;  hand over hand  Bed Mobility Training Limitations: decreased ability to use legs for bridging/pushing;  impaired ability to control trunk for mobility;  decreased ROM;  decreased flexibility;  decreased strength;  impaired balance;  impaired postural control    Therapeutic Exercise  Therapeutic Exercise Rehab Effort: fair  Therapeutic Exercise Detail: Ther ex of LE included ankle pumps, knee extension, hip flexion. UE ther ex included shoulder flexion and elbow flexion. 2 sets of 10 performed of each bilaterally.       FAMILY HISTORY   No pertinent family history in first degree relatives        RECENT LABS/IMAGING  CBC Full  -  ( 16 Feb 2021 07:46 )  WBC Count : 10.77 K/uL  RBC Count : 3.45 M/uL  Hemoglobin : 10.2 g/dL  Hematocrit : 34.4 %  Platelet Count - Automated : 189 K/uL  Mean Cell Volume : 99.7 fL  Mean Cell Hemoglobin : 29.6 pg  Mean Cell Hemoglobin Concentration : 29.7 gm/dL  Auto Neutrophil # : x  Auto Lymphocyte # : x  Auto Monocyte # : x  Auto Eosinophil # : x  Auto Basophil # : x  Auto Neutrophil % : x  Auto Lymphocyte % : x  Auto Monocyte % : x  Auto Eosinophil % : x  Auto Basophil % : x    02-16    129<L>  |  97<L>  |  20  ----------------------------<  83  3.8   |  22  |  0.48<L>    Ca    7.8<L>      16 Feb 2021 07:46          VITALS  T(C): 36.7 (02-16-21 @ 09:56), Max: 36.7 (02-16-21 @ 09:56)  HR: 75 (02-16-21 @ 09:56) (64 - 82)  BP: 140/54 (02-16-21 @ 09:56) (112/45 - 140/54)  RR: 18 (02-16-21 @ 09:56) (18 - 18)  SpO2: 96% (02-16-21 @ 09:56) (95% - 100%)  Wt(kg): --    ALLERGIES  morphine (Other)  oxycodone (Unknown)  Percocet 5/325 (Unknown)  pilocarpine (Other)      MEDICATIONS   acetaminophen   Tablet .. 975 milliGRAM(s) Oral every 8 hours  aspirin enteric coated 81 milliGRAM(s) Oral daily  bisacodyl Suppository 10 milliGRAM(s) Rectal daily PRN  citalopram 20 milliGRAM(s) Oral daily  clopidogrel Tablet 75 milliGRAM(s) Oral daily  cyanocobalamin 1000 MICROGram(s) Oral daily  fentaNYL    Injectable 25 MICROGram(s) IV Push every 15 minutes PRN  gabapentin 100 milliGRAM(s) Oral every 8 hours  isosorbide   mononitrate ER Tablet (IMDUR) 30 milliGRAM(s) Oral daily  ketorolac   Injectable 15 milliGRAM(s) IV Push every 6 hours PRN  magnesium hydroxide Suspension 30 milliLiter(s) Oral daily PRN  melatonin 3 milliGRAM(s) Oral at bedtime  metoprolol succinate  milliGRAM(s) Oral daily  ondansetron Injectable 4 milliGRAM(s) IV Push once PRN  pantoprazole    Tablet 40 milliGRAM(s) Oral before breakfast  ranolazine 500 milliGRAM(s) Oral daily  senna 2 Tablet(s) Oral at bedtime  simvastatin 20 milliGRAM(s) Oral at bedtime  traMADol 25 milliGRAM(s) Oral every 4 hours PRN  traMADol 50 milliGRAM(s) Oral every 4 hours PRN      ----------------------------------------------------------------------------------------  PHYSICAL EXAM  Constitutional - NAD, Comfortable  HEENT - NCAT, EOMI  Neck - Supple, No limited ROM  Chest - CTA bilaterally, No wheeze, No rhonchi, No crackles  Cardiovascular - RRR, S1S2, No murmurs  Abdomen - BS+, Soft, NTND  Extremities - No C/C/E, No calf tenderness   Neurologic Exam -                    Cognitive - Awake, Alert, AAO to self, place, date, year, situation     Communication - Fluent, No dysarthria     Cranial Nerves - CN 2-12 intact     Motor - No focal deficits                    LEFT    UE - ShAB 5/5, EF 5/5, EE 5/5, WE 5/5,  5/5                    RIGHT UE - ShAB 5/5, EF 5/5, EE 5/5, WE 5/5,  5/5                    LEFT    LE - HF 5/5, KE 5/5, DF 5/5, PF 5/5                    RIGHT LE - HF 5/5, KE 5/5, DF 5/5, PF 5/5        Sensory - Intact to LT     Reflexes - DTR Intact, No primitive reflexive     Coordination - FTN intact     OculoVestibular - No saccades, No nystagmus, VOR         Balance - WNL Static  Psychiatric - Mood stable, Affect WNL  ----------------------------------------------------------------------------------------  ASSESSMENT/PLAN  89 yo f s/p fall, now s/p L femur IM nail for IT fx.    Pain - has order for fentanyl iv prn, tylenol prn  DVT PPX -   Rehab -  Patient is a 89y old  Female who presents with a chief complaint of left intertrochanteric fracture (16 Feb 2021 10:34)      HPI:  89 y/o Female A&O x3, legally blind with PMH of CAD S/P stent placement, CHB S/P PPM placement, carotid stenosis s/p carotid endarterectomy, HTN, HLD, CLL, s/p intramedullary nailing of right/left femur Right hip in 2012 with Dr. Blake presents to [facility] with c/o L hip pain s/p unwitnessed mechanical fall.  At baseline, pt ambulates with walker and assistance from home aide.   Patient was found to have left femoral IT fx.  Now s/p L femur IM nail on 2/13/21.       REVIEW OF SYSTEMS  Constitutional - No fever, No weight loss, No fatigue  HEENT - No eye pain, No visual disturbances, No difficulty hearing, No tinnitus, No vertigo, No neck pain  Respiratory - No cough, No wheezing, No shortness of breath  Cardiovascular - No chest pain, No palpitations  Gastrointestinal - No abdominal pain, No nausea, No vomiting, No diarrhea, No constipation  Genitourinary - No dysuria, No frequency, No hematuria, No incontinence  Neurological - No headaches, No memory loss, + loss of strength, No numbness, No tremors  Skin - No itching, No rashes, No lesions   Endocrine - No temperature intolerance  Musculoskeletal - + pain  Psychiatric - No depression, No anxiety    PAST MEDICAL & SURGICAL HISTORY  Legally blind  Complete heart block  Hyperlipemia  Hypertension  Pulmonary embolism  Angina pectoris, unstable  Cancer of Uterus  Knee Problem  Osteoarthritis  Cataract  Osteoporosis  Glaucoma, Narrow-Angle  Dyslipidemia  CLL (Chronic Lymphoblastic Yosef  Bradyarrhythmia  Coronary Artery Disease  HTN  Closed fracture of femur  H/O bilateral cataract extraction  History of total abdominal hysterectomy  Retina  baerveldt implant left eye  History of Carotid Endarterectomy  Unspecified Cataract  Coronary Stent  History of Arthroscopic Knee S  History of Hysterectomy  Cardiac Pacemaker  Dystrophy, Cornea  History of Tonsillectomy    FUNCTIONAL HISTORY  Lives with sister and niece in 2 family house.   has HHA 10 hrs/day, 5 days per week and 8 hrs/day 2 days per week.  5 steps.        CURRENT FUNCTIONAL STATUS  2/15    Bed Mobility  Bed Mobility Training Rehab Potential: fair, will monitor progress closely  Bed Mobility Training Scooting: maximum assist (25% patient effort);  1 person assist;  nonverbal cues (demo/gestures);  verbal cues;  set-up required;  hand over hand  Bed Mobility Training Sit-to-Supine: maximum assist (25% patient effort);  1 person assist;  verbal cues;  nonverbal cues (demo/gestures);  hand over hand;  set-up required  Bed Mobility Training Supine-to-Sit: maximum assist (25% patient effort);  1 person assist;  nonverbal cues (demo/gestures);  verbal cues;  set-up required;  hand over hand  Bed Mobility Training Limitations: decreased ability to use legs for bridging/pushing;  impaired ability to control trunk for mobility;  decreased ROM;  decreased flexibility;  decreased strength;  impaired balance;  impaired postural control    Therapeutic Exercise  Therapeutic Exercise Rehab Effort: fair  Therapeutic Exercise Detail: Ther ex of LE included ankle pumps, knee extension, hip flexion. UE ther ex included shoulder flexion and elbow flexion. 2 sets of 10 performed of each bilaterally.       FAMILY HISTORY   No pertinent family history in first degree relatives        RECENT LABS/IMAGING  CBC Full  -  ( 16 Feb 2021 07:46 )  WBC Count : 10.77 K/uL  RBC Count : 3.45 M/uL  Hemoglobin : 10.2 g/dL  Hematocrit : 34.4 %  Platelet Count - Automated : 189 K/uL  Mean Cell Volume : 99.7 fL  Mean Cell Hemoglobin : 29.6 pg  Mean Cell Hemoglobin Concentration : 29.7 gm/dL  Auto Neutrophil # : x  Auto Lymphocyte # : x  Auto Monocyte # : x  Auto Eosinophil # : x  Auto Basophil # : x  Auto Neutrophil % : x  Auto Lymphocyte % : x  Auto Monocyte % : x  Auto Eosinophil % : x  Auto Basophil % : x    02-16    129<L>  |  97<L>  |  20  ----------------------------<  83  3.8   |  22  |  0.48<L>    Ca    7.8<L>      16 Feb 2021 07:46          VITALS  T(C): 36.7 (02-16-21 @ 09:56), Max: 36.7 (02-16-21 @ 09:56)  HR: 75 (02-16-21 @ 09:56) (64 - 82)  BP: 140/54 (02-16-21 @ 09:56) (112/45 - 140/54)  RR: 18 (02-16-21 @ 09:56) (18 - 18)  SpO2: 96% (02-16-21 @ 09:56) (95% - 100%)  Wt(kg): --    ALLERGIES  morphine (Other)  oxycodone (Unknown)  Percocet 5/325 (Unknown)  pilocarpine (Other)      MEDICATIONS   acetaminophen   Tablet .. 975 milliGRAM(s) Oral every 8 hours  aspirin enteric coated 81 milliGRAM(s) Oral daily  bisacodyl Suppository 10 milliGRAM(s) Rectal daily PRN  citalopram 20 milliGRAM(s) Oral daily  clopidogrel Tablet 75 milliGRAM(s) Oral daily  cyanocobalamin 1000 MICROGram(s) Oral daily  fentaNYL    Injectable 25 MICROGram(s) IV Push every 15 minutes PRN  gabapentin 100 milliGRAM(s) Oral every 8 hours  isosorbide   mononitrate ER Tablet (IMDUR) 30 milliGRAM(s) Oral daily  ketorolac   Injectable 15 milliGRAM(s) IV Push every 6 hours PRN  magnesium hydroxide Suspension 30 milliLiter(s) Oral daily PRN  melatonin 3 milliGRAM(s) Oral at bedtime  metoprolol succinate  milliGRAM(s) Oral daily  ondansetron Injectable 4 milliGRAM(s) IV Push once PRN  pantoprazole    Tablet 40 milliGRAM(s) Oral before breakfast  ranolazine 500 milliGRAM(s) Oral daily  senna 2 Tablet(s) Oral at bedtime  simvastatin 20 milliGRAM(s) Oral at bedtime  traMADol 25 milliGRAM(s) Oral every 4 hours PRN  traMADol 50 milliGRAM(s) Oral every 4 hours PRN      ----------------------------------------------------------------------------------------  PHYSICAL EXAM  Constitutional - NAD, Comfortable  HEENT - NCAT,  blindness  Chest -no respiratory distress  Cardiovascular - S1S2-  Extremities - No C/C/E, No calf tenderness   Neurologic Exam -                    Cognitive - Awake, Alert, AAO to self, reports she is at home, unsure of the year, states 21 or 22     Communication - Fluent, No dysarthria      Motor -                      LEFT    UE -4/5                    RIGHT UE - 4/5                    LEFT    LE - 0/5 (limited by pain)                    RIGHT LE- 4/5     Sensory - Intact to LT          Balance - WNL Static  Psychiatric - Mood stable, Affect WNL  ----------------------------------------------------------------------------------------  ASSESSMENT/PLAN  89 yo f s/p fall, now s/p L femur IM nail for IT fx.    Pain - has order for fentanyl iv prn not received in over 24 hours, tordal prn (not received in over 24 hours), tramadol, tylenol prn, gabapentin  DVT PPX - scd, on asa  nursing ROM to prevent skin breakdown  patient needs assistance with meals due to blindness and UE weakness  continue bedside therapy for ROM, bed mobility, transfers, ambulation with assistive device, ADLs  Rehab - recommend subacute rehab when medically cleared

## 2021-02-16 NOTE — PROGRESS NOTE ADULT - PROBLEM SELECTOR PLAN 3
s/p PCI  - resumed plavix 75 mg qd postop   - home meds adjusted: c/w toprol, reduced imdur to 30mg, d/c norvasc d/t soft bp, c/w ranexa for chronic angina  - pt has chronic intermittent chest pain, no further work up per cards

## 2021-02-17 ENCOUNTER — TRANSCRIPTION ENCOUNTER (OUTPATIENT)
Age: 86
End: 2021-02-17

## 2021-02-17 LAB
ANION GAP SERPL CALC-SCNC: 11 MMOL/L — SIGNIFICANT CHANGE UP (ref 7–14)
BUN SERPL-MCNC: 16 MG/DL — SIGNIFICANT CHANGE UP (ref 7–23)
CALCIUM SERPL-MCNC: 7.9 MG/DL — LOW (ref 8.4–10.5)
CHLORIDE SERPL-SCNC: 94 MMOL/L — LOW (ref 98–107)
CO2 SERPL-SCNC: 22 MMOL/L — SIGNIFICANT CHANGE UP (ref 22–31)
CREAT SERPL-MCNC: 0.46 MG/DL — LOW (ref 0.5–1.3)
GLUCOSE SERPL-MCNC: 91 MG/DL — SIGNIFICANT CHANGE UP (ref 70–99)
OSMOLALITY SERPL: 273 MOSM/KG — LOW (ref 275–295)
OSMOLALITY UR: 816 MOSM/KG — SIGNIFICANT CHANGE UP (ref 50–1200)
POTASSIUM SERPL-MCNC: 4.2 MMOL/L — SIGNIFICANT CHANGE UP (ref 3.5–5.3)
POTASSIUM SERPL-SCNC: 4.2 MMOL/L — SIGNIFICANT CHANGE UP (ref 3.5–5.3)
SODIUM SERPL-SCNC: 127 MMOL/L — LOW (ref 135–145)
SODIUM UR-SCNC: 75 MMOL/L — SIGNIFICANT CHANGE UP

## 2021-02-17 PROCEDURE — 99233 SBSQ HOSP IP/OBS HIGH 50: CPT

## 2021-02-17 RX ORDER — AMLODIPINE BESYLATE 2.5 MG/1
1 TABLET ORAL
Qty: 0 | Refills: 0 | DISCHARGE

## 2021-02-17 RX ORDER — TRAMADOL HYDROCHLORIDE 50 MG/1
1 TABLET ORAL
Qty: 0 | Refills: 0 | DISCHARGE
Start: 2021-02-17

## 2021-02-17 RX ORDER — ASCORBIC ACID 60 MG
500 TABLET,CHEWABLE ORAL DAILY
Refills: 0 | Status: DISCONTINUED | OUTPATIENT
Start: 2021-02-17 | End: 2021-02-24

## 2021-02-17 RX ORDER — SODIUM CHLORIDE 9 MG/ML
1000 INJECTION INTRAMUSCULAR; INTRAVENOUS; SUBCUTANEOUS
Refills: 0 | Status: DISCONTINUED | OUTPATIENT
Start: 2021-02-17 | End: 2021-02-17

## 2021-02-17 RX ORDER — PANTOPRAZOLE SODIUM 20 MG/1
1 TABLET, DELAYED RELEASE ORAL
Qty: 0 | Refills: 0 | DISCHARGE
Start: 2021-02-17

## 2021-02-17 RX ORDER — HALOPERIDOL DECANOATE 100 MG/ML
0.25 INJECTION INTRAMUSCULAR ONCE
Refills: 0 | Status: DISCONTINUED | OUTPATIENT
Start: 2021-02-17 | End: 2021-02-19

## 2021-02-17 RX ORDER — CITALOPRAM 10 MG/1
1 TABLET, FILM COATED ORAL
Qty: 0 | Refills: 0 | DISCHARGE
Start: 2021-02-17

## 2021-02-17 RX ORDER — ASCORBIC ACID 60 MG
1 TABLET,CHEWABLE ORAL
Qty: 0 | Refills: 0 | DISCHARGE
Start: 2021-02-17

## 2021-02-17 RX ORDER — TRAMADOL HYDROCHLORIDE 50 MG/1
0.5 TABLET ORAL
Qty: 0 | Refills: 0 | DISCHARGE
Start: 2021-02-17

## 2021-02-17 RX ORDER — RANOLAZINE 500 MG/1
1 TABLET, FILM COATED, EXTENDED RELEASE ORAL
Qty: 0 | Refills: 0 | DISCHARGE
Start: 2021-02-17

## 2021-02-17 RX ORDER — SENNA PLUS 8.6 MG/1
2 TABLET ORAL
Qty: 0 | Refills: 0 | DISCHARGE
Start: 2021-02-17

## 2021-02-17 RX ORDER — ASPIRIN/CALCIUM CARB/MAGNESIUM 324 MG
1 TABLET ORAL
Qty: 0 | Refills: 0 | DISCHARGE
Start: 2021-02-17

## 2021-02-17 RX ORDER — ACETAMINOPHEN 500 MG
3 TABLET ORAL
Qty: 0 | Refills: 0 | DISCHARGE
Start: 2021-02-17

## 2021-02-17 RX ORDER — LANOLIN ALCOHOL/MO/W.PET/CERES
1 CREAM (GRAM) TOPICAL
Qty: 0 | Refills: 0 | DISCHARGE
Start: 2021-02-17

## 2021-02-17 RX ORDER — GABAPENTIN 400 MG/1
1 CAPSULE ORAL
Qty: 0 | Refills: 0 | DISCHARGE
Start: 2021-02-17

## 2021-02-17 RX ADMIN — Medication 975 MILLIGRAM(S): at 22:19

## 2021-02-17 RX ADMIN — RANOLAZINE 500 MILLIGRAM(S): 500 TABLET, FILM COATED, EXTENDED RELEASE ORAL at 14:29

## 2021-02-17 RX ADMIN — Medication 3 MILLIGRAM(S): at 22:19

## 2021-02-17 RX ADMIN — Medication 975 MILLIGRAM(S): at 06:36

## 2021-02-17 RX ADMIN — Medication 100 MILLIGRAM(S): at 06:37

## 2021-02-17 RX ADMIN — CLOPIDOGREL BISULFATE 75 MILLIGRAM(S): 75 TABLET, FILM COATED ORAL at 14:30

## 2021-02-17 RX ADMIN — GABAPENTIN 100 MILLIGRAM(S): 400 CAPSULE ORAL at 06:36

## 2021-02-17 RX ADMIN — PANTOPRAZOLE SODIUM 40 MILLIGRAM(S): 20 TABLET, DELAYED RELEASE ORAL at 06:36

## 2021-02-17 RX ADMIN — SIMVASTATIN 20 MILLIGRAM(S): 20 TABLET, FILM COATED ORAL at 22:19

## 2021-02-17 RX ADMIN — Medication 81 MILLIGRAM(S): at 14:30

## 2021-02-17 NOTE — DISCHARGE NOTE NURSING/CASE MANAGEMENT/SOCIAL WORK - NSDPDISTO_GEN_ALL_CORE
Skilled Nursing Facility stable, left hip dressing in place clean dry and intact, tolerating diet, enlive supplements voiding continent at times using primafit at night, oob with asssit/Sub-Acute rehab

## 2021-02-17 NOTE — OCCUPATIONAL THERAPY INITIAL EVALUATION ADULT - FINE MOTOR COORDINATION EXAM
Unable to formally assess due to patient presenting with decreased ability to follow commands + decreased vision

## 2021-02-17 NOTE — CHART NOTE - NSCHARTNOTEFT_GEN_A_CORE
Discussed patient Urine and Serum Osmolality with Dr Damon. 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

## 2021-02-17 NOTE — DISCHARGE NOTE NURSING/CASE MANAGEMENT/SOCIAL WORK - NSDCPECAREGIVERED_GEN_ALL_CORE
hip fracture folder sent with pt for family reference, pt is blind/No fracture hip folder for family reference/Yes

## 2021-02-17 NOTE — PROGRESS NOTE ADULT - SUBJECTIVE AND OBJECTIVE BOX
Pt seen/examined. Doing well. Pain controlled. No acute overnight complaints or events.    T(C): 36.4 (02-16-21 @ 21:20), Max: 36.7 (02-16-21 @ 09:56)  HR: 65 (02-16-21 @ 21:20) (65 - 77)  BP: 102/50 (02-16-21 @ 21:20) (102/50 - 140/54)  RR: 18 (02-16-21 @ 21:20) (18 - 18)  SpO2: 96% (02-16-21 @ 21:20) (96% - 96%)  Wt(kg): --                          10.2   10.77 )-----------( 189      ( 16 Feb 2021 07:46 )             34.4       02-16    129<L>  |  97<L>  |  20  ----------------------------<  83  3.8   |  22  |  0.48<L>            Gen: awake, alert, NAD  Resp: no increased work of breathing  LLE:  Dressing c/d/i   +EHL/FHL/TA/GS  SILT S/S/SP/DP  +DP/PT Pulses  Compartments soft  No calf TTP     89yFemale s/p L hip IMN. POD 4     - Pain control  - mechanical/DVT ppx  - OOB/PT  - FU AM labs  - Dispo planning

## 2021-02-17 NOTE — DISCHARGE NOTE NURSING/CASE MANAGEMENT/SOCIAL WORK - NSDCPNINST_GEN_ALL_CORE
Notify Dr Menon if you experience any increase in pain not relieved with medication, any redness, drainage or swelling around incision or any fever >100.5.  No heavy lifting or straining.  Notify Dr Menon if you experience any increase in pain not relieved with medication, any redness, drainage or swelling around incision or any fever >100.5.  No heavy lifting or straining.  1000cc fluid restriction

## 2021-02-17 NOTE — DISCHARGE NOTE NURSING/CASE MANAGEMENT/SOCIAL WORK - PATIENT PORTAL LINK FT
You can access the FollowMyHealth Patient Portal offered by [facility] by registering at the following website: [email or web address]. By joining [employer]’s FollowMyHealth portal, you will also be able to view your health information using other applications (apps) compatible with our system.

## 2021-02-17 NOTE — OCCUPATIONAL THERAPY INITIAL EVALUATION ADULT - GENERAL OBSERVATIONS, REHAB EVAL
Patient received semisupine in bed in NAD. + Prima fit. Per RN Sirena, patient okay to participate in OT evaluation. Patient A&Ox1, able to follow simple commands.

## 2021-02-17 NOTE — OCCUPATIONAL THERAPY INITIAL EVALUATION ADULT - DIAGNOSIS, OT EVAL
s/p fall, s/p left hip IMN; decreased functional mobility, decreased ADL performance, decreased ability to follow commands

## 2021-02-17 NOTE — PROGRESS NOTE ADULT - SUBJECTIVE AND OBJECTIVE BOX
[facility] Division of Hospital Medicine  Wiliam Damon MD  Pager (M-F, 8A-5P): [phone]  Other Times:  [phone]    Patient is a 89y old  Female who presents with a chief complaint of left intertrochanteric fracture (17 Feb 2021 06:18)    SUBJECTIVE / OVERNIGHT EVENTS:  Patient unable to make her medical needs known.  Cheerfully answers questions without any purpose but becomes extremely irate and physically combative when examined or repositioned for procedure.  Unable to get straight cath for urine due to patient's agitation.  No overnight issues with F/C, vomiting, SOB, Cough, diarrhea.    MEDICATIONS  (STANDING):  acetaminophen   Tablet .. 975 milliGRAM(s) Oral every 8 hours  ascorbic acid 500 milliGRAM(s) Oral daily  aspirin enteric coated 81 milliGRAM(s) Oral daily  calcium carbonate 1250 mG  + Vitamin D (OsCal 500 + D) 1 Tablet(s) Oral daily  citalopram 20 milliGRAM(s) Oral daily  clopidogrel Tablet 75 milliGRAM(s) Oral daily  cyanocobalamin 1000 MICROGram(s) Oral daily  haloperidol    Injectable 0.25 milliGRAM(s) IV Push once  isosorbide   mononitrate ER Tablet (IMDUR) 30 milliGRAM(s) Oral daily  melatonin 3 milliGRAM(s) Oral at bedtime  metoprolol succinate  milliGRAM(s) Oral daily  pantoprazole    Tablet 40 milliGRAM(s) Oral before breakfast  ranolazine 500 milliGRAM(s) Oral daily  senna 2 Tablet(s) Oral at bedtime  simvastatin 20 milliGRAM(s) Oral at bedtime    MEDICATIONS  (PRN):  bisacodyl Suppository 10 milliGRAM(s) Rectal daily PRN If no bowel movement by POD#2  fentaNYL    Injectable 25 MICROGram(s) IV Push every 15 minutes PRN Severe Pain (7 - 10)  ketorolac   Injectable 15 milliGRAM(s) IV Push every 6 hours PRN Severe Breakthrough Pain  magnesium hydroxide Suspension 30 milliLiter(s) Oral daily PRN Constipation  ondansetron Injectable 4 milliGRAM(s) IV Push once PRN Nausea and/or Vomiting  traMADol 25 milliGRAM(s) Oral every 4 hours PRN Moderate Pain (4 - 6)  traMADol 50 milliGRAM(s) Oral every 4 hours PRN Severe Pain (7 - 10)      Vital Signs Last 24 Hrs  T(C): 36.7 (17 Feb 2021 09:12), Max: 36.7 (16 Feb 2021 18:36)  T(F): 98.1 (17 Feb 2021 09:12), Max: 98.1 (17 Feb 2021 09:12)  HR: 70 (17 Feb 2021 09:12) (65 - 73)  BP: 158/62 (17 Feb 2021 09:12) (102/50 - 164/68)  BP(mean): --  RR: 18 (17 Feb 2021 09:12) (18 - 18)  SpO2: 99% (17 Feb 2021 09:12) (96% - 99%)  CAPILLARY BLOOD GLUCOSE        I&O's Summary    16 Feb 2021 07:01  -  17 Feb 2021 07:00  --------------------------------------------------------  IN: 0 mL / OUT: 800 mL / NET: -800 mL    17 Feb 2021 07:01  -  17 Feb 2021 13:51  --------------------------------------------------------  IN: 0 mL / OUT: 200 mL / NET: -200 mL        PHYSICAL EXAM:  GENERAL: NAD, thin  HEAD:  Atraumatic, Normocephalic  EYES: EOMI, PERRLA, conjunctiva and sclera clear  NECK: Supple, No JVD  CHEST/LUNG: Clear to auscultation bilaterally; No wheeze  HEART: Regular rate and rhythm; No murmurs, rubs, or gallops  ABDOMEN: Soft, Nontender, Nondistended; Bowel sounds present  BACK: Non tender, ROM intact.  EXTREMITIES:  2+ Peripheral Pulses, No clubbing, cyanosis. LLE movement limited due to pain.  PSYCH: Cycling between calm and combativeness.  NEUROLOGY: AAOx1 - name - not  SKIN: Surgical dressing C/D/I    LABS:                        10.2   10.77 )-----------( 189      ( 16 Feb 2021 07:46 )             34.4     02-17    127<L>  |  94<L>  |  16  ----------------------------<  91  4.2   |  22  |  0.46<L>    Ca    7.9<L>      17 Feb 2021 07:49                RADIOLOGY & ADDITIONAL TESTS:    Imaging Personally Reviewed:    Care Discussed with Consultants/Other Providers:    Care Discussed with Orthopedic PA about:

## 2021-02-17 NOTE — OCCUPATIONAL THERAPY INITIAL EVALUATION ADULT - RANGE OF MOTION EXAMINATION, UPPER EXTREMITY
bilateral UE Active ROM was WFL  (within functional limits)/bilateral UE Active Assistive ROM was WFL  (within functional limits)

## 2021-02-17 NOTE — OCCUPATIONAL THERAPY INITIAL EVALUATION ADULT - PERTINENT HX OF CURRENT PROBLEM, REHAB EVAL
89 year old female with history of CAD s/p PCI, CHB s/p PPM, carotid stenosis s/p CEA, HTN, HLD, CLL, presenting with left hip pain s/p unwitnessed mechanical fall. X-ray revealing acute left intertrochanteric fracture s/p left hip IMN on 2/13/2021.

## 2021-02-18 LAB
ANION GAP SERPL CALC-SCNC: 15 MMOL/L — HIGH (ref 7–14)
BUN SERPL-MCNC: 17 MG/DL — SIGNIFICANT CHANGE UP (ref 7–23)
CALCIUM SERPL-MCNC: 8.2 MG/DL — LOW (ref 8.4–10.5)
CHLORIDE SERPL-SCNC: 96 MMOL/L — LOW (ref 98–107)
CO2 SERPL-SCNC: 16 MMOL/L — LOW (ref 22–31)
CREAT SERPL-MCNC: 0.42 MG/DL — LOW (ref 0.5–1.3)
GLUCOSE SERPL-MCNC: 98 MG/DL — SIGNIFICANT CHANGE UP (ref 70–99)
POTASSIUM SERPL-MCNC: 4.9 MMOL/L — SIGNIFICANT CHANGE UP (ref 3.5–5.3)
POTASSIUM SERPL-SCNC: 4.9 MMOL/L — SIGNIFICANT CHANGE UP (ref 3.5–5.3)
SODIUM SERPL-SCNC: 127 MMOL/L — LOW (ref 135–145)

## 2021-02-18 PROCEDURE — 99233 SBSQ HOSP IP/OBS HIGH 50: CPT

## 2021-02-18 RX ADMIN — Medication 100 MILLIGRAM(S): at 05:21

## 2021-02-18 RX ADMIN — Medication 81 MILLIGRAM(S): at 12:23

## 2021-02-18 RX ADMIN — PREGABALIN 1000 MICROGRAM(S): 225 CAPSULE ORAL at 12:23

## 2021-02-18 RX ADMIN — Medication 975 MILLIGRAM(S): at 05:20

## 2021-02-18 RX ADMIN — Medication 500 MILLIGRAM(S): at 12:23

## 2021-02-18 RX ADMIN — RANOLAZINE 500 MILLIGRAM(S): 500 TABLET, FILM COATED, EXTENDED RELEASE ORAL at 12:23

## 2021-02-18 RX ADMIN — Medication 975 MILLIGRAM(S): at 23:22

## 2021-02-18 RX ADMIN — CITALOPRAM 20 MILLIGRAM(S): 10 TABLET, FILM COATED ORAL at 12:23

## 2021-02-18 RX ADMIN — Medication 975 MILLIGRAM(S): at 16:20

## 2021-02-18 RX ADMIN — CLOPIDOGREL BISULFATE 75 MILLIGRAM(S): 75 TABLET, FILM COATED ORAL at 12:22

## 2021-02-18 RX ADMIN — Medication 1 TABLET(S): at 12:23

## 2021-02-18 RX ADMIN — Medication 3 MILLIGRAM(S): at 23:22

## 2021-02-18 NOTE — PROGRESS NOTE ADULT - SUBJECTIVE AND OBJECTIVE BOX
[facility] Division of Hospital Medicine  Wiliam Damon MD  Pager (M-F, 8A-5P): [phone]  Other Times:  [phone]    Patient is a 89y old  Female who presents with a chief complaint of left intertrochanteric fracture (18 Feb 2021 07:03)    SUBJECTIVE / OVERNIGHT EVENTS:  No events overnight.  Still labile in mood - pleasant to agitated when trying to examine her.  No overnight issues with F/C, vomiting, SOB, Cough, diarrhea.    MEDICATIONS  (STANDING):  acetaminophen   Tablet .. 975 milliGRAM(s) Oral every 8 hours  ascorbic acid 500 milliGRAM(s) Oral daily  aspirin enteric coated 81 milliGRAM(s) Oral daily  calcium carbonate 1250 mG  + Vitamin D (OsCal 500 + D) 1 Tablet(s) Oral daily  citalopram 20 milliGRAM(s) Oral daily  clopidogrel Tablet 75 milliGRAM(s) Oral daily  cyanocobalamin 1000 MICROGram(s) Oral daily  haloperidol    Injectable 0.25 milliGRAM(s) IV Push once  isosorbide   mononitrate ER Tablet (IMDUR) 30 milliGRAM(s) Oral daily  melatonin 3 milliGRAM(s) Oral at bedtime  metoprolol succinate  milliGRAM(s) Oral daily  pantoprazole    Tablet 40 milliGRAM(s) Oral before breakfast  ranolazine 500 milliGRAM(s) Oral daily  senna 2 Tablet(s) Oral at bedtime  simvastatin 20 milliGRAM(s) Oral at bedtime    MEDICATIONS  (PRN):  bisacodyl Suppository 10 milliGRAM(s) Rectal daily PRN If no bowel movement by POD#2  fentaNYL    Injectable 25 MICROGram(s) IV Push every 15 minutes PRN Severe Pain (7 - 10)  magnesium hydroxide Suspension 30 milliLiter(s) Oral daily PRN Constipation  ondansetron Injectable 4 milliGRAM(s) IV Push once PRN Nausea and/or Vomiting  traMADol 25 milliGRAM(s) Oral every 4 hours PRN Moderate Pain (4 - 6)  traMADol 50 milliGRAM(s) Oral every 4 hours PRN Severe Pain (7 - 10)      Vital Signs Last 24 Hrs  T(C): 36.7 (18 Feb 2021 09:09), Max: 37.1 (17 Feb 2021 14:05)  T(F): 98 (18 Feb 2021 09:09), Max: 98.8 (17 Feb 2021 14:05)  HR: 67 (18 Feb 2021 09:09) (67 - 80)  BP: 142/59 (18 Feb 2021 09:09) (125/90 - 155/60)  BP(mean): --  RR: 17 (18 Feb 2021 09:09) (17 - 18)  SpO2: 98% (18 Feb 2021 09:09) (97% - 100%)  CAPILLARY BLOOD GLUCOSE        I&O's Summary    17 Feb 2021 07:01  -  18 Feb 2021 07:00  --------------------------------------------------------  IN: 0 mL / OUT: 500 mL / NET: -500 mL        PHYSICAL EXAM:  GENERAL: NAD, thin  HEAD:  Atraumatic, Normocephalic  EYES: EOMI, PERRLA, conjunctiva and sclera clear  NECK: Supple, No JVD  CHEST/LUNG: Clear to auscultation bilaterally; No wheeze  HEART: Regular rate and rhythm; No murmurs, rubs, or gallops  ABDOMEN: Soft, Nontender, Nondistended; Bowel sounds present  BACK: Non tender, ROM intact.  EXTREMITIES:  2+ Peripheral Pulses, No clubbing, cyanosis. LLE movement limited due to pain.  PSYCH: Cycling between calm and combativeness.  NEUROLOGY: AAOx1 - name  SKIN: Surgical dressing C/D/I    LABS:    02-18    127<L>  |  96<L>  |  17  ----------------------------<  98  4.9   |  16<L>  |  0.42<L>    Ca    8.2<L>      18 Feb 2021 05:46                RADIOLOGY & ADDITIONAL TESTS:    Imaging Personally Reviewed:    Care Discussed with Consultants/Other Providers:    Care Discussed with Orthopedic PA about:

## 2021-02-18 NOTE — PROGRESS NOTE ADULT - SUBJECTIVE AND OBJECTIVE BOX
Pt seen/examined. Doing well. Pain controlled. No acute overnight complaints or events.    T(C): 36.4 (02-16-21 @ 21:20), Max: 36.7 (02-16-21 @ 09:56)  HR: 65 (02-16-21 @ 21:20) (65 - 77)  BP: 102/50 (02-16-21 @ 21:20) (102/50 - 140/54)  RR: 18 (02-16-21 @ 21:20) (18 - 18)  SpO2: 96% (02-16-21 @ 21:20) (96% - 96%)  Wt(kg): --                          10.2   10.77 )-----------( 189      ( 16 Feb 2021 07:46 )             34.4       02-16    129<L>  |  97<L>  |  20  ----------------------------<  83  3.8   |  22  |  0.48<L>            Gen: awake, alert, NAD  Resp: no increased work of breathing  LLE:  Dressing c/d/i   +EHL/FHL/TA/GS  SILT S/S/SP/DP  +DP/PT Pulses  Compartments soft  No calf TTP     89yFemale s/p L hip IMN. POD 5  - 1L fluid restriction for SIADH  - Med: no bolus fluids, only PO fluids  - Pain control  - DVT ppx: ASA81, Plavix  - OOB/PT  - FU AM labs  - Dispo planning: [place]

## 2021-02-19 LAB
ANION GAP SERPL CALC-SCNC: 10 MMOL/L — SIGNIFICANT CHANGE UP (ref 7–14)
BUN SERPL-MCNC: 14 MG/DL — SIGNIFICANT CHANGE UP (ref 7–23)
CALCIUM SERPL-MCNC: 7.9 MG/DL — LOW (ref 8.4–10.5)
CHLORIDE SERPL-SCNC: 93 MMOL/L — LOW (ref 98–107)
CO2 SERPL-SCNC: 24 MMOL/L — SIGNIFICANT CHANGE UP (ref 22–31)
CREAT SERPL-MCNC: 0.43 MG/DL — LOW (ref 0.5–1.3)
GLUCOSE SERPL-MCNC: 93 MG/DL — SIGNIFICANT CHANGE UP (ref 70–99)
POTASSIUM SERPL-MCNC: 3.9 MMOL/L — SIGNIFICANT CHANGE UP (ref 3.5–5.3)
POTASSIUM SERPL-SCNC: 3.9 MMOL/L — SIGNIFICANT CHANGE UP (ref 3.5–5.3)
SODIUM SERPL-SCNC: 127 MMOL/L — LOW (ref 135–145)

## 2021-02-19 PROCEDURE — 99233 SBSQ HOSP IP/OBS HIGH 50: CPT

## 2021-02-19 RX ORDER — ACETAMINOPHEN 500 MG
680 TABLET ORAL ONCE
Refills: 0 | Status: COMPLETED | OUTPATIENT
Start: 2021-02-19 | End: 2021-02-19

## 2021-02-19 RX ORDER — ACETAMINOPHEN 500 MG
680 TABLET ORAL ONCE
Refills: 0 | Status: COMPLETED | OUTPATIENT
Start: 2021-02-20 | End: 2021-02-20

## 2021-02-19 RX ORDER — ACETAMINOPHEN 500 MG
975 TABLET ORAL EVERY 8 HOURS
Refills: 0 | Status: DISCONTINUED | OUTPATIENT
Start: 2021-02-21 | End: 2021-02-24

## 2021-02-19 RX ORDER — ACETAMINOPHEN 500 MG
1000 TABLET ORAL ONCE
Refills: 0 | Status: DISCONTINUED | OUTPATIENT
Start: 2021-02-19 | End: 2021-02-19

## 2021-02-19 RX ADMIN — Medication 500 MILLIGRAM(S): at 11:16

## 2021-02-19 RX ADMIN — CITALOPRAM 20 MILLIGRAM(S): 10 TABLET, FILM COATED ORAL at 11:17

## 2021-02-19 RX ADMIN — Medication 1 TABLET(S): at 11:17

## 2021-02-19 RX ADMIN — CLOPIDOGREL BISULFATE 75 MILLIGRAM(S): 75 TABLET, FILM COATED ORAL at 11:18

## 2021-02-19 RX ADMIN — SENNA PLUS 2 TABLET(S): 8.6 TABLET ORAL at 21:14

## 2021-02-19 RX ADMIN — SIMVASTATIN 20 MILLIGRAM(S): 20 TABLET, FILM COATED ORAL at 21:14

## 2021-02-19 RX ADMIN — RANOLAZINE 500 MILLIGRAM(S): 500 TABLET, FILM COATED, EXTENDED RELEASE ORAL at 11:19

## 2021-02-19 RX ADMIN — Medication 975 MILLIGRAM(S): at 15:37

## 2021-02-19 RX ADMIN — Medication 81 MILLIGRAM(S): at 11:16

## 2021-02-19 RX ADMIN — Medication 975 MILLIGRAM(S): at 06:40

## 2021-02-19 RX ADMIN — ISOSORBIDE MONONITRATE 30 MILLIGRAM(S): 60 TABLET, EXTENDED RELEASE ORAL at 11:19

## 2021-02-19 RX ADMIN — PANTOPRAZOLE SODIUM 40 MILLIGRAM(S): 20 TABLET, DELAYED RELEASE ORAL at 06:40

## 2021-02-19 RX ADMIN — Medication 100 MILLIGRAM(S): at 06:40

## 2021-02-19 RX ADMIN — Medication 3 MILLIGRAM(S): at 21:14

## 2021-02-19 RX ADMIN — Medication 272 MILLIGRAM(S): at 21:13

## 2021-02-19 RX ADMIN — PREGABALIN 1000 MICROGRAM(S): 225 CAPSULE ORAL at 11:19

## 2021-02-19 NOTE — PROGRESS NOTE ADULT - SUBJECTIVE AND OBJECTIVE BOX
[facility] Division of Hospital Medicine  Wiliam Damon MD  Pager (M-F, 8A-5P): [phone]  Other Times:  [phone]    Patient is a 89y old  Female who presents with a chief complaint of left intertrochanteric fracture (19 Feb 2021 06:59)    SUBJECTIVE / OVERNIGHT EVENTS:  No new issues overnight. History taking limited due to dementia.  No F/C, N/V, CP, SOB, Cough, lightheadedness, dizziness, abdominal pain, diarrhea, dysuria.    MEDICATIONS  (STANDING):  acetaminophen   Tablet .. 975 milliGRAM(s) Oral every 8 hours  ascorbic acid 500 milliGRAM(s) Oral daily  aspirin enteric coated 81 milliGRAM(s) Oral daily  calcium carbonate 1250 mG  + Vitamin D (OsCal 500 + D) 1 Tablet(s) Oral daily  citalopram 20 milliGRAM(s) Oral daily  clopidogrel Tablet 75 milliGRAM(s) Oral daily  cyanocobalamin 1000 MICROGram(s) Oral daily  isosorbide   mononitrate ER Tablet (IMDUR) 30 milliGRAM(s) Oral daily  melatonin 3 milliGRAM(s) Oral at bedtime  metoprolol succinate  milliGRAM(s) Oral daily  pantoprazole    Tablet 40 milliGRAM(s) Oral before breakfast  ranolazine 500 milliGRAM(s) Oral daily  senna 2 Tablet(s) Oral at bedtime  simvastatin 20 milliGRAM(s) Oral at bedtime    MEDICATIONS  (PRN):  bisacodyl Suppository 10 milliGRAM(s) Rectal daily PRN If no bowel movement by POD#2  fentaNYL    Injectable 25 MICROGram(s) IV Push every 15 minutes PRN Severe Pain (7 - 10)  magnesium hydroxide Suspension 30 milliLiter(s) Oral daily PRN Constipation  ondansetron Injectable 4 milliGRAM(s) IV Push once PRN Nausea and/or Vomiting  traMADol 25 milliGRAM(s) Oral every 4 hours PRN Moderate Pain (4 - 6)  traMADol 50 milliGRAM(s) Oral every 4 hours PRN Severe Pain (7 - 10)      Vital Signs Last 24 Hrs  T(C): 36.4 (19 Feb 2021 09:05), Max: 37 (18 Feb 2021 18:05)  T(F): 97.6 (19 Feb 2021 09:05), Max: 98.6 (18 Feb 2021 18:05)  HR: 63 (19 Feb 2021 09:05) (63 - 79)  BP: 149/64 (19 Feb 2021 09:05) (127/69 - 149/64)  BP(mean): --  RR: 17 (19 Feb 2021 09:05) (16 - 17)  SpO2: 97% (19 Feb 2021 09:05) (96% - 97%)  CAPILLARY BLOOD GLUCOSE        I&O's Summary    18 Feb 2021 07:01  -  19 Feb 2021 07:00  --------------------------------------------------------  IN: 0 mL / OUT: 550 mL / NET: -550 mL        PHYSICAL EXAM:  GENERAL: NAD, thin  HEAD:  Atraumatic, Normocephalic  EYES: EOMI, PERRLA, conjunctiva and sclera clear  NECK: Supple, No JVD  CHEST/LUNG: Clear to auscultation bilaterally; No wheeze  HEART: Regular rate and rhythm; No murmurs, rubs, or gallops  ABDOMEN: Soft, Nontender, Nondistended; Bowel sounds present  BACK: Non tender, ROM intact.  EXTREMITIES:  2+ Peripheral Pulses, No clubbing, cyanosis. LLE movement limited due to pain.  PSYCH: Cycling between calm and combativeness.  NEUROLOGY: AAOx1 - name  SKIN: Surgical dressing C/D/I    LABS:    02-19    127<L>  |  93<L>  |  14  ----------------------------<  93  3.9   |  24  |  0.43<L>    Ca    7.9<L>      19 Feb 2021 07:37                RADIOLOGY & ADDITIONAL TESTS:    Imaging Personally Reviewed:    Care Discussed with Consultants/Other Providers:    Care Discussed with Orthopedic PA about:

## 2021-02-19 NOTE — PROGRESS NOTE ADULT - SUBJECTIVE AND OBJECTIVE BOX
ORTHO PROGRESS NOTE     Pt seen and examined at bedside, denies SOB, CP, Dizziness. N/V/D /HA.  No significant overnight events. Pain well controlled.      Vital Signs Last 24 Hrs  T(C): 36.4 (19 Feb 2021 06:37), Max: 37 (18 Feb 2021 18:05)  T(F): 97.5 (19 Feb 2021 06:37), Max: 98.6 (18 Feb 2021 18:05)  HR: 67 (19 Feb 2021 06:37) (67 - 79)  BP: 142/60 (19 Feb 2021 06:37) (127/69 - 148/60)  BP(mean): --  RR: 16 (19 Feb 2021 06:37) (16 - 17)  SpO2: 97% (19 Feb 2021 06:37) (96% - 98%)    Gen: No apparent distress    left hip :  Dressing C/D I motor intact EHL/FHL/TA/GS .  Sensation is grossly intact to light touch in the distal extremities.  Compartments are soft bilaterally.  Extremities are warm .  DP 2+ BLE     Labs:        02-18    127<L>  |  96<L>  |  17  ----------------------------<  98  4.9   |  16<L>  |  0.42<L>    Ca    8.2<L>      18 Feb 2021 05:46           A/P  s/p left hip IMN POD 6    - Pain control/ Analgesia  - DVT ppx ASA81/plavix, foot pumps  - PT/OT - wbat/oob    - Incentive Spirometer  - FU am Na  - notify Ortho for questions

## 2021-02-20 LAB
ANION GAP SERPL CALC-SCNC: 8 MMOL/L — SIGNIFICANT CHANGE UP (ref 7–14)
BUN SERPL-MCNC: 20 MG/DL — SIGNIFICANT CHANGE UP (ref 7–23)
CALCIUM SERPL-MCNC: 7.9 MG/DL — LOW (ref 8.4–10.5)
CHLORIDE SERPL-SCNC: 92 MMOL/L — LOW (ref 98–107)
CO2 SERPL-SCNC: 26 MMOL/L — SIGNIFICANT CHANGE UP (ref 22–31)
CREAT SERPL-MCNC: 0.47 MG/DL — LOW (ref 0.5–1.3)
GLUCOSE SERPL-MCNC: 92 MG/DL — SIGNIFICANT CHANGE UP (ref 70–99)
POTASSIUM SERPL-MCNC: 4.7 MMOL/L — SIGNIFICANT CHANGE UP (ref 3.5–5.3)
POTASSIUM SERPL-SCNC: 4.7 MMOL/L — SIGNIFICANT CHANGE UP (ref 3.5–5.3)
SODIUM SERPL-SCNC: 126 MMOL/L — LOW (ref 135–145)

## 2021-02-20 PROCEDURE — 99233 SBSQ HOSP IP/OBS HIGH 50: CPT

## 2021-02-20 RX ORDER — BENZOCAINE AND MENTHOL 5; 1 G/100ML; G/100ML
1 LIQUID ORAL EVERY 4 HOURS
Refills: 0 | Status: DISCONTINUED | OUTPATIENT
Start: 2021-02-20 | End: 2021-02-21

## 2021-02-20 RX ORDER — SODIUM CHLORIDE 9 MG/ML
1 INJECTION INTRAMUSCULAR; INTRAVENOUS; SUBCUTANEOUS
Refills: 0 | Status: COMPLETED | OUTPATIENT
Start: 2021-02-20 | End: 2021-02-22

## 2021-02-20 RX ADMIN — CITALOPRAM 20 MILLIGRAM(S): 10 TABLET, FILM COATED ORAL at 13:23

## 2021-02-20 RX ADMIN — Medication 272 MILLIGRAM(S): at 10:18

## 2021-02-20 RX ADMIN — Medication 100 MILLIGRAM(S): at 04:42

## 2021-02-20 RX ADMIN — ISOSORBIDE MONONITRATE 30 MILLIGRAM(S): 60 TABLET, EXTENDED RELEASE ORAL at 17:04

## 2021-02-20 RX ADMIN — PREGABALIN 1000 MICROGRAM(S): 225 CAPSULE ORAL at 13:23

## 2021-02-20 RX ADMIN — SENNA PLUS 2 TABLET(S): 8.6 TABLET ORAL at 21:51

## 2021-02-20 RX ADMIN — SODIUM CHLORIDE 1 GRAM(S): 9 INJECTION INTRAMUSCULAR; INTRAVENOUS; SUBCUTANEOUS at 17:41

## 2021-02-20 RX ADMIN — Medication 272 MILLIGRAM(S): at 04:39

## 2021-02-20 RX ADMIN — Medication 81 MILLIGRAM(S): at 13:23

## 2021-02-20 RX ADMIN — SIMVASTATIN 20 MILLIGRAM(S): 20 TABLET, FILM COATED ORAL at 21:51

## 2021-02-20 RX ADMIN — SODIUM CHLORIDE 1 GRAM(S): 9 INJECTION INTRAMUSCULAR; INTRAVENOUS; SUBCUTANEOUS at 10:45

## 2021-02-20 RX ADMIN — Medication 272 MILLIGRAM(S): at 17:05

## 2021-02-20 RX ADMIN — Medication 1 TABLET(S): at 13:23

## 2021-02-20 RX ADMIN — RANOLAZINE 500 MILLIGRAM(S): 500 TABLET, FILM COATED, EXTENDED RELEASE ORAL at 13:23

## 2021-02-20 RX ADMIN — Medication 3 MILLIGRAM(S): at 21:51

## 2021-02-20 RX ADMIN — CLOPIDOGREL BISULFATE 75 MILLIGRAM(S): 75 TABLET, FILM COATED ORAL at 13:23

## 2021-02-20 RX ADMIN — Medication 500 MILLIGRAM(S): at 13:23

## 2021-02-20 RX ADMIN — PANTOPRAZOLE SODIUM 40 MILLIGRAM(S): 20 TABLET, DELAYED RELEASE ORAL at 06:32

## 2021-02-20 RX ADMIN — Medication 975 MILLIGRAM(S): at 11:45

## 2021-02-20 RX ADMIN — TRAMADOL HYDROCHLORIDE 50 MILLIGRAM(S): 50 TABLET ORAL at 07:22

## 2021-02-20 NOTE — PROGRESS NOTE ADULT - SUBJECTIVE AND OBJECTIVE BOX
[facility] Division of Hospital Medicine  Jessica Wharton  Pager (M-F, 8A-5P): [phone]  Other Times:  [phone]    Patient is a 89y old  Female who presents with a chief complaint of left intertrochanteric fracture (19 Feb 2021 06:59)    SUBJECTIVE / OVERNIGHT EVENTS:  No new issues overnight. History taking limited due to dementia. Working with PT during my visit.    MEDICATIONS  (STANDING):  acetaminophen   Tablet .. 975 milliGRAM(s) Oral every 8 hours  ascorbic acid 500 milliGRAM(s) Oral daily  aspirin enteric coated 81 milliGRAM(s) Oral daily  calcium carbonate 1250 mG  + Vitamin D (OsCal 500 + D) 1 Tablet(s) Oral daily  citalopram 20 milliGRAM(s) Oral daily  clopidogrel Tablet 75 milliGRAM(s) Oral daily  cyanocobalamin 1000 MICROGram(s) Oral daily  isosorbide   mononitrate ER Tablet (IMDUR) 30 milliGRAM(s) Oral daily  melatonin 3 milliGRAM(s) Oral at bedtime  metoprolol succinate  milliGRAM(s) Oral daily  pantoprazole    Tablet 40 milliGRAM(s) Oral before breakfast  ranolazine 500 milliGRAM(s) Oral daily  senna 2 Tablet(s) Oral at bedtime  simvastatin 20 milliGRAM(s) Oral at bedtime    MEDICATIONS  (PRN):  bisacodyl Suppository 10 milliGRAM(s) Rectal daily PRN If no bowel movement by POD#2  fentaNYL    Injectable 25 MICROGram(s) IV Push every 15 minutes PRN Severe Pain (7 - 10)  magnesium hydroxide Suspension 30 milliLiter(s) Oral daily PRN Constipation  ondansetron Injectable 4 milliGRAM(s) IV Push once PRN Nausea and/or Vomiting  traMADol 25 milliGRAM(s) Oral every 4 hours PRN Moderate Pain (4 - 6)  traMADol 50 milliGRAM(s) Oral every 4 hours PRN Severe Pain (7 - 10)      Vital Signs Last 24 Hrs  T(C): 36.6 (20 Feb 2021 09:40), Max: 36.8 (19 Feb 2021 15:25)  T(F): 97.8 (20 Feb 2021 09:40), Max: 98.3 (19 Feb 2021 15:25)  HR: 58 (20 Feb 2021 09:40) (58 - 66)  BP: 137/61 (20 Feb 2021 09:40) (103/56 - 137/61)  BP(mean): --  RR: 18 (20 Feb 2021 09:40) (16 - 18)  SpO2: 98% (20 Feb 2021 09:40) (95% - 99%)    I&O's Summary    18 Feb 2021 07:01  -  19 Feb 2021 07:00  --------------------------------------------------------  IN: 0 mL / OUT: 550 mL / NET: -550 mL      PHYSICAL EXAM:  GENERAL: NAD, thin  HEAD:  Atraumatic, Normocephalic  EYES: EOMI, PERRLA, conjunctiva and sclera clear  NECK: Supple, No JVD  CHEST/LUNG: Clear to auscultation bilaterally; No wheeze  HEART: Regular rate and rhythm; No murmurs, rubs, or gallops  ABDOMEN: Soft, Nontender, Nondistended; Bowel sounds present  BACK: Non tender, ROM intact.  EXTREMITIES:  2+ Peripheral Pulses, No clubbing, cyanosis. LLE movement limited due to pain.  PSYCH: Cycling between calm and combativeness.  NEUROLOGY: AAOx1 - name  SKIN: Surgical dressing C/D/I    LABS:    02-20    126<L>  |  92<L>  |  20  ----------------------------<  92  4.7   |  26  |  0.47<L>    Ca    7.9<L>      20 Feb 2021 07:47        RADIOLOGY & ADDITIONAL TESTS:    Imaging Personally Reviewed:    Care Discussed with Consultants/Other Providers:    Care Discussed with Orthopedic PA about:

## 2021-02-20 NOTE — PROGRESS NOTE ADULT - SUBJECTIVE AND OBJECTIVE BOX
Patient seen and examined at bedside. Reports no acute complaints at this time. Pain is well controlled.    Vital Signs Last 24 Hrs  T(C): 36.4 (19 Feb 2021 21:07), Max: 36.8 (19 Feb 2021 15:25)  T(F): 97.5 (19 Feb 2021 21:07), Max: 98.3 (19 Feb 2021 15:25)  HR: 61 (19 Feb 2021 21:07) (61 - 67)  BP: 121/50 (19 Feb 2021 21:07) (103/56 - 149/64)  BP(mean): --  RR: 18 (19 Feb 2021 21:07) (16 - 18)  SpO2: 97% (19 Feb 2021 21:07) (97% - 99%)02-19    127<L>  |  93<L>  |  14  ----------------------------<  93  3.9   |  24  |  0.43<L>    Ca    7.9<L>      19 Feb 2021 07:37        PHYSICAL EXAM:    Gen: No apparent distress    left hip :  Dressing C/D I motor intact EHL/FHL/TA/GS .  Sensation is grossly intact to light touch in the distal extremities.  Compartments are soft bilaterally.  Extremities are warm .  DP 2+ BLE       A/P  s/p left hip IMN POD 7    - Pain control/ Analgesia  - DVT ppx ASA81/plavix, foot pumps  - PT/OT - wbat/oob    - Incentive Spirometer  - FU am Na  - notify Ortho for questions

## 2021-02-21 LAB
ANION GAP SERPL CALC-SCNC: 9 MMOL/L — SIGNIFICANT CHANGE UP (ref 7–14)
BUN SERPL-MCNC: 19 MG/DL — SIGNIFICANT CHANGE UP (ref 7–23)
CALCIUM SERPL-MCNC: 8.2 MG/DL — LOW (ref 8.4–10.5)
CHLORIDE SERPL-SCNC: 91 MMOL/L — LOW (ref 98–107)
CO2 SERPL-SCNC: 26 MMOL/L — SIGNIFICANT CHANGE UP (ref 22–31)
CREAT SERPL-MCNC: 0.5 MG/DL — SIGNIFICANT CHANGE UP (ref 0.5–1.3)
GLUCOSE SERPL-MCNC: 94 MG/DL — SIGNIFICANT CHANGE UP (ref 70–99)
POTASSIUM SERPL-MCNC: 4.9 MMOL/L — SIGNIFICANT CHANGE UP (ref 3.5–5.3)
POTASSIUM SERPL-SCNC: 4.9 MMOL/L — SIGNIFICANT CHANGE UP (ref 3.5–5.3)
SODIUM SERPL-SCNC: 126 MMOL/L — LOW (ref 135–145)

## 2021-02-21 PROCEDURE — 99233 SBSQ HOSP IP/OBS HIGH 50: CPT

## 2021-02-21 RX ORDER — KETOROLAC TROMETHAMINE 30 MG/ML
10 SYRINGE (ML) INJECTION ONCE
Refills: 0 | Status: DISCONTINUED | OUTPATIENT
Start: 2021-02-21 | End: 2021-02-21

## 2021-02-21 RX ORDER — LIDOCAINE 4 G/100G
2 CREAM TOPICAL DAILY
Refills: 0 | Status: DISCONTINUED | OUTPATIENT
Start: 2021-02-21 | End: 2021-02-24

## 2021-02-21 RX ORDER — BENZOCAINE AND MENTHOL 5; 1 G/100ML; G/100ML
1 LIQUID ORAL THREE TIMES A DAY
Refills: 0 | Status: COMPLETED | OUTPATIENT
Start: 2021-02-21 | End: 2021-02-23

## 2021-02-21 RX ORDER — DIPHENHYDRAMINE HYDROCHLORIDE AND LIDOCAINE HYDROCHLORIDE AND ALUMINUM HYDROXIDE AND MAGNESIUM HYDRO
5 KIT
Refills: 0 | Status: DISCONTINUED | OUTPATIENT
Start: 2021-02-21 | End: 2021-02-24

## 2021-02-21 RX ORDER — NYSTATIN 500MM UNIT
500000 POWDER (EA) MISCELLANEOUS
Refills: 0 | Status: DISCONTINUED | OUTPATIENT
Start: 2021-02-21 | End: 2021-02-24

## 2021-02-21 RX ORDER — BENZOCAINE AND MENTHOL 5; 1 G/100ML; G/100ML
1 LIQUID ORAL THREE TIMES A DAY
Refills: 0 | Status: DISCONTINUED | OUTPATIENT
Start: 2021-02-21 | End: 2021-02-21

## 2021-02-21 RX ADMIN — Medication 3 MILLIGRAM(S): at 22:29

## 2021-02-21 RX ADMIN — Medication 500 MILLIGRAM(S): at 12:13

## 2021-02-21 RX ADMIN — Medication 975 MILLIGRAM(S): at 07:58

## 2021-02-21 RX ADMIN — PANTOPRAZOLE SODIUM 40 MILLIGRAM(S): 20 TABLET, DELAYED RELEASE ORAL at 05:17

## 2021-02-21 RX ADMIN — DIPHENHYDRAMINE HYDROCHLORIDE AND LIDOCAINE HYDROCHLORIDE AND ALUMINUM HYDROXIDE AND MAGNESIUM HYDRO 5 MILLILITER(S): KIT at 11:01

## 2021-02-21 RX ADMIN — SODIUM CHLORIDE 1 GRAM(S): 9 INJECTION INTRAMUSCULAR; INTRAVENOUS; SUBCUTANEOUS at 05:17

## 2021-02-21 RX ADMIN — LIDOCAINE 2 PATCH: 4 CREAM TOPICAL at 23:04

## 2021-02-21 RX ADMIN — LIDOCAINE 2 PATCH: 4 CREAM TOPICAL at 11:00

## 2021-02-21 RX ADMIN — BENZOCAINE AND MENTHOL 1 LOZENGE: 5; 1 LIQUID ORAL at 14:16

## 2021-02-21 RX ADMIN — CLOPIDOGREL BISULFATE 75 MILLIGRAM(S): 75 TABLET, FILM COATED ORAL at 12:12

## 2021-02-21 RX ADMIN — Medication 100 MILLIGRAM(S): at 05:17

## 2021-02-21 RX ADMIN — SODIUM CHLORIDE 1 GRAM(S): 9 INJECTION INTRAMUSCULAR; INTRAVENOUS; SUBCUTANEOUS at 19:19

## 2021-02-21 RX ADMIN — BENZOCAINE AND MENTHOL 1 LOZENGE: 5; 1 LIQUID ORAL at 22:28

## 2021-02-21 RX ADMIN — SIMVASTATIN 20 MILLIGRAM(S): 20 TABLET, FILM COATED ORAL at 22:29

## 2021-02-21 RX ADMIN — CITALOPRAM 20 MILLIGRAM(S): 10 TABLET, FILM COATED ORAL at 12:13

## 2021-02-21 RX ADMIN — LIDOCAINE 2 PATCH: 4 CREAM TOPICAL at 18:31

## 2021-02-21 RX ADMIN — ISOSORBIDE MONONITRATE 30 MILLIGRAM(S): 60 TABLET, EXTENDED RELEASE ORAL at 12:12

## 2021-02-21 RX ADMIN — PREGABALIN 1000 MICROGRAM(S): 225 CAPSULE ORAL at 12:12

## 2021-02-21 RX ADMIN — Medication 500000 UNIT(S): at 19:20

## 2021-02-21 RX ADMIN — Medication 1 TABLET(S): at 12:12

## 2021-02-21 RX ADMIN — Medication 81 MILLIGRAM(S): at 12:13

## 2021-02-21 RX ADMIN — Medication 975 MILLIGRAM(S): at 22:28

## 2021-02-21 RX ADMIN — DIPHENHYDRAMINE HYDROCHLORIDE AND LIDOCAINE HYDROCHLORIDE AND ALUMINUM HYDROXIDE AND MAGNESIUM HYDRO 5 MILLILITER(S): KIT at 19:19

## 2021-02-21 RX ADMIN — RANOLAZINE 500 MILLIGRAM(S): 500 TABLET, FILM COATED, EXTENDED RELEASE ORAL at 12:13

## 2021-02-21 RX ADMIN — Medication 10 MILLIGRAM(S): at 22:28

## 2021-02-21 NOTE — PROGRESS NOTE ADULT - SUBJECTIVE AND OBJECTIVE BOX
[facility] Division of Hospital Medicine  Jessica Wharton  Pager (M-F, 8A-5P): [phone]  Other Times:  [phone]    Patient is a 89y old  Female who presents with a chief complaint of left intertrochanteric fracture (19 Feb 2021 06:59)    SUBJECTIVE / OVERNIGHT EVENTS:  Patient states she has pain everywhere. Also c/o sore throat.     MEDICATIONS  (STANDING):  acetaminophen   Tablet .. 975 milliGRAM(s) Oral every 8 hours  ascorbic acid 500 milliGRAM(s) Oral daily  aspirin enteric coated 81 milliGRAM(s) Oral daily  calcium carbonate 1250 mG  + Vitamin D (OsCal 500 + D) 1 Tablet(s) Oral daily  citalopram 20 milliGRAM(s) Oral daily  clopidogrel Tablet 75 milliGRAM(s) Oral daily  cyanocobalamin 1000 MICROGram(s) Oral daily  isosorbide   mononitrate ER Tablet (IMDUR) 30 milliGRAM(s) Oral daily  melatonin 3 milliGRAM(s) Oral at bedtime  metoprolol succinate  milliGRAM(s) Oral daily  pantoprazole    Tablet 40 milliGRAM(s) Oral before breakfast  ranolazine 500 milliGRAM(s) Oral daily  senna 2 Tablet(s) Oral at bedtime  simvastatin 20 milliGRAM(s) Oral at bedtime    MEDICATIONS  (PRN):  bisacodyl Suppository 10 milliGRAM(s) Rectal daily PRN If no bowel movement by POD#2  fentaNYL    Injectable 25 MICROGram(s) IV Push every 15 minutes PRN Severe Pain (7 - 10)  magnesium hydroxide Suspension 30 milliLiter(s) Oral daily PRN Constipation  ondansetron Injectable 4 milliGRAM(s) IV Push once PRN Nausea and/or Vomiting  traMADol 25 milliGRAM(s) Oral every 4 hours PRN Moderate Pain (4 - 6)  traMADol 50 milliGRAM(s) Oral every 4 hours PRN Severe Pain (7 - 10)      Vital Signs Last 24 Hrs  T(C): 36.4 (21 Feb 2021 05:54), Max: 36.7 (20 Feb 2021 13:20)  T(F): 97.6 (21 Feb 2021 05:54), Max: 98 (20 Feb 2021 13:20)  HR: 59 (21 Feb 2021 05:54) (55 - 61)  BP: 153/51 (21 Feb 2021 05:54) (119/57 - 153/51)  BP(mean): --  RR: 18 (21 Feb 2021 05:54) (17 - 18)  SpO2: 96% (21 Feb 2021 05:54) (95% - 97%)    I&O's Summary    20 Feb 2021 07:01  -  21 Feb 2021 07:00  --------------------------------------------------------  IN: 160 mL / OUT: 300 mL / NET: -140 mL    21 Feb 2021 07:01  -  21 Feb 2021 10:10  --------------------------------------------------------  IN: 0 mL / OUT: 100 mL / NET: -100 mL        PHYSICAL EXAM:  GENERAL: NAD, thin  HEAD:  Atraumatic, Normocephalic  EYES: EOMI, PERRLA, conjunctiva and sclera clear  NECK: Supple, No JVD  CHEST/LUNG: Clear to auscultation bilaterally; No wheeze  HEART: Regular rate and rhythm; No murmurs, rubs, or gallops  ABDOMEN: Soft, Nontender, Nondistended; Bowel sounds present  BACK: Non tender, ROM intact.  EXTREMITIES:  2+ Peripheral Pulses, No clubbing, cyanosis. LLE movement limited due to pain.  PSYCH: Cycling between calm and combativeness.  NEUROLOGY: AAOx1 - name  SKIN: Surgical dressing C/D/I    LABS:            RADIOLOGY & ADDITIONAL TESTS:    Imaging Personally Reviewed:    Care Discussed with Consultants/Other Providers:    Care Discussed with Orthopedic PA about:

## 2021-02-21 NOTE — PROGRESS NOTE ADULT - ATTENDING COMMENTS
Agree with resident assessment and plan. Discussed plan of care, seen and examined at bedside.     Briefly, POD#2 L hip IMN. No acute events overnight. Chest pain, Trops stable ~60. Dec H/H, pending PRBC's    PE:  Gen: NAD, laying comfortably in bed  Resp: Unlabored breathing  MSK: Dressing c/d/i          +EHL/FHL/TA/Gas/SOl          +DP/SP/Brian/Saph/T           2+DP    A/P: 89F POD#1 L hip IMN  -Neuro: Multimodal pain control  -Resp: IS  -GI: reg diet  -CV: appreciate continued medical care/treatment, monitor H/H  -MSK: OOB, WBAT, PT/OT  -Heme: DVT PPx: A81, plavix, PRBC's as needed  -Rehab pending
Agree with resident assessment and plan. Discussed plan of care.     Briefly, POD#1 L hip IMN. No acute events overnight. Pain well controlled.     PE:  Gen: NAD, laying comfortably in bed  Resp: Unlabored breathing  MSK: Dressing c/d/i          +EHL/FHL/TA/Gas/SOl          +DP/SP/Brian/Saph/T           2+DP    A/P: 89F POD#1 L hip IMN  -Neuro: Multimodal pain control  -Resp: IS  -GI: reg diet  -MSK: OOB, WBAT, PT/OT  -Heme: DVT PPx: A81, plavix   -Will plan rehab
Agree with resident assessment and plan. Seen and examined at bedside. 1 unit of PRBC this morning.     Gen: awake, alert, NAD  Resp: no increased work of breathing  LLE:  skin intact  externally rotated leg  limited hip ROM do to pain   +EHL/FHL/TA/GS  SILT S/S/SP/DP  +DP/PT Pulses  Compartments soft  No calf TTP   positive log roll    89yFemale s/p left IT fracture. OR today.  - Pain control  - mechanical/DVT ppx  - NWB LLE  - Medicine and cardiology consent documented  - consent in chart  -Will follow up post-op
-start magic mouthwash and Cepacol lozenge for sore throat

## 2021-02-21 NOTE — PROVIDER CONTACT NOTE (OTHER) - ASSESSMENT
Pt has a molst form in chart stating DNR, an order needs to be put in place
When put on the sticker pulse oximeter, the O2 was better to  at 99%. Patient's fingertips are cold and patient complains of numbness and tingling in hands.
dryness to tongue with some whiteness on it
Pt pointing to epigastric region. When asked if it feels like if elephant sittign on chest , says no
Pt is A&Ox3, forgetful at times. Vitals stable except for lower BP. Pt complaining of chest pain, states she gets this sometimes but its bad this time.

## 2021-02-21 NOTE — PROVIDER CONTACT NOTE (OTHER) - REASON
Pts tongue is hurting her
Patient is complaining of "bad chest pains" and "feels like someone is sitting on my chest"
Patient's O2 is not picking up well on fingers or ear. O2 would be at 95% and then drop to 79% but no good pleth and then will go up to 85 and 90% again.
Pt needs an order for DNR
patient c/o chest pain

## 2021-02-21 NOTE — PROVIDER CONTACT NOTE (OTHER) - SITUATION
Pt c/o chest pain and heartburn concurrently
Pts tongue is hurting her, when looking at it, it seems dry with some whiteness to it.
Patient's O2 is not picking up well on fingers or ear. O2 would be at 95% and then drop to 79% but no good pleth and then will go up to 85 and 90% again.
Pt has a molst form in chart stating DNR, an order needs to be put in place
Patient is complaining of "bad chest pains" and "feels like someone is sitting on my chest"

## 2021-02-21 NOTE — CONSULT NOTE ADULT - SUBJECTIVE AND OBJECTIVE BOX
HPI: 89y Female legally blind with hx of CAD s/p PCI, CHB s/p PPM, carotid stenosis s/p CEA, HTN, HLD, CLL, presented to ED with L hip pain s/p unwitnessed mechanical fall, found to have acute L intertrochanteric fracture s/p OR 2/13. Over the past day, pt has started to complain of painful tongue that is worse when it is touched and makes eating painful. She denied hx of having this before (although she did indicate that it is possible that she does not remember such an event). Her nurse, who was at bedside during exam, stated that she has been having a hard time eating due to the pain and has now been coughing with food, which has not occurred in the beginning of her admission. Pt was started on magic mouthwash this morning. She has not had any fevers or chills.     Of note, her post op period has been complicated by hyponatremia and she is currently being fluid restricted. She has only been having small sips with meds.       Allergies    morphine (Other)  oxycodone (Unknown)  Percocet 5/325 (Unknown)  pilocarpine (Other)    PAST MEDICAL & SURGICAL HISTORY:  Legally blind    Complete heart block    Hyperlipemia    Pulmonary embolism    Cancer of Uterus    Osteoarthritis    Cataract    Osteoporosis    Glaucoma, Narrow-Angle    Dyslipidemia    CLL (Chronic Lymphoblastic Yosef    Coronary Artery Disease    HTN    Closed fracture of femur  h/o ORIF right femur fracture, 2012    H/O bilateral cataract extraction    History of total abdominal hysterectomy  MAXX-BSO    History of tonsillectomy  as a child    Retina  retina and glaucoma surgery left eye -here 12/11, 2/12 and 3/12    baerveldt implant left eye    History of Carotid Endarterectomy  on L 2009    Coronary Stent  2006 x 3 LAD, Circ, RCA  2012 GRETA placed    History of Arthroscopic Knee S  right knee x 2    Cardiac Pacemaker  2009    Dystrophy, Cornea  right cornea implant      MEDICATIONS:  Antiinfectives:       Hematologic/Anticoagulation:  aspirin enteric coated 81 milliGRAM(s) Oral daily  clopidogrel Tablet 75 milliGRAM(s) Oral daily      Pain medications/Neuro:  acetaminophen   Tablet .. 975 milliGRAM(s) Oral every 8 hours  citalopram 20 milliGRAM(s) Oral daily  melatonin 3 milliGRAM(s) Oral at bedtime  ondansetron Injectable 4 milliGRAM(s) IV Push once PRN      IV fluids:  ascorbic acid 500 milliGRAM(s) Oral daily  calcium carbonate 1250 mG  + Vitamin D (OsCal 500 + D) 1 Tablet(s) Oral daily  cyanocobalamin 1000 MICROGram(s) Oral daily  sodium chloride 1 Gram(s) Oral two times a day      Endocrine Medications:   simvastatin 20 milliGRAM(s) Oral at bedtime      All other standing medications:   benzocaine 15 mG/menthol 3.6 mG (Sugar-Free) Lozenge 1 Lozenge Oral three times a day  FIRST- Mouthwash  BLM 5 milliLiter(s) Swish and Spit two times a day  isosorbide   mononitrate ER Tablet (IMDUR) 30 milliGRAM(s) Oral daily  lidocaine   Patch 2 Patch Transdermal daily  metoprolol succinate  milliGRAM(s) Oral daily  pantoprazole    Tablet 40 milliGRAM(s) Oral before breakfast  ranolazine 500 milliGRAM(s) Oral daily  senna 2 Tablet(s) Oral at bedtime      All other PRN medications:  aluminum hydroxide/magnesium hydroxide/simethicone Suspension 30 milliLiter(s) Oral every 4 hours PRN  bisacodyl Suppository 10 milliGRAM(s) Rectal daily PRN  magnesium hydroxide Suspension 30 milliLiter(s) Oral daily PRN      Vital Signs Last 24 Hrs  T(C): 36.4 (21 Feb 2021 14:32), Max: 36.7 (21 Feb 2021 11:37)  T(F): 97.5 (21 Feb 2021 14:32), Max: 98.1 (21 Feb 2021 11:37)  HR: 69 (21 Feb 2021 14:32) (59 - 69)  BP: 129/56 (21 Feb 2021 14:32) (121/52 - 153/51)  BP(mean): --  RR: 16 (21 Feb 2021 14:32) (16 - 18)  SpO2: 96% (21 Feb 2021 14:32) (95% - 99%)    LABS:  CBC-    02-21    126<L>  |  91<L>  |  19  ----------------------------<  94  4.9   |  26  |  0.50    Ca    8.2<L>      21 Feb 2021 07:45      Coagulation Studies-    Endocrine Panel-  --  --  8.2 mg/dL  --  --  7.9 mg/dL        PHYSICAL EXAM:    ENT EXAM-   Constitutional: Well-developed, well-nourished.  No hoarseness. Eyes remained shut during exam, breathing with mouth open      Head:  normocephalic, atraumatic.   Nose:  Septum intact, midline, deviated.  Inferior turbinates normal bilateral  OC/OP:  Tongue appears extremely dry and cracked with scab/crusted lesions over the dorsal surface of the tongue. Tongue tender to palpation. Floor of mouth without lesions Buccal mucosa feels dry but no lesions seen here. Oropharynx seen without any lesions on posterior pharyngeal wall. Lips appear normal. Short uvula noted.   Neck:  Trachea midline.  Thyroid, parotid and submandibular glands normal.  Lymph:  No cervical adenopathy.         Assessment/Plan:  89y Female with complicated medical hx admitted s/p fall with resulting intertrochanteric fracture now s/p OR 2/13 with post op complication of hyponatremia and getting fluid restricted now complaining of tongue pain at baseline and worsening with eating with physical exam findings significant for very dry and cracked tongue as well as cracked and crusted brwon lesions. Lesions could be secondary to dry tongue or could represent candida infection.    - No acute ENT intervention  - Would continue with magic mouthwash. If this doesn't help with pain after a few doses, can add viscous lidocaine swish and spit to regimen   - Would start Nystatin swish and swallow (her magic mouthwash does not contain nystatin as per pharmacy)  - Recommend humidified face tent to help moisten mucosa as the dryness seems to be exacerbating her symptoms/pain  - Recommend daily oral care for oral hygiene   - If coughing while eating, would recommend SLP consult for swallow demi Dumont ENT at [phone] with any questions/concerns.

## 2021-02-21 NOTE — PROGRESS NOTE ADULT - SUBJECTIVE AND OBJECTIVE BOX
Patient seen and examined at bedside. Reports no acute complaints at this time. Pain is well controlled.    Vitals 24hrs  Vital Signs Last 24 Hrs  T(C): 36.5 (20 Feb 2021 20:57), Max: 36.7 (20 Feb 2021 13:20)  T(F): 97.7 (20 Feb 2021 20:57), Max: 98 (20 Feb 2021 13:20)  HR: 60 (20 Feb 2021 20:57) (55 - 61)  BP: 133/59 (20 Feb 2021 20:57) (119/57 - 137/61)  BP(mean): --  RR: 18 (20 Feb 2021 20:57) (17 - 18)  SpO2: 95% (20 Feb 2021 20:57) (95% - 98%)      02-19-21 @ 07:01  -  02-20-21 @ 07:00  --------------------------------------------------------  IN: 270 mL / OUT: 300 mL / NET: -30 mL    02-20-21 @ 07:01  -  02-21-21 @ 06:17  --------------------------------------------------------  IN: 160 mL / OUT: 300 mL / NET: -140 mL        Lab Results 24hrs:    02-20    126<L>  |  92<L>  |  20  ----------------------------<  92  4.7   |  26  |  0.47<L>    Ca    7.9<L>      20 Feb 2021 07:47        PHYSICAL EXAM:    Gen: No apparent distress    left hip :  Dressing C/D I motor intact EHL/FHL/TA/GS .  Sensation is grossly intact to light touch in the distal extremities.  Compartments are soft bilaterally.  Extremities are warm .  DP 2+ BLE       A/P  s/p left hip IMN POD 8    - Pain control/ Analgesia  - DVT ppx ASA81/plavix, foot pumps  - PT/OT - wbat/oob    - Incentive Spirometer  - FU am Na  - notify Ortho for questions

## 2021-02-21 NOTE — PROVIDER CONTACT NOTE (OTHER) - RECOMMENDATIONS
please put an order in stating DNR
consider intervention and antacid - note patient has pacemaker
EKG
Come see the patient and maybe prescribe something for the dryness

## 2021-02-21 NOTE — PROVIDER CONTACT NOTE (OTHER) - ACTION/TREATMENT ORDERED:
MD made aware. Ordered EKG and troponins and CBC.
MD made aware. Second troponin was ordered and will see if patient needs to go to tele. Stated it seemed like the O2 wasn't getting a good reading. Will continue to monitor.
Will put the order in
Per VALENTINE Boland, perform EKG and will order antacid
will come to see the patient at some point in the night.

## 2021-02-22 DIAGNOSIS — B37.0 CANDIDAL STOMATITIS: ICD-10-CM

## 2021-02-22 LAB
ANION GAP SERPL CALC-SCNC: 8 MMOL/L — SIGNIFICANT CHANGE UP (ref 7–14)
BUN SERPL-MCNC: 21 MG/DL — SIGNIFICANT CHANGE UP (ref 7–23)
CALCIUM SERPL-MCNC: 7.9 MG/DL — LOW (ref 8.4–10.5)
CHLORIDE SERPL-SCNC: 95 MMOL/L — LOW (ref 98–107)
CO2 SERPL-SCNC: 25 MMOL/L — SIGNIFICANT CHANGE UP (ref 22–31)
CREAT SERPL-MCNC: 0.51 MG/DL — SIGNIFICANT CHANGE UP (ref 0.5–1.3)
GLUCOSE SERPL-MCNC: 119 MG/DL — HIGH (ref 70–99)
HCT VFR BLD CALC: 29.6 % — LOW (ref 34.5–45)
HGB BLD-MCNC: 9.7 G/DL — LOW (ref 11.5–15.5)
MCHC RBC-ENTMCNC: 30.7 PG — SIGNIFICANT CHANGE UP (ref 27–34)
MCHC RBC-ENTMCNC: 32.8 GM/DL — SIGNIFICANT CHANGE UP (ref 32–36)
MCV RBC AUTO: 93.7 FL — SIGNIFICANT CHANGE UP (ref 80–100)
NRBC # BLD: 0 /100 WBCS — SIGNIFICANT CHANGE UP
NRBC # FLD: 0 K/UL — SIGNIFICANT CHANGE UP
PLATELET # BLD AUTO: 292 K/UL — SIGNIFICANT CHANGE UP (ref 150–400)
POTASSIUM SERPL-MCNC: 4.3 MMOL/L — SIGNIFICANT CHANGE UP (ref 3.5–5.3)
POTASSIUM SERPL-SCNC: 4.3 MMOL/L — SIGNIFICANT CHANGE UP (ref 3.5–5.3)
RBC # BLD: 3.16 M/UL — LOW (ref 3.8–5.2)
RBC # FLD: 15.9 % — HIGH (ref 10.3–14.5)
SODIUM SERPL-SCNC: 128 MMOL/L — LOW (ref 135–145)
WBC # BLD: 20.74 K/UL — HIGH (ref 3.8–10.5)
WBC # FLD AUTO: 20.74 K/UL — HIGH (ref 3.8–10.5)

## 2021-02-22 PROCEDURE — 99233 SBSQ HOSP IP/OBS HIGH 50: CPT

## 2021-02-22 RX ADMIN — Medication 30 MILLILITER(S): at 09:38

## 2021-02-22 RX ADMIN — Medication 500000 UNIT(S): at 17:32

## 2021-02-22 RX ADMIN — RANOLAZINE 500 MILLIGRAM(S): 500 TABLET, FILM COATED, EXTENDED RELEASE ORAL at 11:42

## 2021-02-22 RX ADMIN — SIMVASTATIN 20 MILLIGRAM(S): 20 TABLET, FILM COATED ORAL at 23:00

## 2021-02-22 RX ADMIN — Medication 975 MILLIGRAM(S): at 14:16

## 2021-02-22 RX ADMIN — PANTOPRAZOLE SODIUM 40 MILLIGRAM(S): 20 TABLET, DELAYED RELEASE ORAL at 06:02

## 2021-02-22 RX ADMIN — PREGABALIN 1000 MICROGRAM(S): 225 CAPSULE ORAL at 11:41

## 2021-02-22 RX ADMIN — ONDANSETRON 4 MILLIGRAM(S): 8 TABLET, FILM COATED ORAL at 06:24

## 2021-02-22 RX ADMIN — Medication 81 MILLIGRAM(S): at 11:40

## 2021-02-22 RX ADMIN — Medication 975 MILLIGRAM(S): at 06:01

## 2021-02-22 RX ADMIN — LIDOCAINE 2 PATCH: 4 CREAM TOPICAL at 17:31

## 2021-02-22 RX ADMIN — Medication 500 MILLIGRAM(S): at 11:40

## 2021-02-22 RX ADMIN — Medication 500000 UNIT(S): at 07:34

## 2021-02-22 RX ADMIN — CLOPIDOGREL BISULFATE 75 MILLIGRAM(S): 75 TABLET, FILM COATED ORAL at 11:41

## 2021-02-22 RX ADMIN — Medication 100 MILLIGRAM(S): at 06:03

## 2021-02-22 RX ADMIN — DIPHENHYDRAMINE HYDROCHLORIDE AND LIDOCAINE HYDROCHLORIDE AND ALUMINUM HYDROXIDE AND MAGNESIUM HYDRO 5 MILLILITER(S): KIT at 17:32

## 2021-02-22 RX ADMIN — LIDOCAINE 2 PATCH: 4 CREAM TOPICAL at 23:00

## 2021-02-22 RX ADMIN — Medication 975 MILLIGRAM(S): at 22:59

## 2021-02-22 RX ADMIN — DIPHENHYDRAMINE HYDROCHLORIDE AND LIDOCAINE HYDROCHLORIDE AND ALUMINUM HYDROXIDE AND MAGNESIUM HYDRO 5 MILLILITER(S): KIT at 06:05

## 2021-02-22 RX ADMIN — Medication 3 MILLIGRAM(S): at 23:00

## 2021-02-22 RX ADMIN — ISOSORBIDE MONONITRATE 30 MILLIGRAM(S): 60 TABLET, EXTENDED RELEASE ORAL at 11:41

## 2021-02-22 RX ADMIN — Medication 1 TABLET(S): at 11:40

## 2021-02-22 RX ADMIN — SODIUM CHLORIDE 1 GRAM(S): 9 INJECTION INTRAMUSCULAR; INTRAVENOUS; SUBCUTANEOUS at 06:01

## 2021-02-22 RX ADMIN — LIDOCAINE 2 PATCH: 4 CREAM TOPICAL at 11:41

## 2021-02-22 RX ADMIN — SODIUM CHLORIDE 1 GRAM(S): 9 INJECTION INTRAMUSCULAR; INTRAVENOUS; SUBCUTANEOUS at 17:31

## 2021-02-22 RX ADMIN — CITALOPRAM 20 MILLIGRAM(S): 10 TABLET, FILM COATED ORAL at 11:41

## 2021-02-22 NOTE — DIETITIAN INITIAL EVALUATION ADULT. - CONTINUE CURRENT NUTRITION CARE PLAN
TRANSPORTED TO MS VIA WC ON O2 IN NO DISTRESS. 1) Recommend continue regular diet, fluid restriction per medical discretion.

## 2021-02-22 NOTE — DIETITIAN INITIAL EVALUATION ADULT. - ENERGY INTAKE
Pt reports poor PO intake in house secondary to oral discomfort. Per RN, pt dislikes sweet items (pudding), prefers applesauce. Current diet order inclusive of 1L fluid restriction per MD secondary to hyponatremia, plus 1mg salt tabs BID.

## 2021-02-22 NOTE — DIETITIAN INITIAL EVALUATION ADULT. - PHYSCIAL ASSESSMENT
Normal BMI Classification.  Pt appears underweight upon visualization, with consideration for age related muscle/fat loss.  No pressure injuries noted at this time.

## 2021-02-22 NOTE — PROGRESS NOTE ADULT - PROBLEM SELECTOR PLAN 1
-S/P left femur IMN on 2/13  - postop management per ortho, pain control, d/c IVF (avoid fluid overload with b/l pleural effusions, pro-bnp [id number], likely chf), incentive spirometry, DVT ppx  - dispo planning, PT recs rehab

## 2021-02-22 NOTE — DIETITIAN INITIAL EVALUATION ADULT. - ADD RECOMMEND
6) Continue micronutrient supplementation. 7) Monitor PO intake, tolerance to supplements, weight trends, BMs/GI distress, skin integrity, nutrition related lab values.

## 2021-02-22 NOTE — DIETITIAN INITIAL EVALUATION ADULT. - PERTINENT MEDS FT
ascorbic acid, calcium carbonate 1250 mG  + Vitamin D, cyanocobalamin, FIRST- Mouthwash BLM, nystatin Suspension, pantoprazole Tablet, senna, simvastatin, sodium chloride, aluminum hydroxide/magnesium hydroxide/simethicone Suspension PRN, bisacodyl Suppository PRN, magnesium hydroxide Suspension PRN

## 2021-02-22 NOTE — PROGRESS NOTE ADULT - PROBLEM SELECTOR PLAN 6
BP stable  - was soft previously, norvasc discontinued, imdur reduced to 30 mg qd  - caution with fluid given pleural effusion

## 2021-02-22 NOTE — DIETITIAN INITIAL EVALUATION ADULT. - OTHER INFO
Pt with complaint of tongue pain, difficulty swallowing, and new coughing with both solids/liquids; seen by ENT, oral candidiasis- started on FIRST/nystatin mouthwash. Pt with no current GI distress (nausea/vomiting/constipation/diarrhea), last BM 2/19 per flowsheeets; ordered for bowel regimen (senna, bisacodyl Suppository PRN).    Dosing weight 100 lbs (2/13), no recent history available via chart.

## 2021-02-22 NOTE — PROGRESS NOTE ADULT - SUBJECTIVE AND OBJECTIVE BOX
[facility] Division of Hospital Medicine  Jessica Wharton  Pager (M-F, 8A-5P): [phone]  Other Times:  [phone]    Patient is a 89y old  Female who presents with a chief complaint of left intertrochanteric fracture (19 Feb 2021 06:59)    SUBJECTIVE / OVERNIGHT EVENTS:  Patient states she has pain everywhere. Reports tongue pain.     MEDICATIONS  (STANDING):  acetaminophen   Tablet .. 975 milliGRAM(s) Oral every 8 hours  ascorbic acid 500 milliGRAM(s) Oral daily  aspirin enteric coated 81 milliGRAM(s) Oral daily  calcium carbonate 1250 mG  + Vitamin D (OsCal 500 + D) 1 Tablet(s) Oral daily  citalopram 20 milliGRAM(s) Oral daily  clopidogrel Tablet 75 milliGRAM(s) Oral daily  cyanocobalamin 1000 MICROGram(s) Oral daily  isosorbide   mononitrate ER Tablet (IMDUR) 30 milliGRAM(s) Oral daily  melatonin 3 milliGRAM(s) Oral at bedtime  metoprolol succinate  milliGRAM(s) Oral daily  pantoprazole    Tablet 40 milliGRAM(s) Oral before breakfast  ranolazine 500 milliGRAM(s) Oral daily  senna 2 Tablet(s) Oral at bedtime  simvastatin 20 milliGRAM(s) Oral at bedtime    MEDICATIONS  (PRN):  bisacodyl Suppository 10 milliGRAM(s) Rectal daily PRN If no bowel movement by POD#2  fentaNYL    Injectable 25 MICROGram(s) IV Push every 15 minutes PRN Severe Pain (7 - 10)  magnesium hydroxide Suspension 30 milliLiter(s) Oral daily PRN Constipation  ondansetron Injectable 4 milliGRAM(s) IV Push once PRN Nausea and/or Vomiting  traMADol 25 milliGRAM(s) Oral every 4 hours PRN Moderate Pain (4 - 6)  traMADol 50 milliGRAM(s) Oral every 4 hours PRN Severe Pain (7 - 10)      Vital Signs Last 24 Hrs  T(C): 36.9 (22 Feb 2021 09:36), Max: 37.2 (21 Feb 2021 18:24)  T(F): 98.5 (22 Feb 2021 09:36), Max: 98.9 (21 Feb 2021 18:24)  HR: 66 (22 Feb 2021 09:36) (63 - 69)  BP: 132/55 (22 Feb 2021 09:36) (125/59 - 142/54)  BP(mean): --  RR: 16 (22 Feb 2021 09:36) (16 - 18)  SpO2: 95% (22 Feb 2021 09:36) (95% - 99%)    I&O's Summary    20 Feb 2021 07:01  -  21 Feb 2021 07:00  --------------------------------------------------------  IN: 160 mL / OUT: 300 mL / NET: -140 mL    21 Feb 2021 07:01  -  21 Feb 2021 10:10  --------------------------------------------------------  IN: 0 mL / OUT: 100 mL / NET: -100 mL        PHYSICAL EXAM:  GENERAL: NAD, thin  HEAD:  Atraumatic, Normocephalic  EYES: EOMI, PERRLA, conjunctiva and sclera clear  NECK: Supple, No JVD  CHEST/LUNG: Clear to auscultation bilaterally; No wheeze  HEART: Regular rate and rhythm; No murmurs, rubs, or gallops  ABDOMEN: Soft, Nontender, Nondistended; Bowel sounds present  BACK: Non tender, ROM intact.  EXTREMITIES:  2+ Peripheral Pulses, No clubbing, cyanosis. LLE movement limited due to pain.  PSYCH: Cycling between calm and combativeness.  NEUROLOGY: AAOx1 - name  SKIN: Surgical dressing C/D/I    LABS:                          9.7    20.74 )-----------( 292      ( 22 Feb 2021 07:33 )             29.6   02-22    128<L>  |  95<L>  |  21  ----------------------------<  119<H>  4.3   |  25  |  0.51    Ca    7.9<L>      22 Feb 2021 07:36      RADIOLOGY & ADDITIONAL TESTS:    Imaging Personally Reviewed:    Care Discussed with Consultants/Other Providers: Ortho    Care Discussed with Orthopedic PA about:

## 2021-02-22 NOTE — DIETITIAN INITIAL EVALUATION ADULT. - ORAL NUTRITION SUPPLEMENTS
2) Continue Ensure Enlive 1 PO 2x daily (provides 350 kcal, 20 gm protein per 8oz serving). 3) RDN to provide Magic Cup ice cream 1 PO 2x daily (provides 290 kcal, 9 gm protein per 4oz serving).

## 2021-02-22 NOTE — PROGRESS NOTE ADULT - SUBJECTIVE AND OBJECTIVE BOX
Ortho Progress Note  EN ADHIKARI   [id number]    89yFemale is s/p L hip IMN POD#9 w/out any c/o.  Resting comfortably, NAD    Vital Signs Last 24 Hrs  T(C): 36.7 (22 Feb 2021 05:59), Max: 37.2 (21 Feb 2021 18:24)  T(F): 98 (22 Feb 2021 05:59), Max: 98.9 (21 Feb 2021 18:24)  HR: 63 (22 Feb 2021 05:59) (63 - 69)  BP: 136/60 (22 Feb 2021 05:59) (125/59 - 142/54)  BP(mean): --  RR: 18 (22 Feb 2021 05:59) (16 - 18)  SpO2: 97% (22 Feb 2021 05:59) (95% - 99%)  morphine (Other)  oxycodone (Unknown)  Percocet 5/325 (Unknown)  pilocarpine (Other)      S/P L hip IMN  L hip/thigh wound dressings gauze/tega's are C/D/I  motor LLE EHL/TA/GS grossly intact  sensation LLE intact to light touch        02-21    126<L>  |  91<L>  |  19  ----------------------------<  94  4.9   |  26  |  0.50    Ca    8.2<L>      21 Feb 2021 07:45          A/P Ortho Stable s/p L hip IMN POD#9  ck labs - Hyponatremia treated w/ salt tabs and PO fluid restriction  continue Aspirin/Plavix for DVT ppx  continue Physical Therapy BID: WBAT, OOB for gait training  ENT consult appreciated: patient on nystatin for oral thrush  discharge planning to rehab when medically stable

## 2021-02-22 NOTE — DIETITIAN INITIAL EVALUATION ADULT. - REASON FOR ADMISSION
Per chart, pt is 89 year old female, legally blind, PMHx CAD s/p PCI, CHB s/p PPM, carotid stenosis s/p CEA, HTN, HLD, CLL, presenting with L hip pain s/p unwitnessed mechanical fall, found to have acute L intertrochanteric fracture s/p OR (2/13), course complicated by hyponatremia.

## 2021-02-23 LAB
ALBUMIN SERPL ELPH-MCNC: 2.9 G/DL — LOW (ref 3.3–5)
ANION GAP SERPL CALC-SCNC: 7 MMOL/L — SIGNIFICANT CHANGE UP (ref 7–14)
BUN SERPL-MCNC: 16 MG/DL — SIGNIFICANT CHANGE UP (ref 7–23)
CALCIUM SERPL-MCNC: 8 MG/DL — LOW (ref 8.4–10.5)
CHLORIDE SERPL-SCNC: 95 MMOL/L — LOW (ref 98–107)
CO2 SERPL-SCNC: 23 MMOL/L — SIGNIFICANT CHANGE UP (ref 22–31)
CREAT SERPL-MCNC: 0.44 MG/DL — LOW (ref 0.5–1.3)
GLUCOSE SERPL-MCNC: 98 MG/DL — SIGNIFICANT CHANGE UP (ref 70–99)
OSMOLALITY UR: 735 MOSM/KG — SIGNIFICANT CHANGE UP (ref 50–1200)
POTASSIUM SERPL-MCNC: 4.8 MMOL/L — SIGNIFICANT CHANGE UP (ref 3.5–5.3)
POTASSIUM SERPL-SCNC: 4.8 MMOL/L — SIGNIFICANT CHANGE UP (ref 3.5–5.3)
SARS-COV-2 RNA SPEC QL NAA+PROBE: SIGNIFICANT CHANGE UP
SODIUM SERPL-SCNC: 125 MMOL/L — LOW (ref 135–145)
SODIUM UR-SCNC: 193 MMOL/L — SIGNIFICANT CHANGE UP

## 2021-02-23 PROCEDURE — 99233 SBSQ HOSP IP/OBS HIGH 50: CPT

## 2021-02-23 RX ORDER — TOLVAPTAN 15 MG/1
15 TABLET ORAL ONCE
Refills: 0 | Status: COMPLETED | OUTPATIENT
Start: 2021-02-23 | End: 2021-02-23

## 2021-02-23 RX ADMIN — Medication 500000 UNIT(S): at 05:18

## 2021-02-23 RX ADMIN — Medication 500000 UNIT(S): at 18:11

## 2021-02-23 RX ADMIN — Medication 500 MILLIGRAM(S): at 13:51

## 2021-02-23 RX ADMIN — PREGABALIN 1000 MICROGRAM(S): 225 CAPSULE ORAL at 13:52

## 2021-02-23 RX ADMIN — RANOLAZINE 500 MILLIGRAM(S): 500 TABLET, FILM COATED, EXTENDED RELEASE ORAL at 13:53

## 2021-02-23 RX ADMIN — LIDOCAINE 2 PATCH: 4 CREAM TOPICAL at 13:53

## 2021-02-23 RX ADMIN — LIDOCAINE 2 PATCH: 4 CREAM TOPICAL at 18:10

## 2021-02-23 RX ADMIN — PANTOPRAZOLE SODIUM 40 MILLIGRAM(S): 20 TABLET, DELAYED RELEASE ORAL at 05:18

## 2021-02-23 RX ADMIN — Medication 1 TABLET(S): at 13:51

## 2021-02-23 RX ADMIN — Medication 975 MILLIGRAM(S): at 13:51

## 2021-02-23 RX ADMIN — Medication 3 MILLIGRAM(S): at 23:59

## 2021-02-23 RX ADMIN — Medication 975 MILLIGRAM(S): at 23:00

## 2021-02-23 RX ADMIN — CLOPIDOGREL BISULFATE 75 MILLIGRAM(S): 75 TABLET, FILM COATED ORAL at 13:52

## 2021-02-23 RX ADMIN — ISOSORBIDE MONONITRATE 30 MILLIGRAM(S): 60 TABLET, EXTENDED RELEASE ORAL at 13:52

## 2021-02-23 RX ADMIN — BENZOCAINE AND MENTHOL 1 LOZENGE: 5; 1 LIQUID ORAL at 05:12

## 2021-02-23 RX ADMIN — DIPHENHYDRAMINE HYDROCHLORIDE AND LIDOCAINE HYDROCHLORIDE AND ALUMINUM HYDROXIDE AND MAGNESIUM HYDRO 5 MILLILITER(S): KIT at 05:17

## 2021-02-23 RX ADMIN — CITALOPRAM 20 MILLIGRAM(S): 10 TABLET, FILM COATED ORAL at 13:52

## 2021-02-23 RX ADMIN — TOLVAPTAN 15 MILLIGRAM(S): 15 TABLET ORAL at 19:57

## 2021-02-23 RX ADMIN — Medication 100 MILLIGRAM(S): at 05:18

## 2021-02-23 RX ADMIN — DIPHENHYDRAMINE HYDROCHLORIDE AND LIDOCAINE HYDROCHLORIDE AND ALUMINUM HYDROXIDE AND MAGNESIUM HYDRO 5 MILLILITER(S): KIT at 18:11

## 2021-02-23 RX ADMIN — Medication 81 MILLIGRAM(S): at 13:51

## 2021-02-23 NOTE — CONSULT NOTE ADULT - ASSESSMENT
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  There is a component of tea and toast diet     1 Renal- Will give Samsca 15mg po x 1 now;  Simple fluid restriction will not work and honestly im not sure the pt will even understand  SMA7 in am   I suspect there is a component of reset osmoles as well(serum sodium on admission was 131meq)    2 GI-If possible encourage po protein intake     3 Ortho- DC planning pending improving serum sodium in am     Sayed [facility]   [phone]

## 2021-02-23 NOTE — PROGRESS NOTE ADULT - SUBJECTIVE AND OBJECTIVE BOX
[facility] Division of Hospital Medicine  Jessica Wharton  Pager (M-F, 8A-5P): [phone]  Other Times:  [phone]    Patient is a 89y old  Female who presents with a chief complaint of left intertrochanteric fracture (19 Feb 2021 06:59)    SUBJECTIVE / OVERNIGHT EVENTS: Seen and examined. Reports tongue pain. Overall poor historian - reports pain all over.     MEDICATIONS  (STANDING):  acetaminophen   Tablet .. 975 milliGRAM(s) Oral every 8 hours  ascorbic acid 500 milliGRAM(s) Oral daily  aspirin enteric coated 81 milliGRAM(s) Oral daily  calcium carbonate 1250 mG  + Vitamin D (OsCal 500 + D) 1 Tablet(s) Oral daily  citalopram 20 milliGRAM(s) Oral daily  clopidogrel Tablet 75 milliGRAM(s) Oral daily  cyanocobalamin 1000 MICROGram(s) Oral daily  isosorbide   mononitrate ER Tablet (IMDUR) 30 milliGRAM(s) Oral daily  melatonin 3 milliGRAM(s) Oral at bedtime  metoprolol succinate  milliGRAM(s) Oral daily  pantoprazole    Tablet 40 milliGRAM(s) Oral before breakfast  ranolazine 500 milliGRAM(s) Oral daily  senna 2 Tablet(s) Oral at bedtime  simvastatin 20 milliGRAM(s) Oral at bedtime    MEDICATIONS  (PRN):  bisacodyl Suppository 10 milliGRAM(s) Rectal daily PRN If no bowel movement by POD#2  fentaNYL    Injectable 25 MICROGram(s) IV Push every 15 minutes PRN Severe Pain (7 - 10)  magnesium hydroxide Suspension 30 milliLiter(s) Oral daily PRN Constipation  ondansetron Injectable 4 milliGRAM(s) IV Push once PRN Nausea and/or Vomiting  traMADol 25 milliGRAM(s) Oral every 4 hours PRN Moderate Pain (4 - 6)  traMADol 50 milliGRAM(s) Oral every 4 hours PRN Severe Pain (7 - 10)    Vital Signs Last 24 Hrs  T(C): 36.6 (23 Feb 2021 09:30), Max: 36.9 (22 Feb 2021 17:30)  T(F): 97.8 (23 Feb 2021 09:30), Max: 98.5 (22 Feb 2021 17:30)  HR: 64 (23 Feb 2021 09:30) (64 - 66)  BP: 142/56 (23 Feb 2021 09:30) (120/54 - 145/54)  BP(mean): --  RR: 16 (23 Feb 2021 09:30) (16 - 18)  SpO2: 96% (23 Feb 2021 09:30) (95% - 98%)    I&O's Summary    20 Feb 2021 07:01  -  21 Feb 2021 07:00  --------------------------------------------------------  IN: 160 mL / OUT: 300 mL / NET: -140 mL    21 Feb 2021 07:01  -  21 Feb 2021 10:10  --------------------------------------------------------  IN: 0 mL / OUT: 100 mL / NET: -100 mL        PHYSICAL EXAM:  GENERAL: NAD, thin  HEAD:  Atraumatic, Normocephalic  EYES: EOMI, PERRLA, conjunctiva and sclera clear  NECK: Supple, No JVD  CHEST/LUNG: Clear to auscultation bilaterally; No wheeze  HEART: Regular rate and rhythm; No murmurs, rubs, or gallops  ABDOMEN: Soft, Nontender, Nondistended; Bowel sounds present  BACK: Non tender, ROM intact.  EXTREMITIES:  2+ Peripheral Pulses, No clubbing, cyanosis. LLE movement limited due to pain.  PSYCH: Cycling between calm and combativeness.  NEUROLOGY: AAOx1 - name  SKIN: Surgical dressing C/D/I    LABS:                          9.7    20.74 )-----------( 292      ( 22 Feb 2021 07:33 )             29.6   02-23    125<L>  |  95<L>  |  16  ----------------------------<  98  4.8   |  23  |  0.44<L>    Ca    8.0<L>      23 Feb 2021 07:24    TPro  x   /  Alb  2.9<L>  /  TBili  x   /  DBili  x   /  AST  x   /  ALT  x   /  AlkPhos  x   02-23        RADIOLOGY & ADDITIONAL TESTS:    Imaging Personally Reviewed:    Care Discussed with Consultants/Other Providers: Ortho    Care Discussed with Orthopedic PA about:

## 2021-02-23 NOTE — CONSULT NOTE ADULT - REASON FOR ADMISSION
left intertrochanteric fracture

## 2021-02-23 NOTE — PROGRESS NOTE ADULT - SUBJECTIVE AND OBJECTIVE BOX
Patient with Oral candidiasis on Nystatin. Still c/o some oral dysphagia with improvement after starting Nystatin. Denies any other new complaints.     AVSS  HEENT:  Mouth: No injection, Mild exudate dorsal tongue. No injection.   No floor of mouth edema.

## 2021-02-23 NOTE — PROGRESS NOTE ADULT - SUBJECTIVE AND OBJECTIVE BOX
Patient is seen and examined at bedside. Resting in bed without complaints. No significant overnight events.      Vital Signs Last 24 Hrs  T(C): 36.5 (23 Feb 2021 05:16), Max: 36.9 (22 Feb 2021 09:36)  T(F): 97.7 (23 Feb 2021 05:16), Max: 98.5 (22 Feb 2021 09:36)  HR: 65 (23 Feb 2021 05:16) (65 - 66)  BP: 145/54 (23 Feb 2021 05:16) (120/54 - 145/54)  BP(mean): --  RR: 17 (23 Feb 2021 05:16) (16 - 18)  SpO2: 95% (23 Feb 2021 05:16) (95% - 98%)    Gen: NAD    LLE:  L hip/thigh wound dressings CDI  motor LLE EHL/TA/GS grossly intact  sensation LLE intact to light touch    Labs:                        9.7    20.74 )-----------( 292      ( 22 Feb 2021 07:33 )             29.6     02-22    128<L>  |  95<L>  |  21  ----------------------------<  119<H>  4.3   |  25  |  0.51    Ca    7.9<L>      22 Feb 2021 07:36        A/P: Patient is a 89y y/o Female s/p L Hip IMN     -Hyponatremia treated w/ salt tabs and PO fluid restriction - cleared for rehab from medical standpoint with current sodium levels  -continue Aspirin/Plavix for DVT ppx  -continue Physical Therapy BID: WBAT, OOB for gait training  -ENT consult appreciated: patient on nystatin for oral thrush  -discharge planning to rehab when medically stable

## 2021-02-23 NOTE — CONSULT NOTE ADULT - SUBJECTIVE AND OBJECTIVE BOX
NEPHROLOGY - NSN    Patient seen and examined.    HPI:  89 y/o Female A&O x3, legally blind with PMH of CAD S/P stent placement, CHB S/P PPM placement, carotid stenosis s/p carotid endarterectomy, HTN, HLD, CLL, s/p intramedullary nailing of right/left femur Right hip in 2012 with Dr. Blake presents to [facility] with c/o L hip pain s/p unwitnessed mechanical fall. Pt states she is unsure of when she fell, either yesterday or this morning. States she lives with her sister and her niece but they didn't see her fall, niece assisted her after fall. Spoke to Angelito (grand-nephew) regarding great aunt since he makes all her medical decisions. States that she fell on Sunday but was able to ambulate after, then they believe she fell this morning at 8AM and since has been unable to ambulate, and was complaining of pain to the left hip. Pt denies fevers/chills, headstrike or LOC, numbness/tingling or any other orthopedic pain/injury. At baseline, pt ambulates with walker and assistance from home aide.      PAST MEDICAL & SURGICAL HISTORY:  Legally blind  Complete heart block  CAD  HTN  Hyperlipemia  Pulmonary embolism  Cancer of Uterus  CLL (Chronic Lymphoblastic Leukemia)    ORIF right femur fracture, 2012  H/O bilateral cataract extraction  MAXX-BSO  History of tonsillectomy  retina and glaucoma surgery left eye -here 12/11, 2/12 and 3/12  baerveldt implant left eye  History of Carotid Endarterectomy on L 2009  Coronary Stent 2006 x 3 LAD, Circ, RCA  Cardiac Pacemaker 2009  right cornea implant      Allergies:  morphine (Other)  oxycodone (Unknown)  Percocet 5/325 (Unknown)  pilocarpine (Other)                   11.5   21.49 )-----------( 233      ( 12 Feb 2021 11:46 )             36.1     12 Feb 2021 11:46    131    |  96     |  16     ----------------------------<  128    4.5     |  25     |  0.54     Ca    8.3        12 Feb 2021 11:46  Mg     1.8       12 Feb 2021 11:46    TPro  6.1    /  Alb  3.6    /  TBili  0.8    /  DBili  x      /  AST  16     /  ALT  10     /  AlkPhos  53     12 Feb 2021 11:46    PT/INR - ( 12 Feb 2021 11:46 )   PT: 13.2 sec;   INR: 1.17 ratio         PTT - ( 12 Feb 2021 11:46 )  PTT:29.5 sec  Vital Signs Last 24 Hrs  T(C): 36.8 (02-12-21 @ 14:00), Max: 36.9 (02-12-21 @ 10:27)  T(F): 98.3 (02-12-21 @ 14:00), Max: 98.5 (02-12-21 @ 10:27)  HR: 83 (02-12-21 @ 14:00) (63 - 83)  BP: 118/41 (02-12-21 @ 14:00) (118/41 - 172/76)  BP(mean): --  RR: 16 (02-12-21 @ 14:00) (16 - 16)  SpO2: 98% (02-12-21 @ 14:00) (96% - 100%)      Imaging: XR demonstates L intertrochanteric fracture  < from: CT Head No Cont (02.12.21 @ 16:20) >  EXAM: CT BRAIN    EXAM:  CT CERVICAL SPINE   PROCEDURE DATE:  Feb 12 2021     IMPRESSION:  CT Head: No acute intracranial hemorrhage, midline shift or mass effect. Chronic changes as above.  CT Cervical Spine: No acute fracture or dislocation. Chronic changes as above.  < end of copied text >    < from: CT Chest w/ IV Cont (02.12.21 @ 16:20) >  EXAM:  CT CHEST IC    EXAM:  CT ABDOMEN AND PELVIS IC    PROCEDURE DATE:  Feb 12 2021       IMPRESSION:  Partially loculated small left and moderate right-sided pleural effusions.  Acute left comminuted intertrochanteric fracture.  Indeterminant splenic lesion.  Filling defect in the right distal femoral artery, possibly occlusion or artifact.      < from: Xray Femur 1 View, Left (02.12.21 @ 15:48) >  EXAM:  XR HIP 2-3V RT    EXAM:  XR FEMUR 2 VIEWS RT    EXAM:  RAD PELVIS AP ONLY    EXAM:  XR FEMUR 1 VIEW LT   PROCEDURE DATE:  Feb 12 2021     IMPRESSION:  Redemonstrated varus angulated proximal left femoral intertrochanteric fracture with lesser trochanteric avulsion.  Intact and uncomplicated right femoral short IM aleks/nail with proximal compression screw and distal interlocking screw and with underlying anatomic alignment grossly maintained.  No dislocationsor additional fractures in remaining imaged regions.  Intact pelvic and obturator rings.  Preserved bilateral hip joint spaces. Bilateral knee osteoarthritis and bilateral hip pubic symphysis and knee chondrocalcinosis.  Generalized osteopenia otherwise no discrete lytic or blastic lesions.  Vascular calcifications.      < from: Xray Shoulder 2 Views, Right (02.12.21 @ 15:46) >  EXAM:  RAD SHOULDER RT    PROCEDURE DATE:  Feb 12 2021     No fractures, dislocations, or AC separation.  Moderate AC and moderate glenohumeral joint osteoarthrosis.        PAST MEDICAL & SURGICAL HISTORY:  Legally blind    Complete heart block    Hyperlipemia    Pulmonary embolism    Cancer of Uterus    Osteoarthritis    Cataract    Osteoporosis    Glaucoma, Narrow-Angle    Dyslipidemia    CLL (Chronic Lymphoblastic Yosef    Coronary Artery Disease    HTN    Closed fracture of femur  h/o ORIF right femur fracture, 2012    H/O bilateral cataract extraction    History of total abdominal hysterectomy  MAXX-BSO    History of tonsillectomy  as a child    Retina  retina and glaucoma surgery left eye -here 12/11, 2/12 and 3/12    baerveldt implant left eye    History of Carotid Endarterectomy  on L 2009    Coronary Stent  2006 x 3 LAD, Circ, RCA  2012 GRETA placed    History of Arthroscopic Knee S  right knee x 2    Cardiac Pacemaker  2009    Dystrophy, Cornea  right cornea implant        MEDICATIONS  (STANDING):  acetaminophen   Tablet .. 975 milliGRAM(s) Oral every 8 hours  ascorbic acid 500 milliGRAM(s) Oral daily  aspirin enteric coated 81 milliGRAM(s) Oral daily  calcium carbonate 1250 mG  + Vitamin D (OsCal 500 + D) 1 Tablet(s) Oral daily  citalopram 20 milliGRAM(s) Oral daily  clopidogrel Tablet 75 milliGRAM(s) Oral daily  cyanocobalamin 1000 MICROGram(s) Oral daily  FIRST- Mouthwash  BLM 5 milliLiter(s) Swish and Spit two times a day  isosorbide   mononitrate ER Tablet (IMDUR) 30 milliGRAM(s) Oral daily  lidocaine   Patch 2 Patch Transdermal daily  melatonin 3 milliGRAM(s) Oral at bedtime  metoprolol succinate  milliGRAM(s) Oral daily  nystatin    Suspension 841248 Unit(s) Oral two times a day  pantoprazole    Tablet 40 milliGRAM(s) Oral before breakfast  ranolazine 500 milliGRAM(s) Oral daily  senna 2 Tablet(s) Oral at bedtime  simvastatin 20 milliGRAM(s) Oral at bedtime      Allergies    morphine (Other)  oxycodone (Unknown)  Percocet 5/325 (Unknown)  pilocarpine (Other)    Intolerances        SOCIAL HISTORY:  Denies alcohol abuse, drug abuse or tobacco usage.     FAMILY HISTORY:  No pertinent family history in first degree relatives        VITALS:  T(C): 36.6 (02-23-21 @ 09:30), Max: 36.9 (02-22-21 @ 17:30)  HR: 64 (02-23-21 @ 09:30) (64 - 66)  BP: 142/56 (02-23-21 @ 09:30) (120/54 - 145/54)  RR: 16 (02-23-21 @ 09:30) (16 - 18)  SpO2: 96% (02-23-21 @ 09:30) (95% - 98%)    REVIEW OF SYSTEMS:  Denies any nausea, vomiting, diarrhea, fever or chills. Denies chest pain, SOB, focal weakness, hematuria or dysuria. Good oral intake and denies fatigue or weakness. All other pertinent systems are reviewed and are negative.    PHYSICAL EXAM:  Constitutional: NAD  HEENT: EOMI  Neck:  No JVD, supple   Respiratory: CTA B/L  Cardiovascular: S1 and S2, RRR  Gastrointestinal: + BS, soft, NT, ND  Extremities: No peripheral edema, + peripheral pulses  Neurological: A/O x 3, CN2-12 intact  Psychiatric: Normal mood, normal affect  : No Velasquez  Skin: No rashes, C/D/I  Access: Not applicable    I and O's:    02-21 @ 07:01  -  02-22 @ 07:00  --------------------------------------------------------  IN: 0 mL / OUT: 100 mL / NET: -100 mL    02-22 @ 07:01  -  02-23 @ 07:00  --------------------------------------------------------  IN: 0 mL / OUT: 320 mL / NET: -320 mL    02-23 @ 07:01  -  02-23 @ 11:34  --------------------------------------------------------  IN: 0 mL / OUT: 100 mL / NET: -100 mL          LABS:                        9.7    20.74 )-----------( 292      ( 22 Feb 2021 07:33 )             29.6     02-23    125<L>  |  95<L>  |  16  ----------------------------<  98  4.8   |  23  |  0.44<L>    Ca    8.0<L>      23 Feb 2021 07:24    TPro  x   /  Alb  2.9<L>  /  TBili  x   /  DBili  x   /  AST  x   /  ALT  x   /  AlkPhos  x   02-23      URINE:    Sodium, Random Urine: 193 mmol/L (02-23 @ 10:38)    RADIOLOGY & ADDITIONAL STUDIES:    ASSESSMENT    RECOMMEND    Katerina Duran NP  [facility],   [phone]   NEPHROLOGY - NSN    Patient seen and examined.    HPI:  89 y/o Female A&O x3, legally blind with PMH of CAD S/P stent placement, CHB S/P PPM placement, carotid stenosis s/p carotid endarterectomy, HTN, HLD, CLL, s/p intramedullary nailing of right/left femur Right hip in 2012 with Dr. Blake presents to [facility] with c/o L hip pain s/p unwitnessed mechanical fall. Pt states she is unsure of when she fell, either yesterday or this morning. States she lives with her sister and her niece but they didn't see her fall, niece assisted her after fall. Spoke to Angelito (grand-nephew) regarding great aunt since he makes all her medical decisions. States that she fell on Sunday but was able to ambulate after, then they believe she fell this morning at 8AM and since has been unable to ambulate, and was complaining of pain to the left hip. Pt denies fevers/chills, headstrike or LOC, numbness/tingling or any other orthopedic pain/injury. At baseline, pt ambulates with walker and assistance from home aide.  All hx from the chart       PAST MEDICAL & SURGICAL HISTORY:  Legally blind  Complete heart block  CAD  HTN  Hyperlipemia  Pulmonary embolism  Cancer of Uterus  CLL (Chronic Lymphoblastic Leukemia)    ORIF right femur fracture, 2012  H/O bilateral cataract extraction  MAXX-BSO  History of tonsillectomy  retina and glaucoma surgery left eye -here 12/11, 2/12 and 3/12  baerveldt implant left eye  History of Carotid Endarterectomy on L 2009  Coronary Stent 2006 x 3 LAD, Circ, RCA  Cardiac Pacemaker 2009  right cornea implant      Allergies:  morphine (Other)  oxycodone (Unknown)  Percocet 5/325 (Unknown)  pilocarpine (Other)                   11.5   21.49 )-----------( 233      ( 12 Feb 2021 11:46 )             36.1     12 Feb 2021 11:46    131    |  96     |  16     ----------------------------<  128    4.5     |  25     |  0.54     Ca    8.3        12 Feb 2021 11:46  Mg     1.8       12 Feb 2021 11:46    TPro  6.1    /  Alb  3.6    /  TBili  0.8    /  DBili  x      /  AST  16     /  ALT  10     /  AlkPhos  53     12 Feb 2021 11:46    PT/INR - ( 12 Feb 2021 11:46 )   PT: 13.2 sec;   INR: 1.17 ratio         PTT - ( 12 Feb 2021 11:46 )  PTT:29.5 sec  Vital Signs Last 24 Hrs  T(C): 36.8 (02-12-21 @ 14:00), Max: 36.9 (02-12-21 @ 10:27)  T(F): 98.3 (02-12-21 @ 14:00), Max: 98.5 (02-12-21 @ 10:27)  HR: 83 (02-12-21 @ 14:00) (63 - 83)  BP: 118/41 (02-12-21 @ 14:00) (118/41 - 172/76)  BP(mean): --  RR: 16 (02-12-21 @ 14:00) (16 - 16)  SpO2: 98% (02-12-21 @ 14:00) (96% - 100%)      Imaging: XR demonstates L intertrochanteric fracture  < from: CT Head No Cont (02.12.21 @ 16:20) >  EXAM: CT BRAIN    EXAM:  CT CERVICAL SPINE   PROCEDURE DATE:  Feb 12 2021     IMPRESSION:  CT Head: No acute intracranial hemorrhage, midline shift or mass effect. Chronic changes as above.  CT Cervical Spine: No acute fracture or dislocation. Chronic changes as above.  < end of copied text >    < from: CT Chest w/ IV Cont (02.12.21 @ 16:20) >  EXAM:  CT CHEST IC    EXAM:  CT ABDOMEN AND PELVIS IC    PROCEDURE DATE:  Feb 12 2021       IMPRESSION:  Partially loculated small left and moderate right-sided pleural effusions.  Acute left comminuted intertrochanteric fracture.  Indeterminant splenic lesion.  Filling defect in the right distal femoral artery, possibly occlusion or artifact.      < from: Xray Femur 1 View, Left (02.12.21 @ 15:48) >  EXAM:  XR HIP 2-3V RT    EXAM:  XR FEMUR 2 VIEWS RT    EXAM:  RAD PELVIS AP ONLY    EXAM:  XR FEMUR 1 VIEW LT   PROCEDURE DATE:  Feb 12 2021     IMPRESSION:  Redemonstrated varus angulated proximal left femoral intertrochanteric fracture with lesser trochanteric avulsion.  Intact and uncomplicated right femoral short IM aleks/nail with proximal compression screw and distal interlocking screw and with underlying anatomic alignment grossly maintained.  No dislocationsor additional fractures in remaining imaged regions.  Intact pelvic and obturator rings.  Preserved bilateral hip joint spaces. Bilateral knee osteoarthritis and bilateral hip pubic symphysis and knee chondrocalcinosis.  Generalized osteopenia otherwise no discrete lytic or blastic lesions.  Vascular calcifications.      < from: Xray Shoulder 2 Views, Right (02.12.21 @ 15:46) >  EXAM:  RAD SHOULDER RT    PROCEDURE DATE:  Feb 12 2021     No fractures, dislocations, or AC separation.  Moderate AC and moderate glenohumeral joint osteoarthrosis.        PAST MEDICAL & SURGICAL HISTORY:  Legally blind    Complete heart block    Hyperlipemia    Pulmonary embolism    Cancer of Uterus    Osteoarthritis    Cataract    Osteoporosis    Glaucoma, Narrow-Angle    Dyslipidemia    CLL (Chronic Lymphoblastic Yosef    Coronary Artery Disease    HTN    Closed fracture of femur  h/o ORIF right femur fracture, 2012    H/O bilateral cataract extraction    History of total abdominal hysterectomy  MAXX-BSO    History of tonsillectomy  as a child    Retina  retina and glaucoma surgery left eye -here 12/11, 2/12 and 3/12    baerveldt implant left eye    History of Carotid Endarterectomy  on L 2009    Coronary Stent  2006 x 3 LAD, Circ, RCA  2012 GRETA placed    History of Arthroscopic Knee S  right knee x 2    Cardiac Pacemaker  2009    Dystrophy, Cornea  right cornea implant        MEDICATIONS  (STANDING):  acetaminophen   Tablet .. 975 milliGRAM(s) Oral every 8 hours  ascorbic acid 500 milliGRAM(s) Oral daily  aspirin enteric coated 81 milliGRAM(s) Oral daily  calcium carbonate 1250 mG  + Vitamin D (OsCal 500 + D) 1 Tablet(s) Oral daily  citalopram 20 milliGRAM(s) Oral daily  clopidogrel Tablet 75 milliGRAM(s) Oral daily  cyanocobalamin 1000 MICROGram(s) Oral daily  FIRST- Mouthwash  BLM 5 milliLiter(s) Swish and Spit two times a day  isosorbide   mononitrate ER Tablet (IMDUR) 30 milliGRAM(s) Oral daily  lidocaine   Patch 2 Patch Transdermal daily  melatonin 3 milliGRAM(s) Oral at bedtime  metoprolol succinate  milliGRAM(s) Oral daily  nystatin    Suspension 555259 Unit(s) Oral two times a day  pantoprazole    Tablet 40 milliGRAM(s) Oral before breakfast  ranolazine 500 milliGRAM(s) Oral daily  senna 2 Tablet(s) Oral at bedtime  simvastatin 20 milliGRAM(s) Oral at bedtime      Allergies    morphine (Other)  oxycodone (Unknown)  Percocet 5/325 (Unknown)  pilocarpine (Other)    Intolerances        SOCIAL HISTORY:  Denies alcohol abuse, drug abuse or tobacco usage.     FAMILY HISTORY:  No pertinent family history in first degree relatives        VITALS:  T(C): 36.6 (02-23-21 @ 09:30), Max: 36.9 (02-22-21 @ 17:30)  HR: 64 (02-23-21 @ 09:30) (64 - 66)  BP: 142/56 (02-23-21 @ 09:30) (120/54 - 145/54)  RR: 16 (02-23-21 @ 09:30) (16 - 18)  SpO2: 96% (02-23-21 @ 09:30) (95% - 98%)    REVIEW OF SYSTEMS:  Denies any nausea, vomiting, diarrhea, fever or chills. Denies chest pain, SOB, focal weakness, hematuria or dysuria. Good oral intake and denies fatigue or weakness. All other pertinent systems are reviewed and are negative.    PHYSICAL EXAM:  Constitutional: NAD  HEENT: EOMI  Neck:  No JVD, supple   Respiratory: CTA B/L  Cardiovascular: S1 and S2, RRR  Gastrointestinal: + BS, soft, NT, ND  Extremities: No peripheral edema, + peripheral pulses  Neurological: A/O x 3, CN2-12 intact  Psychiatric: Normal mood, normal affect  : No Velasquez  Skin: No rashes, C/D/I  Access: Not applicable    I and O's:    02-21 @ 07:01  -  02-22 @ 07:00  --------------------------------------------------------  IN: 0 mL / OUT: 100 mL / NET: -100 mL    02-22 @ 07:01  -  02-23 @ 07:00  --------------------------------------------------------  IN: 0 mL / OUT: 320 mL / NET: -320 mL    02-23 @ 07:01  -  02-23 @ 11:34  --------------------------------------------------------  IN: 0 mL / OUT: 100 mL / NET: -100 mL          LABS:                        9.7    20.74 )-----------( 292      ( 22 Feb 2021 07:33 )             29.6     02-23    125<L>  |  95<L>  |  16  ----------------------------<  98  4.8   |  23  |  0.44<L>    Ca    8.0<L>      23 Feb 2021 07:24    TPro  x   /  Alb  2.9<L>  /  TBili  x   /  DBili  x   /  AST  x   /  ALT  x   /  AlkPhos  x   02-23      URINE:    Sodium, Random Urine: 193 mmol/L (02-23 @ 10:38)    RADIOLOGY & ADDITIONAL STUDIES:    ASSESSMENT    RECOMMEND    Katerina Duran NP  [facility]  [phone]

## 2021-02-23 NOTE — PROGRESS NOTE ADULT - PROBLEM SELECTOR PLAN 1
1. Continue with Nystatin swish and spit x 7 days.   2. Continue with Humidified face tent.  3. Will follow

## 2021-02-24 VITALS
HEART RATE: 72 BPM | OXYGEN SATURATION: 99 % | TEMPERATURE: 98 F | RESPIRATION RATE: 17 BRPM | DIASTOLIC BLOOD PRESSURE: 61 MMHG | SYSTOLIC BLOOD PRESSURE: 141 MMHG

## 2021-02-24 DIAGNOSIS — E87.1 HYPO-OSMOLALITY AND HYPONATREMIA: ICD-10-CM

## 2021-02-24 LAB
ALBUMIN SERPL ELPH-MCNC: 3.1 G/DL — LOW (ref 3.3–5)
ANION GAP SERPL CALC-SCNC: 6 MMOL/L — LOW (ref 7–14)
BUN SERPL-MCNC: 16 MG/DL — SIGNIFICANT CHANGE UP (ref 7–23)
CALCIUM SERPL-MCNC: 8.5 MG/DL — SIGNIFICANT CHANGE UP (ref 8.4–10.5)
CHLORIDE SERPL-SCNC: 97 MMOL/L — LOW (ref 98–107)
CO2 SERPL-SCNC: 28 MMOL/L — SIGNIFICANT CHANGE UP (ref 22–31)
CREAT SERPL-MCNC: 0.58 MG/DL — SIGNIFICANT CHANGE UP (ref 0.5–1.3)
GLUCOSE SERPL-MCNC: 105 MG/DL — HIGH (ref 70–99)
POTASSIUM SERPL-MCNC: 4.8 MMOL/L — SIGNIFICANT CHANGE UP (ref 3.5–5.3)
POTASSIUM SERPL-SCNC: 4.8 MMOL/L — SIGNIFICANT CHANGE UP (ref 3.5–5.3)
SODIUM SERPL-SCNC: 131 MMOL/L — LOW (ref 135–145)

## 2021-02-24 PROCEDURE — 99232 SBSQ HOSP IP/OBS MODERATE 35: CPT

## 2021-02-24 RX ORDER — DIPHENHYDRAMINE HYDROCHLORIDE AND LIDOCAINE HYDROCHLORIDE AND ALUMINUM HYDROXIDE AND MAGNESIUM HYDRO
5 KIT
Qty: 0 | Refills: 0 | DISCHARGE
Start: 2021-02-24

## 2021-02-24 RX ORDER — AMLODIPINE BESYLATE 2.5 MG/1
1 TABLET ORAL
Qty: 0 | Refills: 0 | DISCHARGE

## 2021-02-24 RX ORDER — NYSTATIN 500MM UNIT
5 POWDER (EA) MISCELLANEOUS
Qty: 0 | Refills: 0 | DISCHARGE
Start: 2021-02-24

## 2021-02-24 RX ORDER — SODIUM CHLORIDE 9 MG/ML
1 INJECTION INTRAMUSCULAR; INTRAVENOUS; SUBCUTANEOUS
Refills: 0 | Status: DISCONTINUED | OUTPATIENT
Start: 2021-02-24 | End: 2021-02-24

## 2021-02-24 RX ORDER — SODIUM CHLORIDE 9 MG/ML
1 INJECTION INTRAMUSCULAR; INTRAVENOUS; SUBCUTANEOUS
Qty: 0 | Refills: 0 | DISCHARGE
Start: 2021-02-24

## 2021-02-24 RX ADMIN — Medication 1 TABLET(S): at 13:39

## 2021-02-24 RX ADMIN — PANTOPRAZOLE SODIUM 40 MILLIGRAM(S): 20 TABLET, DELAYED RELEASE ORAL at 07:02

## 2021-02-24 RX ADMIN — CITALOPRAM 20 MILLIGRAM(S): 10 TABLET, FILM COATED ORAL at 13:39

## 2021-02-24 RX ADMIN — Medication 500000 UNIT(S): at 07:02

## 2021-02-24 RX ADMIN — PREGABALIN 1000 MICROGRAM(S): 225 CAPSULE ORAL at 13:40

## 2021-02-24 RX ADMIN — Medication 500 MILLIGRAM(S): at 13:39

## 2021-02-24 RX ADMIN — Medication 81 MILLIGRAM(S): at 13:39

## 2021-02-24 RX ADMIN — DIPHENHYDRAMINE HYDROCHLORIDE AND LIDOCAINE HYDROCHLORIDE AND ALUMINUM HYDROXIDE AND MAGNESIUM HYDRO 5 MILLILITER(S): KIT at 07:01

## 2021-02-24 RX ADMIN — Medication 975 MILLIGRAM(S): at 07:01

## 2021-02-24 RX ADMIN — RANOLAZINE 500 MILLIGRAM(S): 500 TABLET, FILM COATED, EXTENDED RELEASE ORAL at 13:40

## 2021-02-24 RX ADMIN — Medication 975 MILLIGRAM(S): at 13:40

## 2021-02-24 RX ADMIN — CLOPIDOGREL BISULFATE 75 MILLIGRAM(S): 75 TABLET, FILM COATED ORAL at 13:40

## 2021-02-24 RX ADMIN — SODIUM CHLORIDE 1 GRAM(S): 9 INJECTION INTRAMUSCULAR; INTRAVENOUS; SUBCUTANEOUS at 10:21

## 2021-02-24 RX ADMIN — ISOSORBIDE MONONITRATE 30 MILLIGRAM(S): 60 TABLET, EXTENDED RELEASE ORAL at 13:44

## 2021-02-24 RX ADMIN — Medication 100 MILLIGRAM(S): at 07:02

## 2021-02-24 RX ADMIN — LIDOCAINE 2 PATCH: 4 CREAM TOPICAL at 00:56

## 2021-02-24 NOTE — PROGRESS NOTE ADULT - PROBLEM SELECTOR PLAN 9
Notable leukocytosis on labs, slightly increased from 2018 labs  - likely related to fracture
per history, notable leukocytosis on labs, slightly increased from 2018 labs, likely related to fracture  UA neg for UTI
Notable leukocytosis on labs, slightly increased from 2018 labs  - likely related to fracture
Notable leukocytosis on labs, slightly increased from 2018 labs  - likely related to fracture
- ASA per ortho  - patient legally blind: fall precautions, assist with meals
Notable leukocytosis on labs, slightly increased from 2018 labs  - likely related to fracture
Notable leukocytosis on labs, slightly increased from 2018 labs  - likely related to fracture
per history, notable leukocytosis on labs, slightly increased from 2018 labs, likely related to fracture  UA neg for UTI
per history, notable leukocytosis on labs, slightly increased from 2018 labs, likely related to fracture  UA neg for UTI
- ASA per ortho  - patient legally blind: fall precautions, assist with meals
- ASA per ortho  - patient legally blind: fall precautions, assist with meals
Notable leukocytosis on labs, slightly increased from 2018 labs  - likely related to fracture

## 2021-02-24 NOTE — CHART NOTE - NSCHARTNOTEFT_GEN_A_CORE
Nutrition consulted for "Nutrition Support Team." Pt is A&Ox2. Per RN, pt is drinking Ensure Enlive supplements. Pt's oral thrush is preventing her from eating well at meals. Denies any GI issues (nausea/vomiting/diarrhea/constipation.)     Ensure and Magic Cup intake was encouraged. Pt was being d/c'ed at time of RD visit.    RD remains [place] [id number]

## 2021-02-24 NOTE — PROGRESS NOTE ADULT - PROBLEM SELECTOR PROBLEM 2
Hyponatremia
Hyponatremia
Legally blind
Hyponatremia
Hyponatremia
Legally blind
Hyponatremia
Legally blind
Hyponatremia
Hyponatremia

## 2021-02-24 NOTE — PROGRESS NOTE ADULT - SUBJECTIVE AND OBJECTIVE BOX
Overnight events noted      VITAL:  T(C): , Max: 36.9 (02-23-21 @ 14:10)  T(F): , Max: 98.4 (02-23-21 @ 14:10)  HR: 64 (02-24-21 @ 06:22)  BP: 131/49 (02-24-21 @ 06:22)  BP(mean): --  RR: 18 (02-24-21 @ 06:22)  SpO2: 95% (02-24-21 @ 06:22)      PHYSICAL EXAM:  Constitutional: NAD  HEENT: EOMI  Neck:  No JVD, supple   Respiratory: CTA B/L  Cardiovascular: S1 and S2, RRR  Gastrointestinal: + BS, soft, NT, ND  Extremities: No peripheral edema, + peripheral pulses  Neurological: A/O x 3, CN2-12 intact  Psychiatric: Normal mood, normal affect  : No Velasquez  Skin: No rashes, C/D/I        LABS:    Na(131)/K(4.8)/Cl(97)/HCO3(--)/BUN(--)/Cr(--)Glu(--)/Ca(--)/Mg(--)/PO4(--)    02-24 @ 07:26  Na(125)/K(4.8)/Cl(95)/HCO3(23)/BUN(16)/Cr(0.44)Glu(98)/Ca(8.0)/Mg(--)/PO4(--)    02-23 @ 07:24  Na(128)/K(4.3)/Cl(95)/HCO3(25)/BUN(21)/Cr(0.51)Glu(119)/Ca(7.9)/Mg(--)/PO4(--)    02-22 @ 07:36  Osmolality, Random Urine: 735 mosm/kg (02-23 @ 10:38)  Sodium, Random Urine: 193 mmol/L (02-23 @ 10:38)      IMPRESSION: 89F w/ HTN, CAD, CHB-PPM, and CLL, 2/12/21 a/w L hip fracture; s/p surgical repair 2/13/21; now followed by hyponatremia    (1)Hyponatremia - urine studies consistent with SIADH - improved as of today, s/p Tolvaptan  (2)Ortho - s/p hip fracture repair 2/13/21      RECOMMEND:  (1)Add NaCl 1gm po bid   (2)Free water restriction as ordered  (3)BMP daily while admitted; if for discharge, would repeat BMP 3-5 days after discharge; no renal objection to discontinuing salt tabs as outpatient if Na 130 or higher on outpatient bloodwork        Josh Chakraborty MD  [facility]  Office: [phone]  Cell: [phone]           Complains of whole-body pain   VITAL: T(C): , Max: 36.9 (02-23-21 @ 14:10) T(F): , Max: 98.4 (02-23-21 @ 14:10) HR: 64 (02-24-21 @ 06:22) BP: 131/49 (02-24-21 @ 06:22) BP(mean): -- RR: 18 (02-24-21 @ 06:22) SpO2: 95% (02-24-21 @ 06:22)   PHYSICAL EXAM: Constitutional: lethargic/depressed. Eyes closed throughout conversation HEENT: NCAT, DMM Neck:  No JVD, supple  Respiratory: CTA B/L Cardiovascular: RRR s1s2 Gastrointestinal: + BS, soft, NT, ND Extremities: No peripheral edema, + peripheral pulses Neurological: A/O x 3, CN2-12 intact : No Velasquez Skin: No rashes, C/D/I    LABS:  Na(131)/K(4.8)/Cl(97)/HCO3(--)/BUN(--)/Cr(--)Glu(--)/Ca(--)/Mg(--)/PO4(--)    02-24 @ 07:26 Na(125)/K(4.8)/Cl(95)/HCO3(23)/BUN(16)/Cr(0.44)Glu(98)/Ca(8.0)/Mg(--)/PO4(--)    02-23 @ 07:24 Na(128)/K(4.3)/Cl(95)/HCO3(25)/BUN(21)/Cr(0.51)Glu(119)/Ca(7.9)/Mg(--)/PO4(--)    02-22 @ 07:36 Osmolality, Random Urine: 735 mosm/kg (02-23 @ 10:38) Sodium, Random Urine: 193 mmol/L (02-23 @ 10:38)   IMPRESSION: 89F w/ HTN, CAD, CHB-PPM, and CLL, 2/12/21 a/w L hip fracture; s/p surgical repair 2/13/21; now followed by hyponatremia  (1)Hyponatremia - urine studies consistent with SIADH - due to pain? Improved as of today, s/p Tolvaptan (2)Ortho - s/p hip fracture repair 2/13/21   RECOMMEND: (1)Add NaCl 1gm po bid  (2)Free water restriction as ordered - counseled (3)Pain control per Medicine/Ortho - no objection to NSAIDs (4)BMP daily while admitted; if for discharge, would repeat BMP 3-5 days after discharge; no renal objection to discontinuing salt tabs as outpatient if Na 130 or higher on outpatient bloodwork    Josh Chakraborty MD [facility] Office: [phone] Cell: [phone]

## 2021-02-24 NOTE — PROGRESS NOTE ADULT - SUBJECTIVE AND OBJECTIVE BOX
[facility]  Jessica Wharton  Pager (M-F, 8A-5P): [phone]  Other Times:  [phone]    Patient is a 89y old  Female who presents with a chief complaint of left intertrochanteric fracture (19 Feb 2021 06:59)    SUBJECTIVE / OVERNIGHT EVENTS:     MEDICATIONS  (STANDING):  acetaminophen   Tablet .. 975 milliGRAM(s) Oral every 8 hours  ascorbic acid 500 milliGRAM(s) Oral daily  aspirin enteric coated 81 milliGRAM(s) Oral daily  calcium carbonate 1250 mG  + Vitamin D (OsCal 500 + D) 1 Tablet(s) Oral daily  citalopram 20 milliGRAM(s) Oral daily  clopidogrel Tablet 75 milliGRAM(s) Oral daily  cyanocobalamin 1000 MICROGram(s) Oral daily  isosorbide   mononitrate ER Tablet (IMDUR) 30 milliGRAM(s) Oral daily  melatonin 3 milliGRAM(s) Oral at bedtime  metoprolol succinate  milliGRAM(s) Oral daily  pantoprazole    Tablet 40 milliGRAM(s) Oral before breakfast  ranolazine 500 milliGRAM(s) Oral daily  senna 2 Tablet(s) Oral at bedtime  simvastatin 20 milliGRAM(s) Oral at bedtime    MEDICATIONS  (PRN):  bisacodyl Suppository 10 milliGRAM(s) Rectal daily PRN If no bowel movement by POD#2  fentaNYL    Injectable 25 MICROGram(s) IV Push every 15 minutes PRN Severe Pain (7 - 10)  magnesium hydroxide Suspension 30 milliLiter(s) Oral daily PRN Constipation  ondansetron Injectable 4 milliGRAM(s) IV Push once PRN Nausea and/or Vomiting  traMADol 25 milliGRAM(s) Oral every 4 hours PRN Moderate Pain (4 - 6)  traMADol 50 milliGRAM(s) Oral every 4 hours PRN Severe Pain (7 - 10)    Vital Signs Last 24 Hrs  T(C): 36.7 (24 Feb 2021 06:22), Max: 36.9 (23 Feb 2021 14:10)  T(F): 98 (24 Feb 2021 06:22), Max: 98.4 (23 Feb 2021 14:10)  HR: 64 (24 Feb 2021 06:22) (63 - 69)  BP: 131/49 (24 Feb 2021 06:22) (120/51 - 141/53)  BP(mean): --  RR: 18 (24 Feb 2021 06:22) (16 - 18)  SpO2: 95% (24 Feb 2021 06:22) (94% - 98%)    I&O's Summary    23 Feb 2021 07:01  -  24 Feb 2021 07:00  --------------------------------------------------------  IN: 0 mL / OUT: 2020 mL / NET: -2020 mL    24 Feb 2021 07:01  -  24 Feb 2021 09:49  --------------------------------------------------------  IN: 0 mL / OUT: 200 mL / NET: -200 mL          PHYSICAL EXAM:  GENERAL: NAD, thin  HEAD:  Atraumatic, Normocephalic  EYES: EOMI, PERRLA, conjunctiva and sclera clear  NECK: Supple, No JVD  CHEST/LUNG: Clear to auscultation bilaterally; No wheeze  HEART: Regular rate and rhythm; No murmurs, rubs, or gallops  ABDOMEN: Soft, Nontender, Nondistended; Bowel sounds present  BACK: Non tender, ROM intact.  EXTREMITIES:  2+ Peripheral Pulses, No clubbing, cyanosis. LLE movement limited due to pain.  PSYCH: Cycling between calm and combativeness.  NEUROLOGY: AAOx1 - name  SKIN: Surgical dressing C/D/I    LABS:    02-24    131<L>  |  97<L>  |  16  ----------------------------<  105<H>  4.8   |  28  |  0.58    Ca    8.5      24 Feb 2021 07:26    TPro  x   /  Alb  3.1<L>  /  TBili  x   /  DBili  x   /  AST  x   /  ALT  x   /  AlkPhos  x   02-24          RADIOLOGY & ADDITIONAL TESTS:    Imaging Personally Reviewed:    Care Discussed with Consultants/Other Providers: Ortho, renal    Care Discussed with Orthopedic PA about:    [facility]  Jessica Wharton  Pager (M-F, 8A-5P): [phone]  Other Times:  [phone]    Patient is a 89y old  Female who presents with a chief complaint of left intertrochanteric fracture (19 Feb 2021 06:59)    SUBJECTIVE / OVERNIGHT EVENTS: Seen and examined. Still complains of pain all over and throat pain    MEDICATIONS  (STANDING):  acetaminophen   Tablet .. 975 milliGRAM(s) Oral every 8 hours  ascorbic acid 500 milliGRAM(s) Oral daily  aspirin enteric coated 81 milliGRAM(s) Oral daily  calcium carbonate 1250 mG  + Vitamin D (OsCal 500 + D) 1 Tablet(s) Oral daily  citalopram 20 milliGRAM(s) Oral daily  clopidogrel Tablet 75 milliGRAM(s) Oral daily  cyanocobalamin 1000 MICROGram(s) Oral daily  isosorbide   mononitrate ER Tablet (IMDUR) 30 milliGRAM(s) Oral daily  melatonin 3 milliGRAM(s) Oral at bedtime  metoprolol succinate  milliGRAM(s) Oral daily  pantoprazole    Tablet 40 milliGRAM(s) Oral before breakfast  ranolazine 500 milliGRAM(s) Oral daily  senna 2 Tablet(s) Oral at bedtime  simvastatin 20 milliGRAM(s) Oral at bedtime    MEDICATIONS  (PRN):  bisacodyl Suppository 10 milliGRAM(s) Rectal daily PRN If no bowel movement by POD#2  fentaNYL    Injectable 25 MICROGram(s) IV Push every 15 minutes PRN Severe Pain (7 - 10)  magnesium hydroxide Suspension 30 milliLiter(s) Oral daily PRN Constipation  ondansetron Injectable 4 milliGRAM(s) IV Push once PRN Nausea and/or Vomiting  traMADol 25 milliGRAM(s) Oral every 4 hours PRN Moderate Pain (4 - 6)  traMADol 50 milliGRAM(s) Oral every 4 hours PRN Severe Pain (7 - 10)    Vital Signs Last 24 Hrs  T(C): 36.7 (24 Feb 2021 06:22), Max: 36.9 (23 Feb 2021 14:10)  T(F): 98 (24 Feb 2021 06:22), Max: 98.4 (23 Feb 2021 14:10)  HR: 64 (24 Feb 2021 06:22) (63 - 69)  BP: 131/49 (24 Feb 2021 06:22) (120/51 - 141/53)  BP(mean): --  RR: 18 (24 Feb 2021 06:22) (16 - 18)  SpO2: 95% (24 Feb 2021 06:22) (94% - 98%)    I&O's Summary    23 Feb 2021 07:01  -  24 Feb 2021 07:00  --------------------------------------------------------  IN: 0 mL / OUT: 2020 mL / NET: -2020 mL    24 Feb 2021 07:01  -  24 Feb 2021 09:49  --------------------------------------------------------  IN: 0 mL / OUT: 200 mL / NET: -200 mL          PHYSICAL EXAM:  GENERAL: NAD, thin  HEAD:  Atraumatic, Normocephalic  EYES: EOMI, PERRLA, conjunctiva and sclera clear  NECK: Supple, No JVD  CHEST/LUNG: Clear to auscultation bilaterally; No wheeze  HEART: Regular rate and rhythm; No murmurs, rubs, or gallops  ABDOMEN: Soft, Nontender, Nondistended; Bowel sounds present  BACK: Non tender, ROM intact.  EXTREMITIES:  2+ Peripheral Pulses, No clubbing, cyanosis. LLE movement limited due to pain.  PSYCH: Cycling between calm and combativeness.  NEUROLOGY: AAOx1 - name  SKIN: Surgical dressing C/D/I    LABS:    02-24    131<L>  |  97<L>  |  16  ----------------------------<  105<H>  4.8   |  28  |  0.58    Ca    8.5      24 Feb 2021 07:26    TPro  x   /  Alb  3.1<L>  /  TBili  x   /  DBili  x   /  AST  x   /  ALT  x   /  AlkPhos  x   02-24          RADIOLOGY & ADDITIONAL TESTS:    Imaging Personally Reviewed:    Care Discussed with Consultants/Other Providers: Ortho, renal    Care Discussed with Orthopedic PA about:

## 2021-02-24 NOTE — PROGRESS NOTE ADULT - PROVIDER SPECIALTY LIST ADULT
Hospitalist
Orthopedics
ENT
Nephrology
Orthopedics
Orthopedics
Hospitalist

## 2021-02-24 NOTE — PROGRESS NOTE ADULT - PROBLEM SELECTOR PROBLEM 3
Coronary Artery Disease

## 2021-02-24 NOTE — PROGRESS NOTE ADULT - PROBLEM SELECTOR PROBLEM 8
History of pulmonary embolism
Abnormal finding on imaging
History of pulmonary embolism
Abnormal finding on imaging
CLL (chronic lymphocytic leukemia)
History of pulmonary embolism
CLL (chronic lymphocytic leukemia)
Abnormal finding on imaging
CLL (chronic lymphocytic leukemia)
History of pulmonary embolism

## 2021-02-24 NOTE — PROGRESS NOTE ADULT - ASSESSMENT
89 year old female with oral candidiasis. 
89F, legally blind with hx of CAD s/p PCI, CHB s/p PPM, carotid stenosis s/p CEA, HTN, HLD, CLL, presenting with L hip pain s/p unwitnessed mechanical fall, found to have acute L intertrochanteric fracture s/p OR 2/13. c/b hyponatremia.
89F, legally blind with hx of CAD s/p PCI, CHB s/p PPM, carotid stenosis s/p CEA, HTN, HLD, CLL, presenting with L hip pain s/p unwitnessed mechanical fall, found to have acute L intertrochanteric fracture s/p OR 2/13. c/b hyponatremia.
89F, legally blind with hx of CAD s/p PCI, CHB s/p PPM, carotid stenosis s/p CEA, HTN, HLD, CLL, presenting with L hip pain s/p unwitnessed mechanical fall, found to have acute L intertrochanteric fracture s/p OR 2/13.
89-year-old female, legally blind with CAD s/p stent placement, CHB S/P PPM, carotid stenosis s/p CEA, HTN, HLD, CLL, presenting with L hip pain s/p unwitnessed mechanical fall, found to have acute L intertrochanteric fracture, for OR 2/13
89F, legally blind with hx of CAD s/p PCI, CHB s/p PPM, carotid stenosis s/p CEA, HTN, HLD, CLL, presenting with L hip pain s/p unwitnessed mechanical fall, found to have acute L intertrochanteric fracture s/p OR 2/13. c/b hyponatremia.

## 2021-02-24 NOTE — PROGRESS NOTE ADULT - REASON FOR ADMISSION
left intertrochanteric fracture

## 2021-02-24 NOTE — PROGRESS NOTE ADULT - PROBLEM SELECTOR PROBLEM 9
CLL (chronic lymphocytic leukemia)
Need for prophylactic measure
Need for prophylactic measure
CLL (chronic lymphocytic leukemia)
Need for prophylactic measure
CLL (chronic lymphocytic leukemia)
CLL (chronic lymphocytic leukemia)

## 2021-02-24 NOTE — PROGRESS NOTE ADULT - PROBLEM SELECTOR PROBLEM 1
Closed nondisplaced intertrochanteric fracture of left femur, initial encounter
Oral candidiasis
Closed nondisplaced intertrochanteric fracture of left femur, initial encounter

## 2021-02-24 NOTE — PROGRESS NOTE ADULT - PROBLEM SELECTOR PLAN 2
Due to SIADH  - 1L/day fluid restriction  - monitor BMP
Due to SIADH  -Na low again to 125 todayu  -Renal consult called for assistance in managing hyponatremia  - 1L/day fluid restriction  - monitor BMP  -Repeat urine studies sent
continues to decrease - but no significant change to mental status from baseline.  - awaiting Urine osm/electrolytes - r/o SIADH  - hold off IVF given b/l pleural effusions to avoid fluid overload  - monitor BMP
Due to SIADH  -s/p tolvaptan on 2/23  -Renal consult appreciated for assistance in managing hyponatremia  - 1L/day fluid restriction  - monitor BMP  -c/w salt tabs 1g BID - can dc as outpt if Na>130 on outpt labs
fall precautions  reorient as needed.
Due to SIADH  -na improved t0 128, c/w sodium chloride 1tab BID  - 1L/day fluid restriction  - monitor BMP
Na 129, dry on exam  - encourage PO intake, assist with meals, d/w ortho team  - hold off IVF given b/l pleural effusions to avoid fluid overload  - monitor BMP
Due to SIADH  - 1L/day fluid restriction  - monitor BMP  - if no improvement by tomorrow, consider renal consult
fall precautions  reorient as needed.
fall precautions  reorient as needed  please have nursing staff assist patient with feeding
Due to SIADH  -Na still low, start sodium chloride 1tab BID  - 1L/day fluid restriction  - monitor BMP  - if no improvement by monday, consider renal eval
Due to SIADH  -Na still low, start sodium chloride 1tab BID  - 1L/day fluid restriction  - monitor BMP  - if no improvement by monday, consider renal eval

## 2021-02-24 NOTE — PROGRESS NOTE ADULT - PROBLEM SELECTOR PLAN 7
PPM interrogated, underlying rhythm complete heart blocker, pacer dependent, see note/cards recs.
BP stable  - was soft previously, norvasc discontinued, imdur reduced to 30 mg qd  - caution with fluid given pleural effusion
BP stable  - was soft previously, norvasc discontinued, imdur reduced to 30 mg qd  - caution with fluid given pleural effusion
per PMH, patient not aware of this Dx  - DVT ppx w/ ASA per ortho
BP stable  - was soft previously, norvasc discontinued, imdur reduced to 30 mg qd  - caution with fluid given pleural effusion
BP stable  - was soft previously, norvasc discontinued, imdur reduced to 30 mg qd  - caution with fluid given pleural effusion
per PMH, patient not aware of this Dx  - DVT ppx w/ ASA per ortho
BP stable  - was soft previously, norvasc discontinued, imdur reduced to 30 mg qd  - caution with fluid given pleural effusion
per PMH, patient not aware of this Dx  - DVT ppx w/ ASA per ortho
BP stable  - was soft previously, norvasc discontinued, imdur reduced to 30 mg qd  - caution with fluid given pleural effusion

## 2021-02-24 NOTE — PROGRESS NOTE ADULT - PROBLEM SELECTOR PROBLEM 10
Need for prophylactic measure
Oral candidiasis
Need for prophylactic measure
Oral candidiasis
Need for prophylactic measure
Oral candidiasis
Need for prophylactic measure

## 2021-02-24 NOTE — PROGRESS NOTE ADULT - PROBLEM SELECTOR PROBLEM 7
Essential hypertension
Complete heart block
Essential hypertension
Essential hypertension
History of pulmonary embolism
Essential hypertension
Complete heart block
Essential hypertension
History of pulmonary embolism
History of pulmonary embolism
Complete heart block
Essential hypertension

## 2021-02-24 NOTE — PROGRESS NOTE ADULT - SUBJECTIVE AND OBJECTIVE BOX
Ortho Progress Note  EN ADHIKARI   [id number]    89yFemale is s/p L hip IMN POD#11 w/out any c/o.  Resting comfortably, NAD    Vital Signs Last 24 Hrs  T(C): 36.8 (23 Feb 2021 21:07), Max: 36.9 (23 Feb 2021 14:10)  T(F): 98.2 (23 Feb 2021 21:07), Max: 98.4 (23 Feb 2021 14:10)  HR: 63 (23 Feb 2021 21:07) (63 - 69)  BP: 131/50 (23 Feb 2021 21:07) (120/51 - 142/56)  BP(mean): --  RR: 17 (23 Feb 2021 21:07) (16 - 17)  SpO2: 94% (23 Feb 2021 21:07) (94% - 98%)  morphine (Other)  oxycodone (Unknown)  Percocet 5/325 (Unknown)  pilocarpine (Other)      S/P L hip IMN  L hip/thigh wound dressings gauze/tega's are C/D/I  motor L EHL/TA/GS intact 5/5  sensation LLE intact to light touch                            9.7    20.74 )-----------( 292      ( 22 Feb 2021 07:33 )             29.6     02-23    125<L>  |  95<L>  |  16  ----------------------------<  98  4.8   |  23  |  0.44<L>    Ca    8.0<L>      23 Feb 2021 07:24    TPro  x   /  Alb  2.9<L>  /  TBili  x   /  DBili  x   /  AST  x   /  ALT  x   /  AlkPhos  x   02-23        A/P Ortho Stable s/p L hip IMN POD#11  ck labs - Hyponatremia given samsca per Renal, agree to encourage protein intake  follow up Nutrition consult for recs on increase pt's dietary intake  continue Aspirin/Plavix for DVT ppx  continue Physical Therapy BID: WBAT, OOB for gait training  continue PO nystatin per ENT for oral candida - 14day course ends 3/8/2021  discharge planning to rehab once patient is Renal/OHOS cleared for discharge

## 2021-03-15 NOTE — CONSULT NOTE ADULT - ASSESSMENT
Surgical Office Location :   [facility]  [address] [place] [address]     89 y/o Female A&O x3, legally blind with PMH of CAD S/P stent placement, CHB S/P PPM placement, carotid stenosis s/p carotid endarterectomy, HTN, HLD, CLL, s/p right hip repair, here with mechanical fall and left hip fracture. Cardiology consulted for preop clearance for OR.     PreOP Clearance   RCRI 1(iSCHEMIC heart disease)  Pt without evidence of decompensated heart failure, or unstable anginal symptoms   Pt blind w 24 HRS aid, METS likely less than 4   There is no ECG in the chart for review. Please document ECG and follow up with cardiology   Pt has PPM for Complete heart block, pt rhythm should be continuously supported during the procedure   The indication for surgery is acute hip fracture which warrants urgent attention. At this time there is no additional cardiology testing that should preclude the urgent procedure  Antiplatelet and anticoagulation per surgical team   Please call cardiology with any issues     José Manuel Daily MD  Cardiology Fellow  [phone]    Please check [email or web address]: [email or web address] for cardiology service schedule and contact information.

## 2021-05-12 NOTE — ED ADULT NURSE NOTE - PAIN: BODY LOCATION
Rx sent to pharmacy per protocol   Last appt: upcoming appt 6/2/2021  Last refill: 2/15/2021    
chest, retrosternal

## 2024-09-18 NOTE — PROCEDURE NOTE - NSINTLVENTLD_CARD_ALL_CORE
No Length Of Therapy: 1 month Add High Risk Medication Management Associated Diagnosis?: Yes Detail Level: Zone